# Patient Record
Sex: MALE | Race: WHITE | Employment: OTHER | URBAN - NONMETROPOLITAN AREA
[De-identification: names, ages, dates, MRNs, and addresses within clinical notes are randomized per-mention and may not be internally consistent; named-entity substitution may affect disease eponyms.]

---

## 2017-01-03 ENCOUNTER — OFFICE VISIT (OUTPATIENT)
Dept: FAMILY MEDICINE CLINIC | Age: 57
End: 2017-01-03

## 2017-01-03 ENCOUNTER — TELEPHONE (OUTPATIENT)
Dept: FAMILY MEDICINE CLINIC | Age: 57
End: 2017-01-03

## 2017-01-03 VITALS
OXYGEN SATURATION: 94 % | DIASTOLIC BLOOD PRESSURE: 86 MMHG | TEMPERATURE: 98.1 F | WEIGHT: 166 LBS | SYSTOLIC BLOOD PRESSURE: 118 MMHG | HEART RATE: 86 BPM | BODY MASS INDEX: 27.2 KG/M2

## 2017-01-03 DIAGNOSIS — R06.02 SHORTNESS OF BREATH: ICD-10-CM

## 2017-01-03 DIAGNOSIS — R05.9 COUGH: Primary | ICD-10-CM

## 2017-01-03 PROCEDURE — 99213 OFFICE O/P EST LOW 20 MIN: CPT | Performed by: FAMILY MEDICINE

## 2017-01-03 RX ORDER — DOXYCYCLINE HYCLATE 100 MG
100 TABLET ORAL 2 TIMES DAILY
Qty: 20 TABLET | Refills: 0 | Status: SHIPPED | OUTPATIENT
Start: 2017-01-03 | End: 2017-01-13

## 2017-01-03 RX ORDER — PROMETHAZINE HYDROCHLORIDE AND CODEINE PHOSPHATE 6.25; 1 MG/5ML; MG/5ML
SYRUP ORAL
Qty: 120 ML | Refills: 0 | Status: SHIPPED | OUTPATIENT
Start: 2017-01-03 | End: 2017-11-13

## 2017-01-03 ASSESSMENT — ENCOUNTER SYMPTOMS
SHORTNESS OF BREATH: 1
VOMITING: 0
SORE THROAT: 1
WHEEZING: 1
NAUSEA: 1
HEMOPTYSIS: 0
RHINORRHEA: 1
COUGH: 1

## 2017-07-03 RX ORDER — MELOXICAM 15 MG/1
TABLET ORAL
Qty: 90 TABLET | Refills: 1 | Status: SHIPPED | OUTPATIENT
Start: 2017-07-03 | End: 2018-03-18 | Stop reason: SDUPTHER

## 2017-11-13 ENCOUNTER — OFFICE VISIT (OUTPATIENT)
Dept: FAMILY MEDICINE CLINIC | Age: 57
End: 2017-11-13

## 2017-11-13 VITALS
HEIGHT: 67 IN | SYSTOLIC BLOOD PRESSURE: 148 MMHG | DIASTOLIC BLOOD PRESSURE: 100 MMHG | TEMPERATURE: 99.2 F | BODY MASS INDEX: 24.17 KG/M2 | HEART RATE: 104 BPM | OXYGEN SATURATION: 95 % | WEIGHT: 154 LBS

## 2017-11-13 DIAGNOSIS — R05.9 COUGH: ICD-10-CM

## 2017-11-13 DIAGNOSIS — J01.00 ACUTE MAXILLARY SINUSITIS, RECURRENCE NOT SPECIFIED: Primary | ICD-10-CM

## 2017-11-13 PROCEDURE — 99213 OFFICE O/P EST LOW 20 MIN: CPT | Performed by: FAMILY MEDICINE

## 2017-11-13 RX ORDER — PROMETHAZINE HYDROCHLORIDE AND CODEINE PHOSPHATE 6.25; 1 MG/5ML; MG/5ML
SYRUP ORAL
Qty: 120 ML | Refills: 0 | Status: SHIPPED | OUTPATIENT
Start: 2017-11-13 | End: 2018-10-02 | Stop reason: ALTCHOICE

## 2017-11-13 RX ORDER — DOXYCYCLINE HYCLATE 100 MG
100 TABLET ORAL 2 TIMES DAILY
Qty: 20 TABLET | Refills: 0 | Status: SHIPPED | OUTPATIENT
Start: 2017-11-13 | End: 2017-11-23

## 2017-11-13 ASSESSMENT — ENCOUNTER SYMPTOMS
SHORTNESS OF BREATH: 1
SORE THROAT: 1
SINUS PRESSURE: 1
COUGH: 1

## 2017-11-13 NOTE — LETTER
[address] [facility] [address]  Phone: [phone]  Fax: [phone]    Aminata Jacinto MD        November 13, 2017     Patient: Griselda Guy   YOB: 1960   Date of Visit: 11/13/2017       To Whom It May Concern: It is my medical opinion that Farhana Turner should remain out of work until 11/15/17. If you have any questions or concerns, please don't hesitate to call.     Sincerely,    Aminata Jacinto MD

## 2017-11-13 NOTE — PROGRESS NOTES
[address] [place] [address] Drive, [facility], [place]  Phone:  [phone]  Fax:  [phone]       Name: Ange Bingham  : 1960    Chief Complaint   Patient presents with    Chest Congestion     SOB, right ear pain, fevers, all started 2 weeks ago    Congestion    Headache    Head Congestion    Nasal Congestion       HPI:     Ange Bingham is a 59 y.o. male who presents today for evaluation of chest congestion, cough, SOB, frontal headache, and nasal congestion for the past 2 weeks. He continues to smoke but over the past 2 weeks has cut down to only 2 packs because of the increased coughing. Sinusitis   This is a new problem. Episode onset: 2 weeks ago. The problem has been gradually worsening since onset. The maximum temperature recorded prior to his arrival was 101 - 101.9 F. Associated symptoms include chills, congestion, coughing, ear pain, headaches, shortness of breath, sinus pressure and a sore throat. Past treatments include nothing. Current Outpatient Prescriptions:     doxycycline hyclate (VIBRA-TABS) 100 MG tablet, Take 1 tablet by mouth 2 times daily for 10 days, Disp: 20 tablet, Rfl: 0    promethazine-codeine (PHENERGAN WITH CODEINE) 6.25-10 MG/5ML syrup, 1-2 tsp Q 6 Hours PRN cough. , Disp: 120 mL, Rfl: 0    meloxicam (MOBIC) 15 MG tablet, take 1 tablet by mouth once daily, Disp: 90 tablet, Rfl: 1    ibuprofen (ADVIL;MOTRIN) 200 MG tablet, Take 200 mg by mouth every 6 hours as needed for Pain, Disp: , Rfl:     Allergies   Allergen Reactions    Sulfa Antibiotics Hives       Subjective:      Review of Systems   Constitutional: Positive for chills and fever. HENT: Positive for congestion, ear pain, sinus pressure and sore throat. Respiratory: Positive for cough and shortness of breath. Neurological: Positive for headaches.        Objective:     BP (!) 148/100 (Site: Left Arm, Position: Sitting, Cuff Size: Large Adult)   Pulse 104   Temp

## 2018-04-12 ENCOUNTER — HOSPITAL ENCOUNTER (EMERGENCY)
Age: 58
Discharge: HOME | End: 2018-04-12
Attending: FAMILY MEDICINE
Payer: COMMERCIAL

## 2018-04-12 ENCOUNTER — APPOINTMENT (OUTPATIENT)
Dept: GENERAL RADIOLOGY | Age: 58
End: 2018-04-12
Payer: COMMERCIAL

## 2018-04-12 VITALS
RESPIRATION RATE: 18 BRPM | WEIGHT: 154 LBS | HEART RATE: 74 BPM | OXYGEN SATURATION: 98 % | TEMPERATURE: 97.8 F | SYSTOLIC BLOOD PRESSURE: 150 MMHG | HEIGHT: 67 IN | BODY MASS INDEX: 24.17 KG/M2 | DIASTOLIC BLOOD PRESSURE: 98 MMHG

## 2018-04-12 DIAGNOSIS — I10 ESSENTIAL HYPERTENSION: Primary | ICD-10-CM

## 2018-04-12 LAB
ALBUMIN SERPL-MCNC: 4.3 G/DL (ref 3.5–5.1)
ALP BLD-CCNC: 66 U/L (ref 38–126)
ALT SERPL-CCNC: 27 U/L (ref 11–66)
ANION GAP SERPL CALCULATED.3IONS-SCNC: 13 MEQ/L (ref 8–16)
ANISOCYTOSIS: ABNORMAL
APTT: 32.1 SECONDS (ref 22–38)
AST SERPL-CCNC: 20 U/L (ref 5–40)
BASOPHILS # BLD: 0.7 %
BASOPHILS ABSOLUTE: 0 THOU/MM3 (ref 0–0.1)
BILIRUB SERPL-MCNC: 0.2 MG/DL (ref 0.3–1.2)
BILIRUBIN DIRECT: < 0.2 MG/DL (ref 0–0.3)
BUN BLDV-MCNC: 24 MG/DL (ref 7–22)
CALCIUM SERPL-MCNC: 9.2 MG/DL (ref 8.5–10.5)
CHLORIDE BLD-SCNC: 102 MEQ/L (ref 98–111)
CO2: 26 MEQ/L (ref 23–33)
CREAT SERPL-MCNC: 0.8 MG/DL (ref 0.4–1.2)
EKG ATRIAL RATE: 64 BPM
EKG P AXIS: 29 DEGREES
EKG P-R INTERVAL: 196 MS
EKG Q-T INTERVAL: 394 MS
EKG QRS DURATION: 102 MS
EKG QTC CALCULATION (BAZETT): 406 MS
EKG R AXIS: 64 DEGREES
EKG T AXIS: 44 DEGREES
EKG VENTRICULAR RATE: 64 BPM
EOSINOPHIL # BLD: 1.9 %
EOSINOPHILS ABSOLUTE: 0.1 THOU/MM3 (ref 0–0.4)
GFR SERPL CREATININE-BSD FRML MDRD: > 90 ML/MIN/1.73M2
GLUCOSE BLD-MCNC: 100 MG/DL (ref 70–108)
HCT VFR BLD CALC: 46.2 % (ref 42–52)
HEMOGLOBIN: 15.5 GM/DL (ref 14–18)
INR BLD: 1.01 (ref 0.85–1.13)
LIPASE: 34 U/L (ref 5.6–51.3)
LYMPHOCYTES # BLD: 23.9 %
LYMPHOCYTES ABSOLUTE: 1.4 THOU/MM3 (ref 1–4.8)
MCH RBC QN AUTO: 29 PG (ref 27–31)
MCHC RBC AUTO-ENTMCNC: 33.6 GM/DL (ref 33–37)
MCV RBC AUTO: 86.4 FL (ref 80–94)
MONOCYTES # BLD: 9 %
MONOCYTES ABSOLUTE: 0.5 THOU/MM3 (ref 0.4–1.3)
NUCLEATED RED BLOOD CELLS: 0 /100 WBC
OSMOLALITY CALCULATION: 285.4 MOSMOL/KG (ref 275–300)
PDW BLD-RTO: 14.9 % (ref 11.5–14.5)
PLATELET # BLD: 215 THOU/MM3 (ref 130–400)
PMV BLD AUTO: 8.6 FL (ref 7.4–10.4)
POTASSIUM SERPL-SCNC: 5 MEQ/L (ref 3.5–5.2)
RBC # BLD: 5.35 MILL/MM3 (ref 4.7–6.1)
SEG NEUTROPHILS: 64.5 %
SEGMENTED NEUTROPHILS ABSOLUTE COUNT: 3.7 THOU/MM3 (ref 1.8–7.7)
SODIUM BLD-SCNC: 141 MEQ/L (ref 135–145)
TOTAL PROTEIN: 7.9 G/DL (ref 6.1–8)
TROPONIN T: < 0.01 NG/ML
WBC # BLD: 5.8 THOU/MM3 (ref 4.8–10.8)

## 2018-04-12 PROCEDURE — 99285 EMERGENCY DEPT VISIT HI MDM: CPT

## 2018-04-12 PROCEDURE — 80053 COMPREHEN METABOLIC PANEL: CPT

## 2018-04-12 PROCEDURE — 82248 BILIRUBIN DIRECT: CPT

## 2018-04-12 PROCEDURE — 85025 COMPLETE CBC W/AUTO DIFF WBC: CPT

## 2018-04-12 PROCEDURE — 71045 X-RAY EXAM CHEST 1 VIEW: CPT

## 2018-04-12 PROCEDURE — 93005 ELECTROCARDIOGRAM TRACING: CPT | Performed by: FAMILY MEDICINE

## 2018-04-12 PROCEDURE — 85730 THROMBOPLASTIN TIME PARTIAL: CPT

## 2018-04-12 PROCEDURE — 84484 ASSAY OF TROPONIN QUANT: CPT

## 2018-04-12 PROCEDURE — 36415 COLL VENOUS BLD VENIPUNCTURE: CPT

## 2018-04-12 PROCEDURE — 83690 ASSAY OF LIPASE: CPT

## 2018-04-12 PROCEDURE — 85610 PROTHROMBIN TIME: CPT

## 2018-04-12 RX ORDER — LISINOPRIL AND HYDROCHLOROTHIAZIDE 12.5; 1 MG/1; MG/1
1 TABLET ORAL DAILY
Qty: 30 TABLET | Refills: 3 | Status: SHIPPED | OUTPATIENT
Start: 2018-04-12 | End: 2018-12-17 | Stop reason: SDUPTHER

## 2018-04-12 RX ORDER — LISINOPRIL 10 MG/1
10 TABLET ORAL ONCE
Status: DISCONTINUED | OUTPATIENT
Start: 2018-04-12 | End: 2018-04-12 | Stop reason: HOSPADM

## 2018-04-12 RX ORDER — HYDROCHLOROTHIAZIDE 25 MG/1
12.5 TABLET ORAL ONCE
Status: DISCONTINUED | OUTPATIENT
Start: 2018-04-12 | End: 2018-04-12 | Stop reason: HOSPADM

## 2018-04-12 ASSESSMENT — HEART SCORE: ECG: 0

## 2018-04-12 ASSESSMENT — ENCOUNTER SYMPTOMS
EYE DISCHARGE: 0
CHEST TIGHTNESS: 1
ABDOMINAL PAIN: 0

## 2018-07-03 RX ORDER — MELOXICAM 15 MG/1
TABLET ORAL
Qty: 90 TABLET | Refills: 0 | Status: SHIPPED | OUTPATIENT
Start: 2018-07-03 | End: 2018-10-02 | Stop reason: SDUPTHER

## 2018-10-02 ENCOUNTER — HOSPITAL ENCOUNTER (EMERGENCY)
Age: 58
Discharge: HOME | End: 2018-10-02
Payer: COMMERCIAL

## 2018-10-02 VITALS
DIASTOLIC BLOOD PRESSURE: 79 MMHG | HEIGHT: 65 IN | TEMPERATURE: 97.6 F | BODY MASS INDEX: 25.83 KG/M2 | HEART RATE: 66 BPM | OXYGEN SATURATION: 97 % | WEIGHT: 155 LBS | RESPIRATION RATE: 14 BRPM | SYSTOLIC BLOOD PRESSURE: 124 MMHG

## 2018-10-02 DIAGNOSIS — G56.03 BILATERAL CARPAL TUNNEL SYNDROME: Primary | ICD-10-CM

## 2018-10-02 PROCEDURE — 99213 OFFICE O/P EST LOW 20 MIN: CPT | Performed by: NURSE PRACTITIONER

## 2018-10-02 PROCEDURE — L3809 WHFO W/O JOINTS PRE OTS: HCPCS

## 2018-10-02 PROCEDURE — 99212 OFFICE O/P EST SF 10 MIN: CPT

## 2018-10-02 RX ORDER — PREDNISONE 10 MG/1
TABLET ORAL
Qty: 21 TABLET | Refills: 0 | Status: SHIPPED | OUTPATIENT
Start: 2018-10-02 | End: 2019-01-04 | Stop reason: ALTCHOICE

## 2018-10-02 ASSESSMENT — PAIN DESCRIPTION - DESCRIPTORS: DESCRIPTORS: SHARP

## 2018-10-02 ASSESSMENT — ENCOUNTER SYMPTOMS
ABDOMINAL PAIN: 0
BACK PAIN: 0
SHORTNESS OF BREATH: 0
RESPIRATORY NEGATIVE: 1

## 2018-10-02 ASSESSMENT — PAIN DESCRIPTION - PAIN TYPE: TYPE: ACUTE PAIN

## 2018-10-02 ASSESSMENT — PAIN DESCRIPTION - ORIENTATION: ORIENTATION: RIGHT;LEFT

## 2018-10-02 ASSESSMENT — PAIN SCALES - GENERAL: PAINLEVEL_OUTOF10: 8

## 2018-10-02 ASSESSMENT — PAIN DESCRIPTION - LOCATION: LOCATION: WRIST

## 2018-10-02 NOTE — ED PROVIDER NOTES
[address]  Urgent Care Encounter       CHIEF COMPLAINT       Chief Complaint   Patient presents with    Wrist Pain     bilateral wrist pain worsened on sunday, however it has been painful x 1 year. Nurses Notes reviewed and I agree except as noted in the HPI. HISTORY OF PRESENT ILLNESS   Shelley Zavala is a 59 y.o. male who presents To the urgent care center complaining of bilateral wrist pain. The patient states he has had pain over the past year however the pain is progressively gotten worse since Sunday. he didn't has been wearing splints to both wrist intermittently during that timeframe. The patient does work as a  and apparently moving heavy pieces of metal repetitively. The patient states that this morning he had pain to the wrist and had a hard time making a fist.  Complained of pain more to the left hand than the right. he is left-hand dominant. The patient stated that he has been taking ibuprofen for the pain. He also complained of tingling to the fingers. The patient also complained of pain radiating up the left forearm. The history is provided by the patient. No  was used. Wrist Pain   This is a chronic problem. The current episode started more than 1 week ago (one year ago). The problem occurs constantly. The problem has been gradually worsening. Pertinent negatives include no chest pain, no abdominal pain, no headaches and no shortness of breath. The symptoms are aggravated by bending. Nothing relieves the symptoms. He has tried a cold compress and rest for the symptoms. The treatment provided no relief. REVIEW OF SYSTEMS     Review of Systems   Constitutional: Positive for activity change. Negative for appetite change and fever. Respiratory: Negative. Negative for shortness of breath. Cardiovascular: Negative for chest pain. Gastrointestinal: Negative for abdominal pain. Musculoskeletal: Positive for arthralgias. Negative for back pain, joint swelling, neck pain and neck stiffness. Skin: Negative. Neurological: Positive for weakness and numbness. Negative for tremors and headaches. Bilateral wrist       PAST MEDICAL HISTORY         Diagnosis Date    Chronic back pain     Essential hypertension 4/1/2016    GERD (gastroesophageal reflux disease)     Osteoarthritis     Stomach ulcer        SURGICAL HISTORY     Patient  has a past surgical history that includes Rotator cuff repair (2005 x2); hernia repair; hernia repair (1/3/12); Vasectomy (2005); and Tooth Extraction (1997). CURRENT MEDICATIONS       Discharge Medication List as of 10/2/2018 10:30 AM      CONTINUE these medications which have NOT CHANGED    Details   Potassium 75 MG TABS Take by mouthHistorical Med      meloxicam (MOBIC) 15 MG tablet take 1 tablet by mouth once daily, Disp-90 tablet, R-0Normal      lisinopril-hydrochlorothiazide (PRINZIDE;ZESTORETIC) 10-12.5 MG per tablet Take 1 tablet by mouth daily, Disp-30 tablet, R-3Print      ibuprofen (ADVIL;MOTRIN) 200 MG tablet Take 600 mg by mouth every 6 hours as needed for Pain Historical Med             ALLERGIES     Patient is is allergic to sulfa antibiotics. Patients   Immunization History   Administered Date(s) Administered    Influenza Virus Vaccine 03/15/2014    Pneumococcal Polysaccharide ([id number]) 03/15/2014       FAMILY HISTORY     Patient's family history includes Arthritis in his brother, father, and mother; Cancer in his brother and father; Diabetes in his father; Heart Disease in his brother, father, mother, and sister; High Blood Pressure in his brother, father, mother, and sister; Learning Disabilities in his brother and sister. SOCIAL HISTORY     Patient  reports that he has been smoking Cigarettes. He has a 40.00 pack-year smoking history. He has never used smokeless tobacco. He reports that he does not drink alcohol or use drugs.     PHYSICAL EXAM     ED TRIAGE

## 2018-10-03 RX ORDER — MELOXICAM 15 MG/1
TABLET ORAL
Qty: 90 TABLET | Refills: 0 | Status: SHIPPED | OUTPATIENT
Start: 2018-10-03 | End: 2019-01-04 | Stop reason: ALTCHOICE

## 2018-11-15 ENCOUNTER — PROCEDURE VISIT (OUTPATIENT)
Dept: NEUROLOGY | Age: 58
End: 2018-11-15
Payer: COMMERCIAL

## 2018-11-15 DIAGNOSIS — R20.0 BILATERAL HAND NUMBNESS: ICD-10-CM

## 2018-11-15 DIAGNOSIS — G56.03 BILATERAL CARPAL TUNNEL SYNDROME: ICD-10-CM

## 2018-11-15 DIAGNOSIS — M54.12 CERVICAL RADICULOPATHY: Primary | ICD-10-CM

## 2018-11-15 PROCEDURE — 95911 NRV CNDJ TEST 9-10 STUDIES: CPT | Performed by: PSYCHIATRY & NEUROLOGY

## 2018-11-15 PROCEDURE — 95886 MUSC TEST DONE W/N TEST COMP: CPT | Performed by: PSYCHIATRY & NEUROLOGY

## 2018-11-29 ENCOUNTER — HOSPITAL ENCOUNTER (OUTPATIENT)
Age: 58
Discharge: HOME | End: 2018-11-29
Payer: COMMERCIAL

## 2018-11-29 ENCOUNTER — HOSPITAL ENCOUNTER (OUTPATIENT)
Dept: GENERAL RADIOLOGY | Age: 58
Discharge: HOME | End: 2018-11-29
Payer: COMMERCIAL

## 2018-11-29 DIAGNOSIS — Z01.811 PRE-OP CHEST EXAM: ICD-10-CM

## 2018-11-29 LAB
ANION GAP SERPL CALCULATED.3IONS-SCNC: 13 MEQ/L (ref 8–16)
BASOPHILS # BLD: 0.6 %
BASOPHILS ABSOLUTE: 0 THOU/MM3 (ref 0–0.1)
BUN BLDV-MCNC: 25 MG/DL (ref 7–22)
CALCIUM SERPL-MCNC: 9.6 MG/DL (ref 8.5–10.5)
CHLORIDE BLD-SCNC: 103 MEQ/L (ref 98–111)
CO2: 25 MEQ/L (ref 23–33)
CREAT SERPL-MCNC: 1.1 MG/DL (ref 0.4–1.2)
EKG ATRIAL RATE: 72 BPM
EKG P AXIS: 47 DEGREES
EKG P-R INTERVAL: 202 MS
EKG Q-T INTERVAL: 396 MS
EKG QRS DURATION: 102 MS
EKG QTC CALCULATION (BAZETT): 433 MS
EKG R AXIS: 57 DEGREES
EKG T AXIS: 47 DEGREES
EKG VENTRICULAR RATE: 72 BPM
EOSINOPHIL # BLD: 4 %
EOSINOPHILS ABSOLUTE: 0.3 THOU/MM3 (ref 0–0.4)
ERYTHROCYTE [DISTWIDTH] IN BLOOD BY AUTOMATED COUNT: 14.2 % (ref 11.5–14.5)
ERYTHROCYTE [DISTWIDTH] IN BLOOD BY AUTOMATED COUNT: 46 FL (ref 35–45)
GFR SERPL CREATININE-BSD FRML MDRD: 68 ML/MIN/1.73M2
GLUCOSE BLD-MCNC: 89 MG/DL (ref 70–108)
HCT VFR BLD CALC: 41.9 % (ref 42–52)
HEMOGLOBIN: 14 GM/DL (ref 14–18)
IMMATURE GRANS (ABS): 0.02 THOU/MM3 (ref 0–0.07)
IMMATURE GRANULOCYTES: 0.3 %
LYMPHOCYTES # BLD: 37.7 %
LYMPHOCYTES ABSOLUTE: 2.5 THOU/MM3 (ref 1–4.8)
MCH RBC QN AUTO: 29.4 PG (ref 26–33)
MCHC RBC AUTO-ENTMCNC: 33.4 GM/DL (ref 32.2–35.5)
MCV RBC AUTO: 88 FL (ref 80–94)
MONOCYTES # BLD: 8.7 %
MONOCYTES ABSOLUTE: 0.6 THOU/MM3 (ref 0.4–1.3)
MRSA SCREEN RT-PCR: NEGATIVE
NUCLEATED RED BLOOD CELLS: 0 /100 WBC
PLATELET # BLD: 289 THOU/MM3 (ref 130–400)
PMV BLD AUTO: 10.4 FL (ref 9.4–12.4)
POTASSIUM SERPL-SCNC: 4 MEQ/L (ref 3.5–5.2)
RBC # BLD: 4.76 MILL/MM3 (ref 4.7–6.1)
SEG NEUTROPHILS: 48.7 %
SEGMENTED NEUTROPHILS ABSOLUTE COUNT: 3.3 THOU/MM3 (ref 1.8–7.7)
SODIUM BLD-SCNC: 141 MEQ/L (ref 135–145)
WBC # BLD: 6.7 THOU/MM3 (ref 4.8–10.8)

## 2018-11-29 PROCEDURE — 80048 BASIC METABOLIC PNL TOTAL CA: CPT

## 2018-11-29 PROCEDURE — 87641 MR-STAPH DNA AMP PROBE: CPT

## 2018-11-29 PROCEDURE — 71046 X-RAY EXAM CHEST 2 VIEWS: CPT

## 2018-11-29 PROCEDURE — 36415 COLL VENOUS BLD VENIPUNCTURE: CPT

## 2018-11-29 PROCEDURE — 99211 OFF/OP EST MAY X REQ PHY/QHP: CPT

## 2018-11-29 PROCEDURE — 85025 COMPLETE CBC W/AUTO DIFF WBC: CPT

## 2018-11-29 PROCEDURE — 93005 ELECTROCARDIOGRAM TRACING: CPT | Performed by: ORTHOPAEDIC SURGERY

## 2018-11-30 PROCEDURE — 93010 ELECTROCARDIOGRAM REPORT: CPT | Performed by: INTERNAL MEDICINE

## 2018-12-17 RX ORDER — LISINOPRIL AND HYDROCHLOROTHIAZIDE 12.5; 1 MG/1; MG/1
1 TABLET ORAL DAILY
Qty: 30 TABLET | Refills: 0 | Status: SHIPPED | OUTPATIENT
Start: 2018-12-17 | End: 2019-01-04 | Stop reason: DRUGHIGH

## 2018-12-17 NOTE — TELEPHONE ENCOUNTER
Linnea Issa called requesting a refill on the following medications:  Requested Prescriptions     Pending Prescriptions Disp Refills    lisinopril-hydrochlorothiazide (PRINZIDE;ZESTORETIC) 10-12.5 MG per tablet 30 tablet 0     Sig: Take 1 tablet by mouth daily       Date of last visit: Visit date not found  Date of next visit (if applicable):1/4/2019  Pharmacy Name: [address]    Patient overdue for check up. Patient states he has been out of medication x 5 days. Unable to come in soon as patient is going out of town for a week, leaving today. 30 day supply only.       Thanks,  Calvin Encarnacion, MONSERRAT ([facility])

## 2019-01-02 ENCOUNTER — HOSPITAL ENCOUNTER (EMERGENCY)
Age: 59
Discharge: HOME | End: 2019-01-02
Payer: COMMERCIAL

## 2019-01-02 VITALS
TEMPERATURE: 97.5 F | OXYGEN SATURATION: 97 % | DIASTOLIC BLOOD PRESSURE: 85 MMHG | HEART RATE: 88 BPM | RESPIRATION RATE: 14 BRPM | SYSTOLIC BLOOD PRESSURE: 136 MMHG

## 2019-01-02 DIAGNOSIS — G89.18 PAIN FOLLOWING SURGERY OR PROCEDURE: Primary | ICD-10-CM

## 2019-01-02 PROCEDURE — 2709999900 HC NON-CHARGEABLE SUPPLY

## 2019-01-02 PROCEDURE — 99213 OFFICE O/P EST LOW 20 MIN: CPT | Performed by: NURSE PRACTITIONER

## 2019-01-02 PROCEDURE — 99212 OFFICE O/P EST SF 10 MIN: CPT

## 2019-01-02 RX ORDER — OXYCODONE HYDROCHLORIDE AND ACETAMINOPHEN 5; 325 MG/1; MG/1
1 TABLET ORAL EVERY 4 HOURS PRN
COMMUNITY
End: 2019-01-04

## 2019-01-02 ASSESSMENT — PAIN DESCRIPTION - PAIN TYPE: TYPE: ACUTE PAIN;SURGICAL PAIN

## 2019-01-02 ASSESSMENT — PAIN DESCRIPTION - DESCRIPTORS: DESCRIPTORS: ACHING

## 2019-01-02 ASSESSMENT — PAIN DESCRIPTION - LOCATION: LOCATION: WRIST;ARM;ELBOW

## 2019-01-02 ASSESSMENT — PAIN DESCRIPTION - ORIENTATION: ORIENTATION: LEFT;LOWER

## 2019-01-02 ASSESSMENT — ENCOUNTER SYMPTOMS
VOMITING: 0
NAUSEA: 0

## 2019-01-02 ASSESSMENT — PAIN SCALES - GENERAL: PAINLEVEL_OUTOF10: 2

## 2019-01-04 ENCOUNTER — OFFICE VISIT (OUTPATIENT)
Dept: FAMILY MEDICINE CLINIC | Age: 59
End: 2019-01-04
Payer: COMMERCIAL

## 2019-01-04 VITALS
BODY MASS INDEX: 24.91 KG/M2 | HEIGHT: 66 IN | WEIGHT: 155 LBS | SYSTOLIC BLOOD PRESSURE: 136 MMHG | HEART RATE: 76 BPM | DIASTOLIC BLOOD PRESSURE: 78 MMHG

## 2019-01-04 DIAGNOSIS — I10 ESSENTIAL HYPERTENSION: Primary | Chronic | ICD-10-CM

## 2019-01-04 DIAGNOSIS — Z12.11 SPECIAL SCREENING FOR MALIGNANT NEOPLASMS, COLON: ICD-10-CM

## 2019-01-04 PROCEDURE — 99213 OFFICE O/P EST LOW 20 MIN: CPT | Performed by: FAMILY MEDICINE

## 2019-01-04 RX ORDER — LISINOPRIL 5 MG/1
5 TABLET ORAL DAILY
Qty: 90 TABLET | Refills: 1 | Status: SHIPPED | OUTPATIENT
Start: 2019-01-04 | End: 2019-07-17 | Stop reason: SDUPTHER

## 2019-01-04 ASSESSMENT — PATIENT HEALTH QUESTIONNAIRE - PHQ9
SUM OF ALL RESPONSES TO PHQ QUESTIONS 1-9: 0
2. FEELING DOWN, DEPRESSED OR HOPELESS: 0
SUM OF ALL RESPONSES TO PHQ9 QUESTIONS 1 & 2: 0
SUM OF ALL RESPONSES TO PHQ QUESTIONS 1-9: 0
1. LITTLE INTEREST OR PLEASURE IN DOING THINGS: 0

## 2019-01-05 ASSESSMENT — ENCOUNTER SYMPTOMS
ORTHOPNEA: 0
RESPIRATORY NEGATIVE: 1
GASTROINTESTINAL NEGATIVE: 1

## 2019-01-15 RX ORDER — MELOXICAM 15 MG/1
TABLET ORAL
Qty: 90 TABLET | Refills: 0 | Status: SHIPPED | OUTPATIENT
Start: 2019-01-15 | End: 2019-05-03 | Stop reason: SDUPTHER

## 2019-04-24 ENCOUNTER — HOSPITAL ENCOUNTER (OUTPATIENT)
Dept: OCCUPATIONAL THERAPY | Age: 59
Setting detail: THERAPIES SERIES
Discharge: HOME | End: 2019-04-24
Payer: COMMERCIAL

## 2019-04-24 PROCEDURE — 97110 THERAPEUTIC EXERCISES: CPT

## 2019-04-24 PROCEDURE — 97165 OT EVAL LOW COMPLEX 30 MIN: CPT

## 2019-04-24 ASSESSMENT — PAIN SCALES - GENERAL: PAINLEVEL_OUTOF10: 3

## 2019-04-24 ASSESSMENT — PAIN DESCRIPTION - LOCATION: LOCATION: WRIST

## 2019-04-24 ASSESSMENT — PAIN DESCRIPTION - ORIENTATION: ORIENTATION: LEFT

## 2019-04-24 NOTE — FLOWSHEET NOTE
** PLEASE SIGN, DATE AND TIME CERTIFICATION BELOW AND RETURN TO [facility] OUTPATIENT REHABILITATION (FAX #: [phone]). ATTEST/CO-SIGN IF ACCESSING VIA [facility]. THANK YOU.**    I certify that I have examined the patient below and determined that Physical Medicine and Rehabilitation service is necessary and that I approve the established plan of care for up to 90 days or as specifically noted. Attestation, signature or co-signature of physician indicates approval of certification requirements.    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 Patient had surgery on 18 at [facility]. patient had hemiarthroplasty left distal ulna, proximal row capectomy, left wrist, decompression ulnar nerve at left elbow with anterior inra-muscular transposition, and left CTR. Patient had PT for left wrist at another facility earlier in the year. Returned to physician last week and was told that he needed to have OT not PT. Patient then presented to this clinic for OT evaluation. Patient states that in his previous therapy sessions they were working on \"flexibility and a little bit on strength and . \" States that the physician was not pleased with the amount of motion that he had in his left wrist. Reviewed allergies and medications - correct in EMR except patient no longer taking nexium and takes percoset occasionally. Comorbidities include HTN that could influence rehab process. Colette Montana  has a past medical history of Chronic back pain, Essential hypertension (4/1/2016), GERD (gastroesophageal reflux disease), Osteoarthritis, and Stomach ulcer. Colette Montana  has a past surgical history that includes Rotator cuff repair (2005 x2); hernia repair; hernia repair (1/3/12); Vasectomy (2005); Tooth Extraction (1997); Colonoscopy (2019); and EGD (2019). See Medical History Questionnaire for information related to allergies and medications. General:  OT Visit Information  Onset Date: 04/24/19  OT Insurance Information: Nghia Gamarcus - no massage  Total # of Visits Approved: 25  Total # of Visits to Date: 1  Certification Period Expiration Date: 06/05/19  Progress Note Counter: 1/10 for PN  Comments: returns to physician on 5/28  Chart Reviewed: Yes    Restrictions/Precautions:       Position Activity Restriction  Other position/activity restrictions: HTN, no lifting more than 10#; left wrist surgery on 12/27/18         Subjective:  Subjective: Patient relates that he thinks that he should have more movement in his left wrist than he currently has.            Pain:  Pain Assessment  Patient Currently in Pain: Yes  Pain Assessment: 0-10  Pain Level: 3(3-4/10 at time for evaluation; goes up to 7-8/10 at its highest)  Pain Location: Wrist  Pain Orientation: Left    Social/Functional History:    Lives With: Spouse(has custody of 5 grandchildren)             ADL Assistance: Independent  Homemaking Assistance: Independent  Homemaking Responsibilities: Yes  Ambulation Assistance: Independent  Transfer Assistance: Independent    Active : Yes  Occupation: Full time employment  Type of occupation:  at [employer] - currently on short term disability (6 month deadline will be the end of June)  Leisure & Hobbies: riding motorcycle, fishing , camping    Objective  Vision: Within Functional Limits  Hearing: Patient meets criteria for low complexity evaluation based on documented evaluation findings. Patient demonstrates signficant tightness in left wrist, forearm, and hand. Intrinsic tightness is present in left hand with probable decreased tendon gliding/excursion in left wrist. Possible splinting needs are present to increase wrist/finger ROM - will continue to assess. Prognosis: Good  Clinical Decision Making: Clinical Decision making was of Low Complexity as the result of analysis of data from a problem focused assessment, a consideration of a limited number of treatment options, no significant comorbidities affecting the plan of care and no modification or assistance required to complete the evaluation. Patient Education:  Patient Education: OT POC, use of fluidotherapy/paraffin; HEP - prayer stretch for wrist extension, passive left wrist flexion/extension, hook fisting, straight fisting, complete fisting, passive left supination  Barriers to Learning: none          Treatment Initiated : Completed 15 reps of following exercises: prayer stretch for wrist extension, passive left wrist flexion/extension, hook fisting, straight fisting, complete fisting, and passive left supination. PROM completed for left wrist flexion/extension and digit flexion. Plan:  Times per week: 2 x week  Plan weeks: 6 weeks  Current Treatment Recommendations: Strengthening, ROM, Self-Care / ADL, Patient/Caregiver Education & Training, Pain Management, Modalities (comment), Other (comment)(fluidotherapy, paraffin, splinting)  Plan Comment: POC established and discussed with patient  Specific instructions for Next Treatment: paraffin with fingers cobaned into flexion, PROM left wrist, forearm, and hand    Goals:  Patient goals : To be able to ride my motorcycle. To be able to get back to work.     Short term goals  Time Frame for Short term goals: 4 weeks  Short term goal 1: Be independent with HEP as instructed to increase his ability to use his left hand for writing and eating. Short term goal 2: Increase active left supination to 65, wrist flexion to 55, and wrist extension to 50 to increase his ability to move clutch on motorcycle. Short term goal 3: Increase LALA of L1 to 80, L2 to 230, L3 to 245, L4 to 240, and L5 to 240 to increase his ability to hold onto eating utensils. Short term goal 4: Report pain going no higher than 3/10 in left wrist/hand at any time to increase his ability to perform household tasks. Long term goals  Time Frame for Long term goals : 6 weeks  Long term goal 1: Increase left  strength to at least 30# to increase his ability to perform work tasks. Long term goal 2: Be able to button pants and peform toileting tasks with left hand without difficulty. Long term goal 3: Be able to feed self using left hand without difficulty. Long term goal 4: Be able to open food and drink containers using both hands without difficulty. UEFS Score: 45  UEFS Disability Index: 40-59%  UEFS CMS Modifier: CK%    Evaluation Complexity: Based on the findings of patient history, examination, clinical presentation, and decision making during this evaluation, this patient is of low complexity.     Awilda Bernard

## 2019-04-26 ENCOUNTER — HOSPITAL ENCOUNTER (OUTPATIENT)
Dept: OCCUPATIONAL THERAPY | Age: 59
Setting detail: THERAPIES SERIES
Discharge: HOME | End: 2019-04-26
Payer: COMMERCIAL

## 2019-04-26 PROCEDURE — 97110 THERAPEUTIC EXERCISES: CPT

## 2019-04-26 PROCEDURE — 97018 PARAFFIN BATH THERAPY: CPT

## 2019-04-26 ASSESSMENT — PAIN SCALES - GENERAL: PAINLEVEL_OUTOF10: 3

## 2019-04-26 ASSESSMENT — PAIN DESCRIPTION - ORIENTATION: ORIENTATION: LEFT

## 2019-04-26 ASSESSMENT — PAIN DESCRIPTION - LOCATION: LOCATION: WRIST

## 2019-04-26 NOTE — PROGRESS NOTES
[facility]  OUTPATIENT OCCUPATIONAL THERAPY  Daily Note  [address] [facility].    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          General:  OT Visit Information  Onset Date: 19  OT Insurance Information: Mika Munguia - no massage  Total # of Visits Approved: 25  Total # of Visits to Date: 2  Certification Period Expiration Date: 19  Progress Note Counter: 2/10 for PN  Comments: returns to physician on        Restrictions/Precautions:       Position Activity Restriction  Other position/activity restrictions: HTN, no lifting more than 10#; left wrist surgery on 18         Subjective:  Subjective: Patient reports he did have some soreness following evaluation, home exercises are going well with no questions regarding technique. Pain:  Patient Currently in Pain: Yes  Pain Assessment: 0-10  Pain Level: 3  Pain Location: Wrist  Pain Orientation: Left       Objective:     Upper Extremity Function  UE AROM: Active motions L wrist over towel roll flexion, extension, UD/RD x10 reps each, composite fist, hook fist, tendon glides x10 reps each. UE PROM: PROM to L digits individual and composite joints to tolerance, left wrist all motions with digits straight and flexed to tolerance. IASTM to L dorsal wrist and up to elbow to decrease tightness and edema. UE Stretching: Prayer stretch 10 second hold x5 reps. Paraffin  Number Minutes Paraffin: x15 minutes to L hand and wrist with digits taped into felxion with coban, MHP over. Paraffin Location: Left, Hand          Activity Tolerance: Additional Comments: Patient tolerated treatment well.  Did report some increased pain following session. Assessment:  Assessment: Patient is progressing towards goals. Patient Education:  Patient Education: Reviewed next scheduled appointment and encouraged continued HEP. Plan:  Plan Comment: Continue per established POC.                     CORONA Briceño CHARLES/L #[id number]

## 2019-05-01 ENCOUNTER — HOSPITAL ENCOUNTER (OUTPATIENT)
Dept: OCCUPATIONAL THERAPY | Age: 59
Setting detail: THERAPIES SERIES
Discharge: HOME | End: 2019-05-01
Payer: COMMERCIAL

## 2019-05-01 PROCEDURE — 97018 PARAFFIN BATH THERAPY: CPT

## 2019-05-01 PROCEDURE — 97110 THERAPEUTIC EXERCISES: CPT

## 2019-05-01 ASSESSMENT — PAIN SCALES - GENERAL: PAINLEVEL_OUTOF10: 3

## 2019-05-01 ASSESSMENT — PAIN DESCRIPTION - ORIENTATION: ORIENTATION: LEFT

## 2019-05-01 ASSESSMENT — PAIN DESCRIPTION - LOCATION: LOCATION: WRIST

## 2019-05-01 NOTE — PROGRESS NOTES
[facility]  OUTPATIENT OCCUPATIONAL THERAPY  Daily Note  [address] [facility].    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          General:  OT Visit Information  Onset Date: 19  OT Insurance Information: Essie Gelineau - no massage  Total # of Visits Approved: 25  Total # of Visits to Date: 3  Certification Period Expiration Date: 19  Progress Note Counter: 3/10 for PN  Comments: returns to physician on        Restrictions/Precautions:       Position Activity Restriction  Other position/activity restrictions: HTN, no lifting more than 10#; left wrist surgery on 18         Subjective:  Subjective: Patient reports he was sore the remainder of the evening following last treatment session, pain decreased the next morning. Relates he is completing HEP 3-4x a day. Pain:  Patient Currently in Pain: Yes  Pain Assessment: 0-10  Pain Level: 3  Pain Location: Wrist  Pain Orientation: Left       Objective:     Upper Extremity Function  UE AROM: Wrist PRE's over towel roll no weight x10 reps each, tendon glides x10 reps. UE PROM: PROM to L digits individual and composite joints with composite fisting to tolerance, L wrist all motions with digits straight and flexed, forearm pronation/supination to tolerance. IASTM and manual massage to L dorsal wrist and up forearm to decrease tightness - crepitus noted with wrist radial deviation. UE Stretching: Prayer stretch 10 second hold x5 reps, self supination stretch with elbow at side x10.                               Additional Activities Comment  Additional Activities: Size C tubigrip over left wrist and up forearm for compression to decrease swelling over dorsal wrist.        Paraffin  Number Minutes Paraffin: x15 minutes to L hand and wrist with digits taped into felxion with coban, MHP over. Paraffin Location: Left, Hand          Activity Tolerance: Additional Comments: Patient tolerated treatment well. Assessment:  Assessment: Patient is progressing towards goals. Patient Education:  Patient Education: Reviewed next scheduled appointment, purpose of compression sleeve. Plan:  Plan Comment: Continue per established POC.                     CORONA Briceño CHARLES/L #[id number]

## 2019-05-03 ENCOUNTER — HOSPITAL ENCOUNTER (OUTPATIENT)
Dept: OCCUPATIONAL THERAPY | Age: 59
Setting detail: THERAPIES SERIES
End: 2019-05-03
Payer: COMMERCIAL

## 2019-05-06 ENCOUNTER — HOSPITAL ENCOUNTER (OUTPATIENT)
Dept: OCCUPATIONAL THERAPY | Age: 59
Setting detail: THERAPIES SERIES
Discharge: HOME | End: 2019-05-06
Payer: COMMERCIAL

## 2019-05-06 PROCEDURE — 97110 THERAPEUTIC EXERCISES: CPT

## 2019-05-06 PROCEDURE — 97018 PARAFFIN BATH THERAPY: CPT

## 2019-05-06 RX ORDER — MELOXICAM 15 MG/1
TABLET ORAL
Qty: 90 TABLET | Refills: 0 | Status: SHIPPED | OUTPATIENT
Start: 2019-05-06 | End: 2019-08-22 | Stop reason: SDUPTHER

## 2019-05-06 ASSESSMENT — PAIN DESCRIPTION - LOCATION: LOCATION: HAND;WRIST

## 2019-05-06 ASSESSMENT — PAIN SCALES - GENERAL: PAINLEVEL_OUTOF10: 3

## 2019-05-06 ASSESSMENT — PAIN DESCRIPTION - ORIENTATION: ORIENTATION: LEFT

## 2019-05-06 NOTE — PROGRESS NOTES
[address] [address]  OUTPATIENT OCCUPATIONAL THERAPY  Daily Note  [address] [facility].    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           Pain:  Patient Currently in Pain: Yes  Pain Assessment: 0-10  Pain Level: 3  Pain Location: Hand, Wrist  Pain Orientation: Left       Objective:     Upper Extremity Function  UE AROM: Wrist PRE's over towel roll no weight x10 reps each, tendon glides x10 reps, thumb opposition digits 2-5 x5 reps, wrist maze x5 reps. UE PROM: PROM to L digits individual and composite joints with composite fisting to tolerance, L wrist all motions with digits straight and flexed, forearm pronation/supination to tolerance. IASTM and manual massage to L dorsal wrist and up forearm to decrease tightness. Paraffin  Number Minutes Paraffin: x15 minutes to L hand and wrist with digits taped into felxion with cobabbey MHP over. Paraffin Location: Left, Hand          Activity Tolerance: Additional Comments: Patient tolerated treatment well. Assessment:  Assessment: Patient is progressing towards goals. Patient Education:  Patient Education: Reviewed next scheduled appointment. Plan:  Plan Comment: Continue per established POC.                     CORONA Briceño CHARLES/L #[id number]

## 2019-05-09 ENCOUNTER — HOSPITAL ENCOUNTER (OUTPATIENT)
Dept: OCCUPATIONAL THERAPY | Age: 59
Setting detail: THERAPIES SERIES
Discharge: HOME | End: 2019-05-09
Payer: COMMERCIAL

## 2019-05-09 PROCEDURE — 97018 PARAFFIN BATH THERAPY: CPT

## 2019-05-09 PROCEDURE — 97110 THERAPEUTIC EXERCISES: CPT

## 2019-05-09 ASSESSMENT — PAIN DESCRIPTION - LOCATION: LOCATION: WRIST;HAND

## 2019-05-09 ASSESSMENT — PAIN DESCRIPTION - ORIENTATION: ORIENTATION: LEFT

## 2019-05-09 ASSESSMENT — PAIN SCALES - GENERAL: PAINLEVEL_OUTOF10: 3

## 2019-05-14 ENCOUNTER — HOSPITAL ENCOUNTER (OUTPATIENT)
Dept: OCCUPATIONAL THERAPY | Age: 59
Setting detail: THERAPIES SERIES
Discharge: HOME | End: 2019-05-14
Payer: COMMERCIAL

## 2019-05-14 PROCEDURE — 97018 PARAFFIN BATH THERAPY: CPT

## 2019-05-14 PROCEDURE — 97110 THERAPEUTIC EXERCISES: CPT

## 2019-05-14 ASSESSMENT — PAIN DESCRIPTION - LOCATION: LOCATION: WRIST;HAND

## 2019-05-14 ASSESSMENT — PAIN DESCRIPTION - ORIENTATION: ORIENTATION: LEFT

## 2019-05-14 ASSESSMENT — PAIN SCALES - GENERAL: PAINLEVEL_OUTOF10: 2

## 2019-05-14 NOTE — PROGRESS NOTES
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 States that he is still not able to lift \" a lot\" with his left hand (such as swinging a hammer, moving things in garage, putting tools away). Pain:  Patient Currently in Pain: Yes  Pain Assessment: 0-10  Pain Level: 2  Pain Location: Wrist, Hand  Pain Orientation: Left       Objective:     Upper Extremity Function  UE AROM: active left wrist flexion/extension; active left fisting; left wrist circumduction x 15 reps in each direction; active left hand hook fisting x 15 rep; demonstrated improved MP flexion/PIP and DIP extension with much less difficulty  UE PROM: PROM to left wrist and forearm to patient tolerance; PROM to left digits with popping occuring in MP and PIP joints                                     Paraffin  Number Minutes Paraffin: x15 minutes to L hand and wrist with digits taped into flexion with coban and MHP placed over hand during modality          Activity Tolerance: Additional Comments: Patient tolerated treatment well. Assessment:  Assessment: Patient is progressing towards goals. Patient needs to move his left wrist in various directions/movement patterns in order to complete work tasks. Patient Education:  Patient Education: Patient asked about whether or not he would achieve more radial deviation of left wrist - explained to patient that due to the carpal row being removed that it would be difficult to achieve more RD and that there is a hard end feel wtih passive RD. Plan:  Plan Comment: Continue per established POC.    Specific instructions for Next Treatment: paraffin with fingers cobaned into flexion, PROM left wrist, forearm, and hand                   Conner Jones OTR/L #[id number]

## 2019-05-16 ENCOUNTER — HOSPITAL ENCOUNTER (OUTPATIENT)
Dept: OCCUPATIONAL THERAPY | Age: 59
Setting detail: THERAPIES SERIES
Discharge: HOME | End: 2019-05-16
Payer: COMMERCIAL

## 2019-05-16 PROCEDURE — 97018 PARAFFIN BATH THERAPY: CPT

## 2019-05-16 PROCEDURE — 97110 THERAPEUTIC EXERCISES: CPT

## 2019-05-16 ASSESSMENT — PAIN DESCRIPTION - ORIENTATION: ORIENTATION: LEFT

## 2019-05-16 ASSESSMENT — PAIN DESCRIPTION - LOCATION: LOCATION: SHOULDER

## 2019-05-16 ASSESSMENT — PAIN SCALES - GENERAL: PAINLEVEL_OUTOF10: 3

## 2019-05-16 NOTE — PROGRESS NOTES
[address]  OUTPATIENT OCCUPATIONAL THERAPY  Daily Note  [address] [facility]    Time In:   Time Out: [facility]  Minutes: 40  Timed Code Treatment Minutes: 25 Minutes                Date: 2019  Patient Name: Portia Azar        CSN: [id number]   : 1960  (62 y.o.)  Gender: male   Referring Practitioner: Jesusita Gray PA-C  Diagnosis: primary osteoarthritis of left wrist M19.032          General:  OT Visit Information  Onset Date: 19  OT Insurance Information: Damion Harvey - no massage  Total # of Visits Approved: 25  Total # of Visits to Date: 7  Certification Period Expiration Date: 19  Progress Note Counter: 7/10 for PN  Comments: returns to physician on        Restrictions/Precautions:       Position Activity Restriction  Other position/activity restrictions: HTN, no lifting more than 10#; left wrist surgery on 18         Subjective:  Subjective: States that the hand is a little sore today. States that he did a little bit of painting with his left hand last night and today. States that he did have pain when he was doing the exercises. Pain:  Patient Currently in Pain: Yes  Pain Assessment: 0-10  Pain Level: 3  Pain Location: Shoulder  Pain Orientation: Left       Objective:     Upper Extremity Function  UE AROM: active left wrist flexion/extension x 15 reps; active left wrist circumduction x 15 reps in each direction; active left fisting x 15 reps; active hook fisting x 15 reps with left hand; patient does demonstrate slightly decreased ability to complete MP extension of L2-5  UE PROM: PROM to left wrist and forearm to patient tolerance; PROM to left digits with popping occuring in MP and PIP joints                                     Paraffin  Number Minutes Paraffin: x15 minutes to L hand and wrist with digits taped into flexion with coban and MHP placed over hand during modality          Activity Tolerance:   Additional Comments: Patient tolerated treatment well. Assessment:  Assessment: Progressing toward goals    Patient Education:     No new patient education completed this date          Plan:  Plan Comment: Continue per established POC.    Specific instructions for Next Treatment: paraffin with fingers cobaned into flexion, PROM left wrist, forearm, and hand                   Nida Scott, OTR/L #[id number]

## 2019-05-21 ENCOUNTER — HOSPITAL ENCOUNTER (OUTPATIENT)
Dept: OCCUPATIONAL THERAPY | Age: 59
Setting detail: THERAPIES SERIES
Discharge: HOME | End: 2019-05-21
Payer: COMMERCIAL

## 2019-05-21 PROCEDURE — 97018 PARAFFIN BATH THERAPY: CPT

## 2019-05-21 PROCEDURE — 97110 THERAPEUTIC EXERCISES: CPT

## 2019-05-21 ASSESSMENT — PAIN DESCRIPTION - ORIENTATION: ORIENTATION: LEFT

## 2019-05-21 ASSESSMENT — PAIN SCALES - GENERAL: PAINLEVEL_OUTOF10: 5

## 2019-05-21 ASSESSMENT — PAIN DESCRIPTION - LOCATION: LOCATION: HAND

## 2019-05-21 NOTE — PROGRESS NOTES
[facility]  OUTPATIENT OCCUPATIONAL THERAPY  Daily Note  [address] [facility]    Time In: 09  Time Out: [address]  Minutes: 40  Timed Code Treatment Minutes: 25 Minutes                Date: 2019  Patient Name: Komal Price        CSN: [id number]   : 1960  (62 y.o.)  Gender: male   Referring Practitioner: Francheska Rinaldi PA-C  Diagnosis: primary osteoarthritis of left wrist M19.032          General:  OT Visit Information  Onset Date: 19  OT Insurance Information: Enedina Arshad - no massage  Total # of Visits Approved: 25  Total # of Visits to Date: 8  Certification Period Expiration Date: 19  Progress Note Counter: 8/10 for PN  Comments: returns to physician on        Restrictions/Precautions:       Position Activity Restriction  Other position/activity restrictions: HTN, no lifting more than 10#; left wrist surgery on 18         Subjective:  Subjective: States that his left hand is stiff and sore today. States that he did take a motorcycle ride, but he doesn't think that caused the pain/stiffness. Pain:  Patient Currently in Pain: Yes  Pain Assessment: 0-10  Pain Level: 5  Pain Location: Hand  Pain Orientation: Left       Objective:     Upper Extremity Function  UE AROM: active left wrist flexion/extension x 15 reps; active left wrist circumduction x 15 reps in each direction; coordination maze with left UE x 10 reps  UE PROM: PROM to left wrist, forearm, and digits to patient tolerance  UE Stretching: massage to left forearm with tightness present on anterior forearm  UE Strengthing: medium theraputty - taffy pulls with both hands x 20 reps                                     Paraffin  Number Minutes Paraffin: x15 minutes to L hand and wrist with digits taped into flexion with coban and MHP placed over hand during modality          Activity Tolerance: Additional Comments: Patient tolerated treatment well.      Assessment:  Assessment: Progressing toward goals    Patient Education:  Patient Education: to try heat at home for pain management            Plan:  Plan Comment: Continue per established POC.    Specific instructions for Next Treatment: paraffin with fingers cobaned into flexion, PROM left wrist, forearm, and hand                   Yaniv Warner, OTR/L #[id number]

## 2019-05-23 ENCOUNTER — APPOINTMENT (OUTPATIENT)
Dept: OCCUPATIONAL THERAPY | Age: 59
End: 2019-05-23
Payer: COMMERCIAL

## 2019-05-28 ENCOUNTER — HOSPITAL ENCOUNTER (OUTPATIENT)
Dept: OCCUPATIONAL THERAPY | Age: 59
Setting detail: THERAPIES SERIES
Discharge: HOME | End: 2019-05-28
Payer: COMMERCIAL

## 2019-05-28 PROCEDURE — 97110 THERAPEUTIC EXERCISES: CPT

## 2019-05-28 PROCEDURE — 97018 PARAFFIN BATH THERAPY: CPT

## 2019-05-28 ASSESSMENT — PAIN DESCRIPTION - LOCATION: LOCATION: HAND

## 2019-05-28 ASSESSMENT — PAIN SCALES - GENERAL: PAINLEVEL_OUTOF10: 2

## 2019-05-28 ASSESSMENT — PAIN DESCRIPTION - ORIENTATION: ORIENTATION: LEFT

## 2019-05-30 ENCOUNTER — HOSPITAL ENCOUNTER (OUTPATIENT)
Dept: OCCUPATIONAL THERAPY | Age: 59
Setting detail: THERAPIES SERIES
Discharge: HOME | End: 2019-05-30
Payer: COMMERCIAL

## 2019-05-30 PROCEDURE — 97110 THERAPEUTIC EXERCISES: CPT

## 2019-05-30 PROCEDURE — 97018 PARAFFIN BATH THERAPY: CPT

## 2019-05-30 ASSESSMENT — PAIN DESCRIPTION - ORIENTATION: ORIENTATION: LEFT

## 2019-05-30 ASSESSMENT — PAIN DESCRIPTION - LOCATION: LOCATION: WRIST

## 2019-05-30 ASSESSMENT — PAIN SCALES - GENERAL: PAINLEVEL_OUTOF10: 2

## 2019-05-30 NOTE — FLOWSHEET NOTE
** PLEASE SIGN, DATE AND TIME CERTIFICATION BELOW AND RETURN TO [facility] OUTPATIENT REHABILITATION (FAX #: [phone]). ATTEST/CO-SIGN IF ACCESSING VIA [employer]. THANK YOU.**    I certify that I have examined the patient below and determined that Physical Medicine and Rehabilitation service is necessary and that I approve the established plan of care for up to 90 days or as specifically noted. Attestation, signature or co-signature of physician indicates approval of certification requirements.    ________________________ ____________ __________  Physician Signature   Date   Time      [facility]  OUTPATIENT OCCUPATIONAL THERAPY  Progress Note  600 [facility]    Time In: 9712  Time Out: 1130  Minutes: 55  Timed Code Treatment Minutes: 40 Minutes                Date: 2019  Patient Name: Lito Rice        CSN: [id number]   : 1960  (62 y.o.)  Gender: male   Referring Practitioner: Diane Knapp PA-C  Diagnosis: primary osteoarthritis of left wrist M19.032          General:  OT Visit Information  Onset Date: 19  OT Insurance Information: Phil Félix - no massage  Total # of Visits Approved: 25  Total # of Visits to Date: 10  Certification Period Expiration Date: 19  Progress Note Counter: PN completed on   Comments: returns to physician on        Restrictions/Precautions:       Position Activity Restriction  Other position/activity restrictions: HTN, no lifting more than 10#; left wrist surgery on 18         Subjective:  Subjective: States that since he started OT he feels as though he has more motion in his left hand. States that he feels as though he doesn't have enough strength in his left hand. States that he is still not able to use his left hand to perform toileting after BM, not able to open items, using his left hand to wash his back. States that he is not able to flip his left hand over to do some tasks.  States that he is concerned about left wrist and forearm along with strength of left UE. Patient Education:  Patient Education: goal status; under-the-table supination stretch            Plan:  Times per week: 2 x week  Plan weeks: 6 weeks  Plan Comment: POC updated and discussed with patient  Specific instructions for Next Treatment: paraffin with fingers cobaned into flexion, PROM left wrist, forearm, and hand; AROM left forearm/wrist;  strengthening    Patient goals : To be able to ride my motorcycle. To be able to get back to work. Short term goals  Time Frame for Short term goals: 4 weeks  Short term goal 1: Be independent with HEP as instructed to increase his ability to use his left hand for writing and eating. GOAL MET - CONTINUE GOAL WITH HEP UPGRADES  Short term goal 2: Increase active left supination to 65, wrist flexion to 55, and wrist extension to 50 to increase his ability to move clutch on motorcycle. GOAL NOT MET - SEE OBJECTIVE SECTION OF NOTE FOR DETAILS - REVISED GOAL: Increase active left supination to 65, wrist flexion to 55, and wrist extension to 55 to increase his ability to perform toileting tasks. Short term goal 3: Increase LALA of L1 to 80, L2 to 230, L3 to 245, L4 to 240, and L5 to 240 to increase his ability to hold onto eating utensils. GOAL MET FOR L1, GOAL NOT MET FOR L2-5; SEE OBJECTIVE SECTION OF NOTE FOR DETAILS ; PATIENT HAS SIGNFICANT L3-5 MP EXTENSION LAG - REVISED GOAL: Increase LALA of L2 to 240, L3 to 220, L4 to 235, and L5 to 235 to increase his ability to hold onto work tools. Short term goal 4: Report pain going no higher than 3/10 in left wrist/hand at any time to increase his ability to perform household tasks. GOAL NOT MET - RELATES THAT PAIN WENT TO 6/10 IN THE PAST WEEK. STATES THAT HE WOKE UP WITH THIS LEVEL OF PAIN THAT DAY AND IS NOT SURE OF WHY THE PAIN WAS HIGH - CONTINUE GOAL  Long term goals  Time Frame for Long term goals : 6 weeks  Long term goal 1:  Increase left  strength to at least 30# to increase his ability to perform work tasks. GOAL NOT MET -SEE OBJECTIVE SECTION OF NOTE FOR DETAILS - REVISED GOAL: Increase left  strength to at least 40# to increase ability to hold onto work tools. Long term goal 2: Be able to button pants and peform toileting tasks with left hand without difficulty. GOAL NOT MET -RELATES THAT HE IS ABLE TO BUTTON LOOSE PANTS, BUT NOT BLUEJEANS. NOT ABLE TO USE LEFT HAND TO PERFORM TOILETING TASKS - CONTINUE GOAL  Long term goal 3: Be able to feed self using left hand without difficulty. GOAL MET, BUT RELATES THAT HE HAS TO USE DIFFERENT TECHNIQUE - REVISED GOAL: Be able to cut meat without difficulty. Long term goal 4: Be able to open food and drink containers using both hands without difficulty.  GOAL NOT MET - RELATES THAT HE CONTINUES TO HAVE DIFFICULTY WITH THIS TASK - CONTINUE GOAL         Brenda Pang, OTR/L #[id number]

## 2019-06-03 ENCOUNTER — HOSPITAL ENCOUNTER (OUTPATIENT)
Dept: OCCUPATIONAL THERAPY | Age: 59
Setting detail: THERAPIES SERIES
Discharge: HOME | End: 2019-06-03
Payer: COMMERCIAL

## 2019-06-03 PROCEDURE — 97110 THERAPEUTIC EXERCISES: CPT

## 2019-06-03 ASSESSMENT — PAIN DESCRIPTION - LOCATION: LOCATION: WRIST

## 2019-06-03 ASSESSMENT — PAIN SCALES - GENERAL: PAINLEVEL_OUTOF10: 2

## 2019-06-03 ASSESSMENT — PAIN DESCRIPTION - ORIENTATION: ORIENTATION: LEFT

## 2019-06-03 NOTE — PROGRESS NOTES
[address] [address]  OUTPATIENT OCCUPATIONAL THERAPY  Daily Note  600 [facility].    Time In: 1030  Time Out: 1110  Minutes: 40  Timed Code Treatment Minutes: 40 Minutes                Date: 6/3/2019  Patient Name: Radha Blanco        CSN: [id number]   : 1960  (62 y.o.)  Gender: male   Referring Practitioner: Anny Odell PA-C  Diagnosis: primary osteoarthritis of left wrist M19.032          General:  OT Visit Information  Onset Date: 19  OT Insurance Information: Vallorie Balloon - no massage  Total # of Visits Approved: 25  Total # of Visits to Date: 6  Certification Period Expiration Date: 19  Progress Note Counter: PN completed on ; 1/10 for PN  Comments: returns to physician on        Restrictions/Precautions:       Position Activity Restriction  Other position/activity restrictions: HTN, no lifting more than 10#; left wrist surgery on 18         Subjective:  Subjective: Patient reports he went on a motorcycle ride to and from [facility] over the weekend, this did create some increased soreness in his left wrist. States \"after awhile I had to adjust how I was holding it. \"           Pain:  Patient Currently in Pain: Yes  Pain Assessment: 0-10  Pain Level: 2  Pain Location: Wrist  Pain Orientation: Left       Objective:     Upper Extremity Function  UE AROM: Active L wrist extension over towel roll x10, circumduction x10 each direction, flexion x10, elbow at side forearm pronation/supination with assist from RUE for full supination x10 reps, tendon glides x10.   UE PROM: Seated PROM to L wrist with digits straight and flexed all motions to tolerance with tightness noted at end ranges, PROM L digits individual and composite joints place and holds to tolerance, PROM L forearm with elbow at side - tightness noted in supination.    UE Strengthing: Soft yellow putty taffy pulls x2 minutes, squeezing/manipulating x2 minutes with \"discomfort\" in the wrist, rolling and pinching x2 minutes. Paraffin  Number Minutes Paraffin: x15 minutes to L hand and wrist with digits taped into flexion with coban. Paraffin Location: Left, Hand          Activity Tolerance: Additional Comments: Patient tolerated treatment well. Does have slight pain increased at end of session. Assessment:  Assessment: Patient is progressing towards goals. Patient Education:  Patient Education: Reviewed next scheduled appointment. Plan:  Plan Comment: Continue per established POC.                     CORONA Briceño CHARLES/L #[id number]

## 2019-06-06 ENCOUNTER — HOSPITAL ENCOUNTER (OUTPATIENT)
Dept: OCCUPATIONAL THERAPY | Age: 59
Setting detail: THERAPIES SERIES
End: 2019-06-06
Payer: COMMERCIAL

## 2019-06-07 ENCOUNTER — HOSPITAL ENCOUNTER (OUTPATIENT)
Dept: OCCUPATIONAL THERAPY | Age: 59
Setting detail: THERAPIES SERIES
Discharge: HOME | End: 2019-06-07
Payer: COMMERCIAL

## 2019-06-07 PROCEDURE — 97110 THERAPEUTIC EXERCISES: CPT

## 2019-06-07 PROCEDURE — 97022 WHIRLPOOL THERAPY: CPT

## 2019-06-07 ASSESSMENT — PAIN SCALES - GENERAL: PAINLEVEL_OUTOF10: 2

## 2019-06-07 ASSESSMENT — PAIN DESCRIPTION - LOCATION: LOCATION: WRIST

## 2019-06-07 ASSESSMENT — PAIN DESCRIPTION - ORIENTATION: ORIENTATION: LEFT

## 2019-06-07 NOTE — PROGRESS NOTES
[facility]  OUTPATIENT OCCUPATIONAL THERAPY  Daily Note  [address] [facility]    Time In: 915  Time Out: 1000  Minutes: 45  Timed Code Treatment Minutes: 30 Minutes                Date: 2019  Patient Name: Merlene Harry        CSN: [id number]   : 1960  (62 y.o.)  Gender: male   Referring Practitioner: Adelaida Day PA-C  Diagnosis: primary osteoarthritis of left wrist M19.032          General:  OT Visit Information  Onset Date: 19  OT Insurance Information: Keiko Loan - no massage  Total # of Visits Approved: 25  Total # of Visits to Date: 12  Certification Period Expiration Date: 19  Progress Note Counter: PN completed on ; 2/10 for PN  Comments: returns to physician on        Restrictions/Precautions:       Position Activity Restriction  Other position/activity restrictions: HTN, no lifting more than 10#; left wrist surgery on 18         Subjective:  Subjective: States that 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 Was told that he could come back for 4-6 hour days with a physician note. States that he would like to be able to return to work on .           Pain:  Patient Currently in Pain: Yes  Pain Assessment: 0-10  Pain Level: 2  Pain Location: Wrist  Pain Orientation: Left       Objective:     Upper Extremity Function  UE AROM: active left wrist extension/flexion x 10 reps; active left wrist radial/ulnar deviation x 10 reps; coordination maze with left UE x 10 reps to address AROM of left wrist/forearm  UE PROM: PROM to left wrist and forearm to patient tolerance  UE Strengthing: green therabar - 20 reps bending bar with forearms pronated and 20 reps bending bar with forearms supinated; ergogripper with 3 blue and 2 green bands x 20 reps with left UE -did have slight difficulty completing this, but was able to do it with increased time; medium theraputty - knob turn putty tool x 10 reps with left UE                                     Fluidotherapy  Number Minutes Fluidotherapy: 15 minutes to left wrist and hand prior to treatment          Activity Tolerance: Additional Comments: Tolerated session well    Assessment:  Assessment: Patient is progressing towards goals. Patient Education:  Patient Education: to try heat after doing exercises            Plan:  Plan Comment: Continue per established POC.    Specific instructions for Next Treatment: fluido, PROM left wrist, forearm, and hand; AROM left forearm/wrist;  strengthening                   Cyndra Aase, OTR/L #[id number]

## 2019-06-10 ENCOUNTER — HOSPITAL ENCOUNTER (OUTPATIENT)
Dept: OCCUPATIONAL THERAPY | Age: 59
Setting detail: THERAPIES SERIES
Discharge: HOME | End: 2019-06-10
Payer: COMMERCIAL

## 2019-06-10 PROCEDURE — 97022 WHIRLPOOL THERAPY: CPT

## 2019-06-10 PROCEDURE — 97110 THERAPEUTIC EXERCISES: CPT

## 2019-06-10 ASSESSMENT — PAIN DESCRIPTION - LOCATION: LOCATION: WRIST

## 2019-06-10 ASSESSMENT — PAIN DESCRIPTION - ORIENTATION: ORIENTATION: LEFT

## 2019-06-10 ASSESSMENT — PAIN SCALES - GENERAL: PAINLEVEL_OUTOF10: 1

## 2019-06-10 NOTE — PROGRESS NOTES
[address] [address]  OUTPATIENT OCCUPATIONAL THERAPY  Daily Note  600 [facility].    Time In: 8660  Time Out: [address]  Minutes: 45  Timed Code Treatment Minutes: 30 Minutes                Date: 6/10/2019  Patient Name: Danita Gallego        CSN: [id number]   : 1960  (62 y.o.)  Gender: male   Referring Practitioner: Ana Andres PA-C  Diagnosis: primary osteoarthritis of left wrist M19.032          General:  OT Visit Information  Onset Date: 19  OT Insurance Information: Deena Lopesaustin - no massage  Total # of Visits Approved: 25  Total # of Visits to Date: 15  Certification Period Expiration Date: 19  Progress Note Counter: PN completed on ; 3/10 for PN  Comments: returns to physician on        Restrictions/Precautions:       Position Activity Restriction  Other position/activity restrictions: HTN, no lifting more than 10#; left wrist surgery on 18         Subjective:  Subjective: States that his wrist is \"pretty good. \" States that he doesn't have much pain.            Pain:  Patient Currently in Pain: Yes  Pain Assessment: 0-10  Pain Level: 1  Pain Location: Wrist  Pain Orientation: Left       Objective:     Upper Extremity Function  UE AROM: coordination maze x 5 reps with left UE; active left wrist flexion/extension x15 reps, left circumduction x 15 reps  UE Strengthing: green therabar - 15 reps bending bar with forearms pronated and 15 reps bending bar with forearms supinated; ergogripper wtih 4 blue and 2 green bands x 20 reps with left UE; resistive clothespins with left hand - able to complete all resistances and related that it wasn't as bad as the last time he completed this task; wrist exerciser with 1# - completed 5 reps using left hand on bar; medium theraputty - digging beads out of putty and patient reported having fatigue and pain in left hand/wrist                                     Fluidotherapy  Number Minutes Fluidotherapy: 15 minutes to left wrist and hand prior to treatment          Activity Tolerance: Additional Comments: Tolerated session well    Assessment:  Assessment: Patient is progressing towards goals. Patient is experiencing pain in left wrist with resistive activities. Patient Education:  Patient Education: to speak with physician about wearing splint for work tasks            Plan:  Plan Comment: Continue per established POC.    Specific instructions for Next Treatment: fluido, PROM left wrist, forearm, and hand; AROM left forearm/wrist;  strengthening                   Lois Siemens, OTR/L #[id number]

## 2019-06-13 ENCOUNTER — HOSPITAL ENCOUNTER (OUTPATIENT)
Dept: OCCUPATIONAL THERAPY | Age: 59
Setting detail: THERAPIES SERIES
Discharge: HOME | End: 2019-06-13
Payer: COMMERCIAL

## 2019-06-13 PROCEDURE — 97110 THERAPEUTIC EXERCISES: CPT

## 2019-06-13 PROCEDURE — 97022 WHIRLPOOL THERAPY: CPT

## 2019-06-13 NOTE — PROGRESS NOTES
Minutes Fluidotherapy: 15 minutes to left wrist and hand prior to treatment          Activity Tolerance: Additional Comments: Tolerated session well    Assessment:  Assessment: Progressing toward goals. Patient does continue to report pain in left wrist (especially on radial side of wrist)    Patient Education:  Patient Education: new schedule; to inform therapy staff what is most difficult for him to complete at work            Plan:  Plan Comment: Continue per established POC.    Specific instructions for Next Treatment: fluido, PROM left wrist, forearm, and hand; AROM left forearm/wrist;  strengthening                   Parvez Mcneil OTR/L #[id number]

## 2019-06-18 ENCOUNTER — HOSPITAL ENCOUNTER (OUTPATIENT)
Dept: OCCUPATIONAL THERAPY | Age: 59
Setting detail: THERAPIES SERIES
Discharge: HOME | End: 2019-06-18
Payer: COMMERCIAL

## 2019-06-18 PROCEDURE — 97110 THERAPEUTIC EXERCISES: CPT

## 2019-06-18 PROCEDURE — 97022 WHIRLPOOL THERAPY: CPT

## 2019-06-18 ASSESSMENT — PAIN SCALES - GENERAL: PAINLEVEL_OUTOF10: 5

## 2019-06-18 ASSESSMENT — PAIN DESCRIPTION - ORIENTATION: ORIENTATION: LEFT

## 2019-06-18 ASSESSMENT — PAIN DESCRIPTION - LOCATION: LOCATION: WRIST

## 2019-06-18 NOTE — PROGRESS NOTES
[facility]  OUTPATIENT OCCUPATIONAL THERAPY  Daily Note  [address]    Time In: 1330  Time Out: 9157  Minutes: 42  Timed Code Treatment Minutes: 27 Minutes                Date: 2019  Patient Name: Danita Gallego        CSN: [id number]   : 1960  (62 y.o.)  Gender: male   Referring Practitioner: Ana Andres PA-C  Diagnosis: primary osteoarthritis of left wrist M19.032          General:  OT Visit Information  Onset Date: 19  OT Insurance Information: Deena Call - no massage  Total # of Visits Approved: 25  Total # of Visits to Date: 13  Certification Period Expiration Date: 19  Progress Note Counter: PN completed on ; 5/10 for PN  Comments: does not return to physician unless needed       Restrictions/Precautions:       Position Activity Restriction  Other position/activity restrictions: HTN, no lifting more than 10#; left wrist surgery on 18         Subjective:  Subjective: Reports increased soreness as a result of returning to work this week for 4 hour days. Remains on the 4 hour days for 2 weeks is needed. He is glad that he is only returning to 4 hours now, he is sore.             Pain:  Patient Currently in Pain: Yes  Pain Assessment: 0-10  Pain Level: 5  Pain Location: Wrist  Pain Orientation: Left       Objective:     Upper Extremity Function  UE AROM: coordination maze x 5 reps with left UE; wrist circumduction, flexion and extension to cool down 10 reps each  UE PROM: PROM to left wrist and forearm to patient tolerance  UE Stretching: under-the-table supination stretch with left UE x 5 reps with 10 second hold  UE Strengthin# wrist PRE's 10 reps; green therabar up and down 5 reps and twist 10 reps         Additional Activities Comment  Additional Activities: biofreeze at the end of the session for symptom management       Fluidotherapy  Number Minutes Fluidotherapy: 15 minutes to left wrist and hand prior to treatment Activity Tolerance: Additional Comments: Tolerated session well    Assessment:  Assessment: Progressing toward goals. Decreased activity this date due to return to work activity. Patient Education:  Patient Education: decrease activity for symptom management            Plan:  Current Treatment Recommendations: Strengthening, ROM, Self-Care / ADL, Patient/Caregiver Education & Training, Pain Management, Modalities (comment), Other (comment)  Plan Comment: Continue per established POC.    Specific instructions for Next Treatment: fluido, PROM left wrist, forearm, and hand; AROM left forearm/wrist;  strengthening         Estrella Osullivan, SHEY, OTR/L [id number]

## 2019-06-21 ENCOUNTER — HOSPITAL ENCOUNTER (OUTPATIENT)
Dept: OCCUPATIONAL THERAPY | Age: 59
Setting detail: THERAPIES SERIES
End: 2019-06-21
Payer: COMMERCIAL

## 2019-06-24 ENCOUNTER — HOSPITAL ENCOUNTER (OUTPATIENT)
Dept: OCCUPATIONAL THERAPY | Age: 59
Setting detail: THERAPIES SERIES
Discharge: HOME | End: 2019-06-24
Payer: COMMERCIAL

## 2019-06-24 PROCEDURE — 97110 THERAPEUTIC EXERCISES: CPT

## 2019-06-24 PROCEDURE — 97022 WHIRLPOOL THERAPY: CPT

## 2019-06-24 ASSESSMENT — PAIN SCALES - GENERAL: PAINLEVEL_OUTOF10: 2

## 2019-06-24 ASSESSMENT — PAIN DESCRIPTION - ORIENTATION: ORIENTATION: LEFT

## 2019-06-24 ASSESSMENT — PAIN DESCRIPTION - LOCATION: LOCATION: WRIST

## 2019-06-28 ENCOUNTER — HOSPITAL ENCOUNTER (OUTPATIENT)
Dept: OCCUPATIONAL THERAPY | Age: 59
Setting detail: THERAPIES SERIES
Discharge: HOME | End: 2019-06-28
Payer: COMMERCIAL

## 2019-06-28 PROCEDURE — 97110 THERAPEUTIC EXERCISES: CPT

## 2019-06-28 PROCEDURE — 97022 WHIRLPOOL THERAPY: CPT

## 2019-06-28 ASSESSMENT — PAIN SCALES - GENERAL: PAINLEVEL_OUTOF10: 2

## 2019-06-28 ASSESSMENT — PAIN DESCRIPTION - LOCATION: LOCATION: WRIST

## 2019-06-28 ASSESSMENT — PAIN DESCRIPTION - ORIENTATION: ORIENTATION: UPPER

## 2019-06-28 NOTE — PROGRESS NOTES
manipulation . Ergo 4 blue bands for improved hand strength 2 sets of 10 gripping                                       Paraffin  Number Minutes Paraffin: 15 minutes to L hand and wrist with digits taped into flexion with coban. Fluidotherapy  Number Minutes Fluidotherapy: 12 minutes to left wrist and hand prior to treatment          Activity Tolerance: Additional Comments: Tolerated session well. Patient reports he tolerated session well. Assessment:  Assessment: Patient is progressing towards goals. Patient Education:  Patient Education: No changes to HEP. Plan:  Times per week: 2 x week  Plan weeks: 6 weeks  Plan Comment: Continue per established POC.                     PKenzie CHARLES/BRAULIO 111

## 2019-07-02 ENCOUNTER — HOSPITAL ENCOUNTER (OUTPATIENT)
Dept: OCCUPATIONAL THERAPY | Age: 59
Setting detail: THERAPIES SERIES
Discharge: HOME | End: 2019-07-02
Payer: COMMERCIAL

## 2019-07-02 PROCEDURE — 97022 WHIRLPOOL THERAPY: CPT

## 2019-07-02 PROCEDURE — 97110 THERAPEUTIC EXERCISES: CPT

## 2019-07-02 ASSESSMENT — PAIN DESCRIPTION - LOCATION: LOCATION: WRIST

## 2019-07-02 ASSESSMENT — PAIN SCALES - GENERAL: PAINLEVEL_OUTOF10: 3

## 2019-07-02 ASSESSMENT — PAIN DESCRIPTION - ORIENTATION: ORIENTATION: LEFT

## 2019-07-09 ENCOUNTER — HOSPITAL ENCOUNTER (OUTPATIENT)
Dept: OCCUPATIONAL THERAPY | Age: 59
Setting detail: THERAPIES SERIES
Discharge: HOME | End: 2019-07-09
Payer: COMMERCIAL

## 2019-07-09 PROCEDURE — 97110 THERAPEUTIC EXERCISES: CPT

## 2019-07-09 PROCEDURE — 97022 WHIRLPOOL THERAPY: CPT

## 2019-07-09 ASSESSMENT — PAIN DESCRIPTION - LOCATION: LOCATION: WRIST

## 2019-07-09 ASSESSMENT — PAIN SCALES - GENERAL: PAINLEVEL_OUTOF10: 2

## 2019-07-09 ASSESSMENT — PAIN DESCRIPTION - ORIENTATION: ORIENTATION: LEFT

## 2019-07-09 NOTE — PROGRESS NOTES
Education:  Patient Education: Planned discharge at next session            Plan:  Plan Comment: Continue per established POC; planned discharge from therapy at next session  Specific instructions for Next Treatment: fluido, PROM left wrist, forearm, and hand; AROM left forearm/wrist;  strengthening                   Carolyn Olmedo OTR/L #[id number]

## 2019-07-11 ENCOUNTER — HOSPITAL ENCOUNTER (OUTPATIENT)
Dept: OCCUPATIONAL THERAPY | Age: 59
Setting detail: THERAPIES SERIES
Discharge: HOME | End: 2019-07-11
Payer: COMMERCIAL

## 2019-07-11 PROCEDURE — 97110 THERAPEUTIC EXERCISES: CPT

## 2019-07-11 PROCEDURE — 97022 WHIRLPOOL THERAPY: CPT

## 2019-07-11 ASSESSMENT — PAIN DESCRIPTION - LOCATION: LOCATION: WRIST

## 2019-07-11 ASSESSMENT — PAIN DESCRIPTION - ORIENTATION: ORIENTATION: LEFT

## 2019-07-11 ASSESSMENT — PAIN SCALES - GENERAL: PAINLEVEL_OUTOF10: 2

## 2019-07-11 NOTE — DISCHARGE SUMMARY
well    Assessment:  Assessment: Patient has made nice progress since initiating therapy, but patient does continue to have pain in left wrist. Patient does also continue to have some difficulty with left forearm supination which interferes with toileting tasks. Patient has been instructed to continue with HEP after discharge from therapy. Patient Education:  Patient Education: goal status; to continue with HEP after discharge from therapy            Plan:  Plan Comment: Discharge from therapy as patient is able to complete HEP without assistance. Patient goals : To be able to ride my motorcycle. To be able to get back to work. Short term goals  Time Frame for Short term goals: 4 weeks  Short term goal 1: Be independent with HEP as instructed to increase his ability to use his left hand for writing and eating. GOAL MET   Short term goal 2: Increase active left supination to 65, wrist flexion to 55, and wrist extension to 55 to increase his ability to perform toileting tasks. GOAL MET FOR SUPINATION AND WRIST FLEXION; GOAL NOT MET FOR WRIST EXTENSION - SEE OBJECTIVE SECTION OF NOTE FOR DETAILS  Short term goal 3: Increase LALA of L2 to 240, L3 to 220, L4 to 235, and L5 to 235 to increase his ability to hold onto work tools. GOAL MET FOR L3 AND L5, GOAL NOT MET FOR L2 AND L4 - SEE OBJECTIVE SECTION OF NOTE FOR DETAILS  Short term goal 4: Report pain going no higher than 3/10 in left wrist/hand at any time to increase his ability to perform household tasks. GOAL NOT MET - RELATES THAT PAIN IS AT 4/10 AT END OF WORK DAY. RELATES THAT PAIN DECREASES QUICKLY AFTER WORK. Long term goals  Time Frame for Long term goals : 6 weeks  Long term goal 1: Increase left  strength to at least 40# to increase ability to hold onto work tools. GOAL NOT MET - SEE OBJECTIVE SECTION OF NOTE FOR DETAILS  Long term goal 2: Be able to button pants and peform toileting tasks with left hand without difficulty.  GOAL NOT MET -

## 2019-07-16 ENCOUNTER — APPOINTMENT (OUTPATIENT)
Dept: OCCUPATIONAL THERAPY | Age: 59
End: 2019-07-16
Payer: COMMERCIAL

## 2019-07-17 DIAGNOSIS — I10 ESSENTIAL HYPERTENSION: Chronic | ICD-10-CM

## 2019-07-17 RX ORDER — LISINOPRIL 5 MG/1
5 TABLET ORAL DAILY
Qty: 30 TABLET | Refills: 0 | Status: SHIPPED | OUTPATIENT
Start: 2019-07-17 | End: 2019-08-22 | Stop reason: SDUPTHER

## 2019-07-18 ENCOUNTER — APPOINTMENT (OUTPATIENT)
Dept: OCCUPATIONAL THERAPY | Age: 59
End: 2019-07-18
Payer: COMMERCIAL

## 2019-08-22 ENCOUNTER — OFFICE VISIT (OUTPATIENT)
Dept: FAMILY MEDICINE CLINIC | Age: 59
End: 2019-08-22
Payer: COMMERCIAL

## 2019-08-22 VITALS
WEIGHT: 157.2 LBS | DIASTOLIC BLOOD PRESSURE: 76 MMHG | SYSTOLIC BLOOD PRESSURE: 132 MMHG | BODY MASS INDEX: 26.84 KG/M2 | HEART RATE: 80 BPM | HEIGHT: 64 IN

## 2019-08-22 DIAGNOSIS — J01.90 ACUTE SINUSITIS, RECURRENCE NOT SPECIFIED, UNSPECIFIED LOCATION: Primary | ICD-10-CM

## 2019-08-22 DIAGNOSIS — I10 ESSENTIAL HYPERTENSION: Chronic | ICD-10-CM

## 2019-08-22 PROCEDURE — 99213 OFFICE O/P EST LOW 20 MIN: CPT | Performed by: FAMILY MEDICINE

## 2019-08-22 RX ORDER — LISINOPRIL 5 MG/1
5 TABLET ORAL DAILY
Qty: 30 TABLET | Refills: 0 | Status: SHIPPED | OUTPATIENT
Start: 2019-08-22 | End: 2019-09-23 | Stop reason: SDUPTHER

## 2019-08-22 RX ORDER — AMOXICILLIN AND CLAVULANATE POTASSIUM 500; 125 MG/1; MG/1
1 TABLET, FILM COATED ORAL 2 TIMES DAILY
Qty: 20 TABLET | Refills: 0 | Status: SHIPPED | OUTPATIENT
Start: 2019-08-22 | End: 2019-09-01

## 2019-08-22 RX ORDER — MELOXICAM 15 MG/1
15 TABLET ORAL DAILY
Qty: 90 TABLET | Refills: 1 | Status: SHIPPED | OUTPATIENT
Start: 2019-08-22 | End: 2020-02-25 | Stop reason: SDUPTHER

## 2019-08-22 ASSESSMENT — ENCOUNTER SYMPTOMS
SINUS PAIN: 1
SHORTNESS OF BREATH: 0
SINUS PRESSURE: 1
RESPIRATORY NEGATIVE: 1
ORTHOPNEA: 0
GASTROINTESTINAL NEGATIVE: 1

## 2019-08-22 NOTE — PROGRESS NOTES
[facility] PROFESSIONAL SERVS  [facility]  [address] [facility]  Dept: [phone]  Dept Fax: [phone]: [phone]    Visit Date: 8/22/2019    Arminda Arriaga is a 62 y.o.male who presents today for:   Chief Complaint   Patient presents with    Check-Up    Hyperlipidemia    Fall     Saturday- slipped on tile and lost consciousness. Having episodes of amnesia, tunneled vision, more migraines         HPI:      Vague history. Various complaints. He tends to drink. Fell 5 days ago and may have hit his head, but I am not clear as to events. He did not go and get checked. Complains of head congestion and headache. Dizziness for a week or more. I think he has been working. He also wants his check up and refills today. I am not sure compliance. Hypertension   This is a chronic problem. The current episode started more than 1 year ago. The problem has been resolved since onset. The problem is controlled. Pertinent negatives include no chest pain, orthopnea, palpitations or shortness of breath. Agents associated with hypertension include NSAIDs. Risk factors for coronary artery disease include male gender, smoking/tobacco exposure and family history. Past treatments include ACE inhibitors. The current treatment provides significant improvement. Compliance problems include psychosocial issues. There is no history of kidney disease, CAD/MI or CVA. There is no history of chronic renal disease or a thyroid problem.        Current Outpatient Medications   Medication Sig Dispense Refill    lisinopril (PRINIVIL;ZESTRIL) 5 MG tablet Take 1 tablet by mouth daily 30 tablet 0    meloxicam (MOBIC) 15 MG tablet Take 1 tablet by mouth daily 90 tablet 1    amoxicillin-clavulanate (AUGMENTIN) 500-125 MG per tablet Take 1 tablet by mouth 2 times daily for 10 days 20 tablet 0    esomeprazole (NEXIUM) 40 MG delayed release capsule Take 40 mg by

## 2019-09-23 DIAGNOSIS — I10 ESSENTIAL HYPERTENSION: Chronic | ICD-10-CM

## 2019-09-23 RX ORDER — LISINOPRIL 5 MG/1
5 TABLET ORAL DAILY
Qty: 90 TABLET | Refills: 1 | Status: SHIPPED | OUTPATIENT
Start: 2019-09-23 | End: 2020-02-25 | Stop reason: SDUPTHER

## 2020-01-25 ENCOUNTER — HOSPITAL ENCOUNTER (EMERGENCY)
Age: 60
Discharge: HOME | End: 2020-01-25
Payer: COMMERCIAL

## 2020-01-25 VITALS
TEMPERATURE: 97.7 F | HEART RATE: 67 BPM | RESPIRATION RATE: 18 BRPM | OXYGEN SATURATION: 96 % | DIASTOLIC BLOOD PRESSURE: 90 MMHG | SYSTOLIC BLOOD PRESSURE: 156 MMHG

## 2020-01-25 PROCEDURE — 99213 OFFICE O/P EST LOW 20 MIN: CPT | Performed by: NURSE PRACTITIONER

## 2020-01-25 PROCEDURE — 99212 OFFICE O/P EST SF 10 MIN: CPT

## 2020-01-25 RX ORDER — PREDNISONE 10 MG/1
TABLET ORAL
Qty: 14 TABLET | Refills: 0 | Status: SHIPPED | OUTPATIENT
Start: 2020-01-25 | End: 2020-02-25

## 2020-01-25 RX ORDER — AMOXICILLIN AND CLAVULANATE POTASSIUM 875; 125 MG/1; MG/1
1 TABLET, FILM COATED ORAL 2 TIMES DAILY
Qty: 20 TABLET | Refills: 0 | Status: SHIPPED | OUTPATIENT
Start: 2020-01-25 | End: 2020-02-04

## 2020-01-25 ASSESSMENT — ENCOUNTER SYMPTOMS
NAUSEA: 0
SINUS PAIN: 1
COUGH: 0
WHEEZING: 0
SHORTNESS OF BREATH: 0
CHEST TIGHTNESS: 0
STRIDOR: 0
SINUS PRESSURE: 1
VOMITING: 0
SORE THROAT: 0
DIARRHEA: 0

## 2020-01-25 ASSESSMENT — PAIN DESCRIPTION - DESCRIPTORS: DESCRIPTORS: SORE;TIGHTNESS

## 2020-01-25 ASSESSMENT — PAIN DESCRIPTION - LOCATION: LOCATION: CHEST

## 2020-01-25 ASSESSMENT — PAIN DESCRIPTION - PAIN TYPE: TYPE: ACUTE PAIN

## 2020-01-25 ASSESSMENT — PAIN DESCRIPTION - FREQUENCY: FREQUENCY: CONTINUOUS

## 2020-01-25 ASSESSMENT — PAIN SCALES - GENERAL: PAINLEVEL_OUTOF10: 3

## 2020-01-25 NOTE — ED NOTES
Patient discharge instructions given to pt and pt verbalized understanding, 2 px given, no other needs at this time, and pt left in stable condition.      Juan Brandt RN  01/25/20 2092

## 2020-01-25 NOTE — ED PROVIDER NOTES
[address]  Urgent Care Encounter       CHIEF COMPLAINT       Chief Complaint   Patient presents with    Cough     productive yellow phlegm    Nasal Congestion     onset a month ago, bright yellow drainage        Nurses Notes reviewed and I agree except as noted in the HPI. HISTORY OF PRESENT ILLNESS   Moe Nielson is a 62 y.o. male who presents     Patient states that for approximately 1 month he has had ongoing productive cough, as well as nasal drainage. He states that he was unable to be seen by his primary care provider this week. Denies any recent fevers or body aches. Patient does have a history of smoking. He has been trying over-the-counter decongestants this does not seem to be helpful. Sinusitis   Pain details:     Location:  Frontal    Quality:  Aching    Severity:  Mild    Duration:  1 month  Duration:  1 month  Chronicity:  New  Context: not allergies, not chemical odor, not deviated nasal septum, not recent URI and not smoke inhalation    Relieved by:  None tried  Worsened by:  Nothing  Ineffective treatments:  None tried  Associated symptoms: congestion and headaches    Associated symptoms: no chest pain, no chills, no cough, no ear pain, no fatigue, no fever, no nausea, no shortness of breath, no sore throat, no vomiting and no wheezing    Congestion:     Location:  Nasal    Interferes with sleep: no      Interferes with eating/drinking: no    Risk factors: no allergic reaction, no asthma, no BiPAP, no COPD, no CPAP use, no cystic fibrosis, no diabetes, no immune deficiency, no nasal cannula, no nasal polyps and no smoke exposure        REVIEW OF SYSTEMS     Review of Systems   Constitutional: Negative for chills, fatigue and fever. HENT: Positive for congestion, postnasal drip, sinus pressure and sinus pain. Negative for ear pain and sore throat. Respiratory: Negative for cough, chest tightness, shortness of breath, wheezing and stridor. a 40.00 pack-year smoking history. He has never used smokeless tobacco. He reports that he does not drink alcohol or use drugs. PHYSICAL EXAM     ED TRIAGE VITALS  BP: (!) 156/90, Temp: 97.7 °F (36.5 °C), Pulse: 67, Resp: 18, SpO2: 96 %,Estimated body mass index is 26.98 kg/m² as calculated from the following:    Height as of 8/22/19: 5' 4\" (1.626 m). Weight as of 8/22/19: 157 lb 3.2 oz (71.3 kg). ,No LMP for male patient. Physical Exam    DIAGNOSTIC RESULTS     Labs:No results found for this visit on 01/25/20. IMAGING:    No orders to display     URGENT CARE COURSE:     Vitals:    01/25/20 1150   BP: (!) 156/90   Pulse: 67   Resp: 18   Temp: 97.7 °F (36.5 °C)   TempSrc: Temporal   SpO2: 96%       Medications - No data to display         PROCEDURES:  None    FINAL IMPRESSION      1. Acute sinusitis, recurrence not specified, unspecified location    2. Acute upper respiratory infection          DISPOSITION/ PLAN   Patient is discharged home with prescription for Augmentin as well as prednisone tapering dose. Discussed with patient that most upper respiratory functions are viral in nature and can take up to 1 week to resolve, if symptoms do not seem to be improving in that time, there would be increasing suspicion for bacterial involvement. Patient is also informed that smoking does damage the airways, therefore causing difficulty in overcoming any respiratory infection. Recommend follow-up with primary care provider within the next 4 days if symptoms do not seem to be improving.         PATIENT REFERRED TO:  Mynor David MD  [place] [address] / [facility] [id number]      DISCHARGE MEDICATIONS:  Discharge Medication List as of 1/25/2020 12:24 PM      START taking these medications    Details   amoxicillin-clavulanate (AUGMENTIN) 875-125 MG per tablet Take 1 tablet by mouth 2 times daily for 10 days, Disp-20 tablet, R-0Print      predniSONE (DELTASONE) 10 MG tablet 40 mg for days 1 & 2, 20 mg for

## 2020-02-25 ENCOUNTER — OFFICE VISIT (OUTPATIENT)
Dept: FAMILY MEDICINE CLINIC | Age: 60
End: 2020-02-25
Payer: COMMERCIAL

## 2020-02-25 ENCOUNTER — HOSPITAL ENCOUNTER (OUTPATIENT)
Age: 60
Discharge: HOME | End: 2020-02-25
Payer: COMMERCIAL

## 2020-02-25 VITALS
HEIGHT: 64 IN | SYSTOLIC BLOOD PRESSURE: 138 MMHG | WEIGHT: 152 LBS | HEART RATE: 80 BPM | DIASTOLIC BLOOD PRESSURE: 88 MMHG | BODY MASS INDEX: 25.95 KG/M2

## 2020-02-25 PROCEDURE — 87086 URINE CULTURE/COLONY COUNT: CPT

## 2020-02-25 PROCEDURE — 99213 OFFICE O/P EST LOW 20 MIN: CPT | Performed by: FAMILY MEDICINE

## 2020-02-25 RX ORDER — LISINOPRIL 5 MG/1
5 TABLET ORAL DAILY
Qty: 90 TABLET | Refills: 3 | Status: SHIPPED | OUTPATIENT
Start: 2020-02-25 | End: 2021-03-02 | Stop reason: SDUPTHER

## 2020-02-25 RX ORDER — CIPROFLOXACIN 500 MG/1
500 TABLET, FILM COATED ORAL 2 TIMES DAILY
Qty: 20 TABLET | Refills: 0 | Status: SHIPPED | OUTPATIENT
Start: 2020-02-25 | End: 2020-03-06

## 2020-02-25 RX ORDER — MELOXICAM 15 MG/1
15 TABLET ORAL DAILY
Qty: 90 TABLET | Refills: 3 | Status: SHIPPED | OUTPATIENT
Start: 2020-02-25 | End: 2021-03-05 | Stop reason: SDUPTHER

## 2020-02-25 ASSESSMENT — PATIENT HEALTH QUESTIONNAIRE - PHQ9
SUM OF ALL RESPONSES TO PHQ QUESTIONS 1-9: 0
SUM OF ALL RESPONSES TO PHQ QUESTIONS 1-9: 0
1. LITTLE INTEREST OR PLEASURE IN DOING THINGS: 0
2. FEELING DOWN, DEPRESSED OR HOPELESS: 0
SUM OF ALL RESPONSES TO PHQ9 QUESTIONS 1 & 2: 0

## 2020-02-26 ASSESSMENT — ENCOUNTER SYMPTOMS
RESPIRATORY NEGATIVE: 1
ORTHOPNEA: 0
GASTROINTESTINAL NEGATIVE: 1

## 2020-02-26 NOTE — PROGRESS NOTES
[facility]  [address] [facility]  Dept: [phone]  Dept Fax: [phone]  Loc: [phone]    Visit Date: 2/26/2020    Kaylin Potter is a 62 y.o.male who presents today for:   Chief Complaint   Patient presents with    6 Month Follow-Up    Hypertension    Gastroesophageal Reflux         HPI:      Having issues with left wrist and needs more surgery. Recently (2 weeks) having dysuria and frequency, but no nocturia. No fever or gross hematuria. Pain with bladder contraction. FHx positive for urologic cancers. Hypertension   This is a chronic problem. The current episode started more than 1 year ago. The problem has been resolved since onset. The problem is controlled. Pertinent negatives include no chest pain, orthopnea or palpitations. Agents associated with hypertension include NSAIDs. Risk factors for coronary artery disease include male gender and family history. Past treatments include ACE inhibitors. The current treatment provides significant improvement. There are no compliance problems. Gastroesophageal Reflux   He reports no chest pain. Current Outpatient Medications   Medication Sig Dispense Refill    lisinopril (PRINIVIL;ZESTRIL) 5 MG tablet Take 1 tablet by mouth daily 90 tablet 3    meloxicam (MOBIC) 15 MG tablet Take 1 tablet by mouth daily 90 tablet 3    ciprofloxacin (CIPRO) 500 MG tablet Take 1 tablet by mouth 2 times daily for 10 days 20 tablet 0    esomeprazole (NEXIUM) 40 MG delayed release capsule Take 40 mg by mouth every morning (before breakfast)      ibuprofen (ADVIL;MOTRIN) 200 MG tablet Take 600 mg by mouth every 6 hours as needed for Pain        No current facility-administered medications for this visit. The patient is allergic to sulfa antibiotics. Subjective:      Review of Systems   Constitutional: Negative.   Negative for activity change and appetite

## 2020-02-27 ENCOUNTER — TELEPHONE (OUTPATIENT)
Dept: FAMILY MEDICINE CLINIC | Age: 60
End: 2020-02-27

## 2020-02-27 LAB — URINE CULTURE, ROUTINE: NORMAL

## 2020-02-27 NOTE — TELEPHONE ENCOUNTER
Spoke with [facility]- result given. Would like to proceed with Pelvic US. [facility]

## 2020-02-27 NOTE — TELEPHONE ENCOUNTER
----- Message from Valerie Dorantes MD sent at 2/27/2020 10:36 AM EST -----  Culture is negative. We should get pelvic ultrasound.

## 2020-03-02 ENCOUNTER — HOSPITAL ENCOUNTER (OUTPATIENT)
Dept: ULTRASOUND IMAGING | Age: 60
Discharge: HOME | End: 2020-03-02
Payer: COMMERCIAL

## 2020-03-02 PROCEDURE — 76856 US EXAM PELVIC COMPLETE: CPT

## 2020-03-03 ENCOUNTER — TELEPHONE (OUTPATIENT)
Dept: FAMILY MEDICINE CLINIC | Age: 60
End: 2020-03-03

## 2020-03-03 ENCOUNTER — HOSPITAL ENCOUNTER (OUTPATIENT)
Age: 60
Discharge: HOME | End: 2020-03-03
Payer: COMMERCIAL

## 2020-03-03 LAB
ANION GAP SERPL CALCULATED.3IONS-SCNC: 15 MEQ/L (ref 8–16)
BASOPHILS # BLD: 0.6 %
BASOPHILS ABSOLUTE: 0 THOU/MM3 (ref 0–0.1)
BUN BLDV-MCNC: 25 MG/DL (ref 7–22)
CALCIUM SERPL-MCNC: 9.2 MG/DL (ref 8.5–10.5)
CHLORIDE BLD-SCNC: 106 MEQ/L (ref 98–111)
CO2: 21 MEQ/L (ref 23–33)
CREAT SERPL-MCNC: 1 MG/DL (ref 0.4–1.2)
EKG ATRIAL RATE: 84 BPM
EKG P AXIS: 38 DEGREES
EKG P-R INTERVAL: 174 MS
EKG Q-T INTERVAL: 370 MS
EKG QRS DURATION: 94 MS
EKG QTC CALCULATION (BAZETT): 437 MS
EKG R AXIS: 48 DEGREES
EKG T AXIS: 40 DEGREES
EKG VENTRICULAR RATE: 84 BPM
EOSINOPHIL # BLD: 2.9 %
EOSINOPHILS ABSOLUTE: 0.2 THOU/MM3 (ref 0–0.4)
ERYTHROCYTE [DISTWIDTH] IN BLOOD BY AUTOMATED COUNT: 14.4 % (ref 11.5–14.5)
ERYTHROCYTE [DISTWIDTH] IN BLOOD BY AUTOMATED COUNT: 46.5 FL (ref 35–45)
GFR SERPL CREATININE-BSD FRML MDRD: 76 ML/MIN/1.73M2
GLUCOSE BLD-MCNC: 103 MG/DL (ref 70–108)
HCT VFR BLD CALC: 43.2 % (ref 42–52)
HEMOGLOBIN: 13.8 GM/DL (ref 14–18)
IMMATURE GRANS (ABS): 0.03 THOU/MM3 (ref 0–0.07)
IMMATURE GRANULOCYTES: 0.5 %
LYMPHOCYTES # BLD: 35 %
LYMPHOCYTES ABSOLUTE: 2.3 THOU/MM3 (ref 1–4.8)
MCH RBC QN AUTO: 28.5 PG (ref 26–33)
MCHC RBC AUTO-ENTMCNC: 31.9 GM/DL (ref 32.2–35.5)
MCV RBC AUTO: 89.1 FL (ref 80–94)
MONOCYTES # BLD: 9.5 %
MONOCYTES ABSOLUTE: 0.6 THOU/MM3 (ref 0.4–1.3)
NUCLEATED RED BLOOD CELLS: 0 /100 WBC
PLATELET # BLD: 258 THOU/MM3 (ref 130–400)
PMV BLD AUTO: 10.7 FL (ref 9.4–12.4)
POTASSIUM SERPL-SCNC: 3.9 MEQ/L (ref 3.5–5.2)
RBC # BLD: 4.85 MILL/MM3 (ref 4.7–6.1)
SEG NEUTROPHILS: 51.5 %
SEGMENTED NEUTROPHILS ABSOLUTE COUNT: 3.5 THOU/MM3 (ref 1.8–7.7)
SODIUM BLD-SCNC: 142 MEQ/L (ref 135–145)
WBC # BLD: 6.7 THOU/MM3 (ref 4.8–10.8)

## 2020-03-03 PROCEDURE — 93010 ELECTROCARDIOGRAM REPORT: CPT | Performed by: NUCLEAR MEDICINE

## 2020-03-03 PROCEDURE — 93005 ELECTROCARDIOGRAM TRACING: CPT | Performed by: PHYSICIAN ASSISTANT

## 2020-03-03 PROCEDURE — 85025 COMPLETE CBC W/AUTO DIFF WBC: CPT

## 2020-03-03 PROCEDURE — 80048 BASIC METABOLIC PNL TOTAL CA: CPT

## 2020-03-03 PROCEDURE — 36415 COLL VENOUS BLD VENIPUNCTURE: CPT

## 2020-03-03 NOTE — TELEPHONE ENCOUNTER
Spoke with patient- agreeable for Urology referral. Is going to check to see who is in network and call us back with who he wishes to schedule with.

## 2020-03-04 ENCOUNTER — TELEPHONE (OUTPATIENT)
Dept: FAMILY MEDICINE CLINIC | Age: 60
End: 2020-03-04

## 2020-03-09 ENCOUNTER — OFFICE VISIT (OUTPATIENT)
Dept: FAMILY MEDICINE CLINIC | Age: 60
End: 2020-03-09
Payer: COMMERCIAL

## 2020-03-09 VITALS
SYSTOLIC BLOOD PRESSURE: 132 MMHG | DIASTOLIC BLOOD PRESSURE: 80 MMHG | HEIGHT: 64 IN | BODY MASS INDEX: 26.12 KG/M2 | HEART RATE: 84 BPM | WEIGHT: 153 LBS

## 2020-03-09 PROBLEM — R33.9 URINARY RETENTION: Chronic | Status: ACTIVE | Noted: 2020-03-09

## 2020-03-09 PROCEDURE — 99243 OFF/OP CNSLTJ NEW/EST LOW 30: CPT | Performed by: FAMILY MEDICINE

## 2020-03-09 SDOH — ECONOMIC STABILITY: FOOD INSECURITY: WITHIN THE PAST 12 MONTHS, THE FOOD YOU BOUGHT JUST DIDN'T LAST AND YOU DIDN'T HAVE MONEY TO GET MORE.: NEVER TRUE

## 2020-03-09 SDOH — ECONOMIC STABILITY: TRANSPORTATION INSECURITY
IN THE PAST 12 MONTHS, HAS THE LACK OF TRANSPORTATION KEPT YOU FROM MEDICAL APPOINTMENTS OR FROM GETTING MEDICATIONS?: NO

## 2020-03-09 SDOH — ECONOMIC STABILITY: TRANSPORTATION INSECURITY
IN THE PAST 12 MONTHS, HAS LACK OF TRANSPORTATION KEPT YOU FROM MEETINGS, WORK, OR FROM GETTING THINGS NEEDED FOR DAILY LIVING?: NO

## 2020-03-09 SDOH — ECONOMIC STABILITY: INCOME INSECURITY: HOW HARD IS IT FOR YOU TO PAY FOR THE VERY BASICS LIKE FOOD, HOUSING, MEDICAL CARE, AND HEATING?: SOMEWHAT HARD

## 2020-03-09 SDOH — ECONOMIC STABILITY: FOOD INSECURITY: WITHIN THE PAST 12 MONTHS, YOU WORRIED THAT YOUR FOOD WOULD RUN OUT BEFORE YOU GOT MONEY TO BUY MORE.: NEVER TRUE

## 2020-03-09 ASSESSMENT — ENCOUNTER SYMPTOMS
SHORTNESS OF BREATH: 0
RESPIRATORY NEGATIVE: 1
ORTHOPNEA: 0
GASTROINTESTINAL NEGATIVE: 1
ABDOMINAL PAIN: 0

## 2020-03-09 NOTE — PROGRESS NOTES
Wt 153 lb (69.4 kg)   BMI 26.26 kg/m²     EKG and labs OK. Assessment:          Diagnosis Orders   1. Pre-operative clearance     2. Urinary retention         Plan:    Cleared for surgery. No follow-ups on file. Orders Placed:  No orders of the defined types were placed in this encounter. Medications Prescribed:  No orders of the defined types were placed in this encounter.       Electronically signed by Damon Brenner MD on 3/9/2020 at 2:15 PM

## 2020-03-23 ENCOUNTER — TELEPHONE (OUTPATIENT)
Dept: FAMILY MEDICINE CLINIC | Age: 60
End: 2020-03-23

## 2020-03-23 RX ORDER — CEFADROXIL 500 MG/1
500 CAPSULE ORAL 2 TIMES DAILY
Qty: 20 CAPSULE | Refills: 0 | Status: SHIPPED | OUTPATIENT
Start: 2020-03-23 | End: 2020-04-02

## 2020-03-23 NOTE — TELEPHONE ENCOUNTER
Spoke with patient he has been using Abreva and he said it looks like it is spreading.  He uses [facility]

## 2020-04-15 ENCOUNTER — HOSPITAL ENCOUNTER (EMERGENCY)
Age: 60
Discharge: HOME | End: 2020-04-15
Payer: COMMERCIAL

## 2020-04-15 VITALS
HEART RATE: 75 BPM | DIASTOLIC BLOOD PRESSURE: 82 MMHG | SYSTOLIC BLOOD PRESSURE: 136 MMHG | HEIGHT: 64 IN | RESPIRATION RATE: 18 BRPM | WEIGHT: 155 LBS | BODY MASS INDEX: 26.46 KG/M2 | TEMPERATURE: 97.5 F | OXYGEN SATURATION: 96 %

## 2020-04-15 PROCEDURE — 99213 OFFICE O/P EST LOW 20 MIN: CPT

## 2020-04-15 PROCEDURE — 96372 THER/PROPH/DIAG INJ SC/IM: CPT

## 2020-04-15 PROCEDURE — 6360000002 HC RX W HCPCS: Performed by: NURSE PRACTITIONER

## 2020-04-15 PROCEDURE — 99213 OFFICE O/P EST LOW 20 MIN: CPT | Performed by: NURSE PRACTITIONER

## 2020-04-15 RX ORDER — KETOROLAC TROMETHAMINE 30 MG/ML
30 INJECTION, SOLUTION INTRAMUSCULAR; INTRAVENOUS ONCE
Status: COMPLETED | OUTPATIENT
Start: 2020-04-15 | End: 2020-04-15

## 2020-04-15 RX ADMIN — KETOROLAC TROMETHAMINE 30 MG: 30 INJECTION, SOLUTION INTRAMUSCULAR at 17:15

## 2020-04-15 ASSESSMENT — PAIN DESCRIPTION - FREQUENCY: FREQUENCY: CONTINUOUS

## 2020-04-15 ASSESSMENT — ENCOUNTER SYMPTOMS
DIARRHEA: 0
NAUSEA: 0
SHORTNESS OF BREATH: 0
VOMITING: 0
COUGH: 0
SINUS PRESSURE: 0
CONSTIPATION: 0
ABDOMINAL PAIN: 0

## 2020-04-15 ASSESSMENT — PAIN DESCRIPTION - PROGRESSION: CLINICAL_PROGRESSION: GRADUALLY WORSENING

## 2020-04-15 ASSESSMENT — PAIN DESCRIPTION - ONSET: ONSET: GRADUAL

## 2020-04-15 ASSESSMENT — PAIN SCALES - GENERAL
PAINLEVEL_OUTOF10: 7
PAINLEVEL_OUTOF10: 7
PAINLEVEL_OUTOF10: 6

## 2020-04-15 ASSESSMENT — PAIN DESCRIPTION - DESCRIPTORS: DESCRIPTORS: SHARP

## 2020-04-15 ASSESSMENT — PAIN DESCRIPTION - LOCATION: LOCATION: GROIN;HIP

## 2020-04-15 ASSESSMENT — PAIN DESCRIPTION - PAIN TYPE: TYPE: ACUTE PAIN

## 2020-04-15 ASSESSMENT — PAIN DESCRIPTION - ORIENTATION: ORIENTATION: LEFT;ANTERIOR

## 2020-04-15 ASSESSMENT — PAIN - FUNCTIONAL ASSESSMENT: PAIN_FUNCTIONAL_ASSESSMENT: PREVENTS OR INTERFERES SOME ACTIVE ACTIVITIES AND ADLS

## 2020-04-15 NOTE — ED PROVIDER NOTES
[address]  Urgent Care Encounter       CHIEF COMPLAINT       Chief Complaint   Patient presents with    Hip Pain     left anterior hip and groin pain radiating down left leg    Groin Pain       Nurses Notes reviewed and I agree except as noted in the HPI. HISTORY OF PRESENT ILLNESS   Ludivina Lopes is a 62 y.o. male who presents with complaints of left groin pain, onset 1 week ago. Patient states he had a very mild pain in the right groin area 1 week ago but over the last 2 days the pain has become significantly worse. He states he has pain that shoots down his anterior thigh to the level of the knee as well as some posterior hip pain. Patient's states he took an oxycodone pill last night due to pain rated a 7 out of 10. Oxycodone he was left over from recent arm surgery. Pain is currently 5 out of 10. No reports of nausea, vomiting, diarrhea, constipation or abdominal pain. Patient also reports difficulty walking due to the pain. He does report a history of bilateral inguinal hernias for years ago in which of the right side was repaired. The left side was small and not repaired. The history is provided by the patient. REVIEW OF SYSTEMS     Review of Systems   Constitutional: Negative for chills and fever. HENT: Negative for congestion and sinus pressure. Respiratory: Negative for cough and shortness of breath. Cardiovascular: Negative for chest pain. Gastrointestinal: Negative for abdominal pain, constipation, diarrhea, nausea and vomiting. Left anterior groin pain   Genitourinary: Negative for difficulty urinating and testicular pain. Skin: Negative for rash. Neurological: Negative for headaches.        PAST MEDICAL HISTORY         Diagnosis Date    Chronic back pain     Essential hypertension 4/1/2016    GERD (gastroesophageal reflux disease)     Osteoarthritis     Stomach ulcer        SURGICALHISTORY     Patient  has a past surgical history ill-appearing. Eyes:      General: Lids are normal. No scleral icterus. Conjunctiva/sclera: Conjunctivae normal.      Pupils: Pupils are equal.   Cardiovascular:      Rate and Rhythm: Normal rate and regular rhythm. Heart sounds: Normal heart sounds, S1 normal and S2 normal.   Pulmonary:      Effort: Pulmonary effort is normal. No respiratory distress. Breath sounds: Normal breath sounds. Abdominal:      General: Abdomen is flat. Bowel sounds are normal. There is no distension. Palpations: Abdomen is soft. Hernia: No hernia is present. There is no hernia in the left inguinal area (significant tenderness. no pulsitile mass). Musculoskeletal:      Comments: Normal active ROM x 4 extremities  Gait steady   Lymphadenopathy:      Lower Body: No left inguinal adenopathy. Skin:     General: Skin is warm and dry. Findings: No rash (to exposed skin). Nails: There is no clubbing. Neurological:      General: No focal deficit present. Mental Status: He is alert and oriented to person, place, and time. Sensory: No sensory deficit. Comments: Answers questions readily and appropriately   Psychiatric:         Mood and Affect: Mood normal.         Speech: Speech normal.         Behavior: Behavior normal. Behavior is cooperative. DIAGNOSTIC RESULTS     Labs:No results found for this visit on 04/15/20. IMAGING:    No orders to display         EKG:      URGENT CARE COURSE:     Vitals:    04/15/20 1642 04/15/20 1737   BP: (!) 147/97 136/82   Pulse: 84 75   Resp: 18 18   Temp: 97.5 °F (36.4 °C)    TempSrc: Temporal    SpO2: 96%    Weight: 155 lb (70.3 kg)    Height: 5' 4\" (1.626 m)        Medications   ketorolac (TORADOL) injection 30 mg (30 mg Intramuscular Given 4/15/20 7408)            PROCEDURES:  None    FINAL IMPRESSION      1.  Left groin pain          DISPOSITION/ PLAN     Patient presents with left anterior groin pain patient does report history of a small

## 2020-04-16 ENCOUNTER — TELEPHONE (OUTPATIENT)
Dept: FAMILY MEDICINE CLINIC | Age: 60
End: 2020-04-16

## 2020-04-22 ENCOUNTER — OFFICE VISIT (OUTPATIENT)
Dept: SURGERY | Age: 60
End: 2020-04-22
Payer: COMMERCIAL

## 2020-04-22 ENCOUNTER — PREP FOR PROCEDURE (OUTPATIENT)
Dept: SURGERY | Age: 60
End: 2020-04-22

## 2020-04-22 VITALS
WEIGHT: 156 LBS | DIASTOLIC BLOOD PRESSURE: 92 MMHG | RESPIRATION RATE: 20 BRPM | TEMPERATURE: 97.5 F | BODY MASS INDEX: 26.63 KG/M2 | HEIGHT: 64 IN | HEART RATE: 86 BPM | OXYGEN SATURATION: 99 % | SYSTOLIC BLOOD PRESSURE: 144 MMHG

## 2020-04-22 PROCEDURE — 99243 OFF/OP CNSLTJ NEW/EST LOW 30: CPT | Performed by: SURGERY

## 2020-04-22 RX ORDER — OXYCODONE HYDROCHLORIDE AND ACETAMINOPHEN 5; 325 MG/1; MG/1
1 TABLET ORAL EVERY 6 HOURS PRN
COMMUNITY
Start: 2020-03-12 | End: 2020-05-11 | Stop reason: ALTCHOICE

## 2020-04-22 RX ORDER — SODIUM CHLORIDE 9 MG/ML
INJECTION, SOLUTION INTRAVENOUS CONTINUOUS
Status: CANCELLED | OUTPATIENT
Start: 2020-04-22

## 2020-04-23 ASSESSMENT — ENCOUNTER SYMPTOMS
VOICE CHANGE: 0
EYE REDNESS: 0
COLOR CHANGE: 0
CONSTIPATION: 0
EYE DISCHARGE: 0
SORE THROAT: 0
STRIDOR: 0
RHINORRHEA: 0
CHEST TIGHTNESS: 0
COUGH: 0
DIARRHEA: 0
BACK PAIN: 1
ALLERGIC/IMMUNOLOGIC NEGATIVE: 1
APNEA: 0
ABDOMINAL PAIN: 0
EYE ITCHING: 0
SINUS PRESSURE: 0
ANAL BLEEDING: 0
RECTAL PAIN: 0
PHOTOPHOBIA: 0
WHEEZING: 0
BLOOD IN STOOL: 0
EYE PAIN: 0
TROUBLE SWALLOWING: 0
VOMITING: 0
FACIAL SWELLING: 0
CHOKING: 0
NAUSEA: 0
ABDOMINAL DISTENTION: 0
SHORTNESS OF BREATH: 0

## 2020-04-23 NOTE — PROGRESS NOTES
Subjective:      Patient ID: Chacho Yo is a 56 y.o. male. Chief Complaint   Patient presents with    Surgical Consult     Est patient-s/p repair of left indirect reducible hernia with mesh-1/3/2012-went to [facility] 4/15/20 for left groin pain radiates down leg-no imaging done     HPI  Reggie Tyler is a 57-year-old male who presents secondary to left groin bulge with discomfort. He underwent a left inguinal hernia repair back in January 2012. He denies having any issues in his left groin region until about a month ago. He denies lifting anything heavy but admits to recently doing a lot of coughing. Has noticed left groin discomfort. Radiates down the medial aspect of his leg to his knee. Worse with certain positions and movement. Sometimes worse with walking. Occasionally has to use a cane due to the discomfort. Discomfort is sharp. Noticed a small slight protrusion when he went to [facility]. [place] to have a recurrent left inguinal hernia. Denies any issues in the right groin. No generalized abdominal pain. Denies any significant previous abdominal surgeries. No new urinary complaints. Normal bowel function. Tolerating diet. No hematochezia or melena. Was felt to have a known small right inguinal hernia several years ago but denies it ever bothering him much. Review of Systems   Constitutional: Negative for activity change, appetite change, chills, diaphoresis, fatigue, fever and unexpected weight change. HENT: Positive for hearing loss. Negative for congestion, dental problem, drooling, ear discharge, ear pain, facial swelling, mouth sores, nosebleeds, postnasal drip, rhinorrhea, sinus pressure, sneezing, sore throat, tinnitus, trouble swallowing and voice change. Eyes: Negative for photophobia, pain, discharge, redness, itching and visual disturbance. Respiratory: Negative for apnea, cough, choking, chest tightness, shortness of breath, wheezing and stridor.     Cardiovascular: Negative for needed.  lisinopril (PRINIVIL;ZESTRIL) 5 MG tablet Take 1 tablet by mouth daily 90 tablet 3    meloxicam (MOBIC) 15 MG tablet Take 1 tablet by mouth daily 90 tablet 3    ibuprofen (ADVIL;MOTRIN) 200 MG tablet Take 600 mg by mouth every 6 hours as needed for Pain        No current facility-administered medications for this visit.         Allergies   Allergen Reactions    Sulfa Antibiotics Hives       Family History   Problem Relation Age of Onset    Arthritis Mother     Heart Disease Mother     High Blood Pressure Mother     Arthritis Father     Cancer Father         prostate    Diabetes Father     Heart Disease Father     High Blood Pressure Father     Colon Cancer Father 80    Heart Disease Sister     High Blood Pressure Sister     Learning Disabilities Sister     Learning Disabilities Brother     High Blood Pressure Brother     Heart Disease Brother     Arthritis Brother     Cancer Brother         prostate       Social History     Socioeconomic History    Marital status:      Spouse name: Jolly    Number of children: 9    Years of education: 5    Highest education level: GED or equivalent   Occupational History    Occupation: [employer]     Employer: [employer]   Social Needs    Financial resource strain: Somewhat hard    Food insecurity     Worry: Never true     Inability: Never true    Transportation needs     Medical: No     Non-medical: No   Tobacco Use    Smoking status: Current Every Day Smoker     Packs/day: 0.50     Years: 40.00     Pack years: 20.00     Types: Cigarettes    Smokeless tobacco: Never Used    Tobacco comment: trying to cut down   Substance and Sexual Activity    Alcohol use: No     Alcohol/week: 0.0 standard drinks     Comment: occasionally    Drug use: No    Sexual activity: Yes   Lifestyle    Physical activity     Days per week: Not on file     Minutes per session: Not on file    Stress: Not on file   Relationships    Social respiratory distress. Breath sounds: Normal breath sounds. No stridor. No decreased breath sounds, wheezing or rales. Chest:      Chest wall: No deformity or tenderness. Abdominal:      General: Bowel sounds are normal. There is no distension or abdominal bruit. Palpations: Abdomen is soft. Abdomen is not rigid. There is no fluid wave, mass or pulsatile mass. Tenderness: There is no abdominal tenderness. There is no guarding or rebound. Hernia: A hernia is present. Hernia is present in the right inguinal area and left inguinal area. There is no hernia in the ventral area. Musculoskeletal: Normal range of motion. General: No tenderness. Lymphadenopathy:      Cervical: No cervical adenopathy. Upper Body:      Right upper body: No supraclavicular adenopathy. Left upper body: No supraclavicular adenopathy. Skin:     General: Skin is warm and dry. Coloration: Skin is not pale. Findings: No abrasion, bruising, burn, erythema, laceration or rash. Nails: There is no clubbing. Neurological:      Mental Status: He is alert and oriented to person, place, and time. Cranial Nerves: No cranial nerve deficit. Sensory: No sensory deficit. Gait: Gait normal.   Psychiatric:         Speech: Speech normal.         Behavior: Behavior normal. Behavior is cooperative. Thought Content:  Thought content normal.         Judgment: Judgment normal.       Lab Results   Component Value Date     03/03/2020    K 3.9 03/03/2020     03/03/2020    CO2 21 (L) 03/03/2020     Lab Results   Component Value Date    CREATININE 1.0 03/03/2020     Lab Results   Component Value Date    WBC 6.7 03/03/2020    HGB 13.8 (L) 03/03/2020    HCT 43.2 03/03/2020    MCV 89.1 03/03/2020     03/03/2020     Lab Results   Component Value Date    ALT 27 04/12/2018    AST 20 04/12/2018    ALKPHOS 66 04/12/2018    BILITOT 0.2 (L) 04/12/2018     Imaging - none    Patient Active Problem List   Diagnosis    Bilateral inguinal hernia    Tobacco use disorder    Essential hypertension---hypertensive stress test.    Urinary retention     Assessment:      1. Symptomatic recurrent left inguinal hernia  2. Right inguinal hernia      Plan:      1. Schedule [facility] for robotic repair recurrent left inguinal hernia with mesh; robotic repair right inguinal hernia with mesh. 2. He will undergo pre-operative clearance per anesthesia guidelines with risk factors listed under the past medical history diagnosis & problem list.  3. The risks, benefits and alternatives were discussed with [facility] Board including non-operative management. The pros and cons of robotic, laparoscopic and open techniques were discussed. The pros and cons of mesh insertion were discussed. All questions answered. He understands and wishes to proceed with surgical intervention. 4. Restrictions discussed with [facility] Board and he expresses understanding. 5. He is advised to call back directly if there are further questions/concerns, or if his symptoms worsen prior to surgery.         Lionel Mccrary MD

## 2020-04-27 NOTE — PROGRESS NOTES
Patient contacted regarding COVID-19 screen. Following questions asked: In the last month, have you been in contact with someone who was confirmed or suspected to have Coronavirus/COVID-19:  Patient stated NO    Do you or family members have any of the following symptoms:  Cough-no   Muscle pain-no   Shortness of breath-no   Fever-no   Weakness-no  Severe headache-no   Sore throat-no   Respiratory symptoms-no    Have you traveled internationally in the last month?  No     Have you been to the emergency room recently-no

## 2020-04-28 ENCOUNTER — ANESTHESIA (OUTPATIENT)
Dept: OPERATING ROOM | Age: 60
End: 2020-04-28
Payer: COMMERCIAL

## 2020-04-28 ENCOUNTER — HOSPITAL ENCOUNTER (OUTPATIENT)
Age: 60
Setting detail: OUTPATIENT SURGERY
Discharge: HOME | End: 2020-04-28
Attending: SURGERY | Admitting: SURGERY
Payer: COMMERCIAL

## 2020-04-28 ENCOUNTER — ANESTHESIA EVENT (OUTPATIENT)
Dept: OPERATING ROOM | Age: 60
End: 2020-04-28
Payer: COMMERCIAL

## 2020-04-28 VITALS — OXYGEN SATURATION: 96 % | SYSTOLIC BLOOD PRESSURE: 116 MMHG | TEMPERATURE: 97 F | DIASTOLIC BLOOD PRESSURE: 71 MMHG

## 2020-04-28 VITALS
OXYGEN SATURATION: 95 % | DIASTOLIC BLOOD PRESSURE: 67 MMHG | TEMPERATURE: 97.2 F | SYSTOLIC BLOOD PRESSURE: 111 MMHG | HEART RATE: 77 BPM | RESPIRATION RATE: 18 BRPM

## 2020-04-28 LAB — POTASSIUM SERPL-SCNC: 4.6 MEQ/L (ref 3.5–5.2)

## 2020-04-28 PROCEDURE — 2709999900 HC NON-CHARGEABLE SUPPLY: Performed by: SURGERY

## 2020-04-28 PROCEDURE — 6370000000 HC RX 637 (ALT 250 FOR IP): Performed by: SURGERY

## 2020-04-28 PROCEDURE — 3700000000 HC ANESTHESIA ATTENDED CARE: Performed by: SURGERY

## 2020-04-28 PROCEDURE — 7100000010 HC PHASE II RECOVERY - FIRST 15 MIN: Performed by: SURGERY

## 2020-04-28 PROCEDURE — 2500000003 HC RX 250 WO HCPCS: Performed by: SURGERY

## 2020-04-28 PROCEDURE — 36415 COLL VENOUS BLD VENIPUNCTURE: CPT

## 2020-04-28 PROCEDURE — 6360000002 HC RX W HCPCS

## 2020-04-28 PROCEDURE — 2500000003 HC RX 250 WO HCPCS: Performed by: REGISTERED NURSE

## 2020-04-28 PROCEDURE — 49651 LAP ING HERNIA REPAIR RECUR: CPT | Performed by: SURGERY

## 2020-04-28 PROCEDURE — 3600000009 HC SURGERY ROBOT BASE: Performed by: SURGERY

## 2020-04-28 PROCEDURE — C1781 MESH (IMPLANTABLE): HCPCS | Performed by: SURGERY

## 2020-04-28 PROCEDURE — 7100000000 HC PACU RECOVERY - FIRST 15 MIN: Performed by: SURGERY

## 2020-04-28 PROCEDURE — 7100000001 HC PACU RECOVERY - ADDTL 15 MIN: Performed by: SURGERY

## 2020-04-28 PROCEDURE — 6360000002 HC RX W HCPCS: Performed by: REGISTERED NURSE

## 2020-04-28 PROCEDURE — S2900 ROBOTIC SURGICAL SYSTEM: HCPCS | Performed by: SURGERY

## 2020-04-28 PROCEDURE — 84132 ASSAY OF SERUM POTASSIUM: CPT

## 2020-04-28 PROCEDURE — 7100000011 HC PHASE II RECOVERY - ADDTL 15 MIN: Performed by: SURGERY

## 2020-04-28 PROCEDURE — 2580000003 HC RX 258

## 2020-04-28 PROCEDURE — 3700000001 HC ADD 15 MINUTES (ANESTHESIA): Performed by: SURGERY

## 2020-04-28 PROCEDURE — 3600000019 HC SURGERY ROBOT ADDTL 15MIN: Performed by: SURGERY

## 2020-04-28 DEVICE — MESH HERN L W10.8XL16CM L INGUINAL WHT POLYPR MFIL: Type: IMPLANTABLE DEVICE | Site: GROIN | Status: FUNCTIONAL

## 2020-04-28 DEVICE — MESH HERN L W10.8XL16CM R INGUINAL WHT POLYPR MFIL: Type: IMPLANTABLE DEVICE | Site: GROIN | Status: FUNCTIONAL

## 2020-04-28 RX ORDER — SUCCINYLCHOLINE/SOD CL,ISO/PF 200MG/10ML
SYRINGE (ML) INTRAVENOUS PRN
Status: DISCONTINUED | OUTPATIENT
Start: 2020-04-28 | End: 2020-04-28 | Stop reason: SDUPTHER

## 2020-04-28 RX ORDER — KETOROLAC TROMETHAMINE 10 MG/1
10 TABLET, FILM COATED ORAL EVERY 8 HOURS PRN
Qty: 15 TABLET | Refills: 0 | Status: SHIPPED | OUTPATIENT
Start: 2020-04-28 | End: 2020-05-11 | Stop reason: ALTCHOICE

## 2020-04-28 RX ORDER — SODIUM CHLORIDE 9 MG/ML
INJECTION, SOLUTION INTRAVENOUS CONTINUOUS
Status: DISCONTINUED | OUTPATIENT
Start: 2020-04-28 | End: 2020-04-28 | Stop reason: HOSPADM

## 2020-04-28 RX ORDER — SODIUM CHLORIDE 0.9 % (FLUSH) 0.9 %
10 SYRINGE (ML) INJECTION EVERY 12 HOURS SCHEDULED
Status: DISCONTINUED | OUTPATIENT
Start: 2020-04-28 | End: 2020-04-28 | Stop reason: HOSPADM

## 2020-04-28 RX ORDER — GLYCOPYRROLATE 1 MG/5 ML
SYRINGE (ML) INTRAVENOUS PRN
Status: DISCONTINUED | OUTPATIENT
Start: 2020-04-28 | End: 2020-04-28 | Stop reason: SDUPTHER

## 2020-04-28 RX ORDER — OXYCODONE HYDROCHLORIDE 5 MG/1
5 TABLET ORAL EVERY 4 HOURS PRN
Status: DISCONTINUED | OUTPATIENT
Start: 2020-04-28 | End: 2020-04-28 | Stop reason: HOSPADM

## 2020-04-28 RX ORDER — HYDROCODONE BITARTRATE AND ACETAMINOPHEN 5; 325 MG/1; MG/1
1-2 TABLET ORAL EVERY 6 HOURS PRN
Qty: 20 TABLET | Refills: 0 | Status: SHIPPED | OUTPATIENT
Start: 2020-04-28 | End: 2020-05-01

## 2020-04-28 RX ORDER — KETAMINE HCL IN NACL, ISO-OSM 100MG/10ML
SYRINGE (ML) INJECTION PRN
Status: DISCONTINUED | OUTPATIENT
Start: 2020-04-28 | End: 2020-04-28 | Stop reason: SDUPTHER

## 2020-04-28 RX ORDER — METOCLOPRAMIDE HYDROCHLORIDE 5 MG/ML
INJECTION INTRAMUSCULAR; INTRAVENOUS PRN
Status: DISCONTINUED | OUTPATIENT
Start: 2020-04-28 | End: 2020-04-28 | Stop reason: SDUPTHER

## 2020-04-28 RX ORDER — ESMOLOL HYDROCHLORIDE 10 MG/ML
INJECTION INTRAVENOUS PRN
Status: DISCONTINUED | OUTPATIENT
Start: 2020-04-28 | End: 2020-04-28 | Stop reason: SDUPTHER

## 2020-04-28 RX ORDER — FENTANYL CITRATE 50 UG/ML
INJECTION, SOLUTION INTRAMUSCULAR; INTRAVENOUS PRN
Status: DISCONTINUED | OUTPATIENT
Start: 2020-04-28 | End: 2020-04-28 | Stop reason: SDUPTHER

## 2020-04-28 RX ORDER — HYDROMORPHONE HCL 110MG/55ML
PATIENT CONTROLLED ANALGESIA SYRINGE INTRAVENOUS PRN
Status: DISCONTINUED | OUTPATIENT
Start: 2020-04-28 | End: 2020-04-28 | Stop reason: SDUPTHER

## 2020-04-28 RX ORDER — ROCURONIUM BROMIDE 10 MG/ML
INJECTION, SOLUTION INTRAVENOUS PRN
Status: DISCONTINUED | OUTPATIENT
Start: 2020-04-28 | End: 2020-04-28 | Stop reason: SDUPTHER

## 2020-04-28 RX ORDER — SODIUM CHLORIDE 0.9 % (FLUSH) 0.9 %
10 SYRINGE (ML) INJECTION PRN
Status: DISCONTINUED | OUTPATIENT
Start: 2020-04-28 | End: 2020-04-28 | Stop reason: HOSPADM

## 2020-04-28 RX ORDER — MORPHINE SULFATE 2 MG/ML
2 INJECTION, SOLUTION INTRAMUSCULAR; INTRAVENOUS
Status: DISCONTINUED | OUTPATIENT
Start: 2020-04-28 | End: 2020-04-28 | Stop reason: HOSPADM

## 2020-04-28 RX ORDER — MORPHINE SULFATE 2 MG/ML
4 INJECTION, SOLUTION INTRAMUSCULAR; INTRAVENOUS
Status: DISCONTINUED | OUTPATIENT
Start: 2020-04-28 | End: 2020-04-28 | Stop reason: HOSPADM

## 2020-04-28 RX ORDER — ONDANSETRON 2 MG/ML
4 INJECTION INTRAMUSCULAR; INTRAVENOUS EVERY 6 HOURS PRN
Status: DISCONTINUED | OUTPATIENT
Start: 2020-04-28 | End: 2020-04-28 | Stop reason: HOSPADM

## 2020-04-28 RX ORDER — EPHEDRINE SULFATE 50 MG/ML
INJECTION, SOLUTION INTRAVENOUS PRN
Status: DISCONTINUED | OUTPATIENT
Start: 2020-04-28 | End: 2020-04-28 | Stop reason: SDUPTHER

## 2020-04-28 RX ORDER — EPHEDRINE SULFATE/0.9% NACL/PF 50 MG/5 ML
SYRINGE (ML) INTRAVENOUS PRN
Status: DISCONTINUED | OUTPATIENT
Start: 2020-04-28 | End: 2020-04-28 | Stop reason: SDUPTHER

## 2020-04-28 RX ORDER — BUPIVACAINE HYDROCHLORIDE 5 MG/ML
INJECTION, SOLUTION PERINEURAL PRN
Status: DISCONTINUED | OUTPATIENT
Start: 2020-04-28 | End: 2020-04-28 | Stop reason: ALTCHOICE

## 2020-04-28 RX ORDER — KETOROLAC TROMETHAMINE 30 MG/ML
INJECTION, SOLUTION INTRAMUSCULAR; INTRAVENOUS PRN
Status: DISCONTINUED | OUTPATIENT
Start: 2020-04-28 | End: 2020-04-28 | Stop reason: SDUPTHER

## 2020-04-28 RX ORDER — HYDRALAZINE HYDROCHLORIDE 20 MG/ML
INJECTION INTRAMUSCULAR; INTRAVENOUS PRN
Status: DISCONTINUED | OUTPATIENT
Start: 2020-04-28 | End: 2020-04-28 | Stop reason: SDUPTHER

## 2020-04-28 RX ORDER — CEFAZOLIN SODIUM 1 G/50ML
1 INJECTION, SOLUTION INTRAVENOUS
Status: COMPLETED | OUTPATIENT
Start: 2020-04-28 | End: 2020-04-28

## 2020-04-28 RX ORDER — OXYCODONE HYDROCHLORIDE 5 MG/1
10 TABLET ORAL EVERY 4 HOURS PRN
Status: DISCONTINUED | OUTPATIENT
Start: 2020-04-28 | End: 2020-04-28 | Stop reason: HOSPADM

## 2020-04-28 RX ORDER — FENTANYL CITRATE 50 UG/ML
INJECTION, SOLUTION INTRAMUSCULAR; INTRAVENOUS
Status: COMPLETED
Start: 2020-04-28 | End: 2020-04-28

## 2020-04-28 RX ORDER — PROMETHAZINE HYDROCHLORIDE 25 MG/1
12.5 TABLET ORAL EVERY 6 HOURS PRN
Status: DISCONTINUED | OUTPATIENT
Start: 2020-04-28 | End: 2020-04-28 | Stop reason: HOSPADM

## 2020-04-28 RX ORDER — PROPOFOL 10 MG/ML
INJECTION, EMULSION INTRAVENOUS PRN
Status: DISCONTINUED | OUTPATIENT
Start: 2020-04-28 | End: 2020-04-28 | Stop reason: SDUPTHER

## 2020-04-28 RX ORDER — LABETALOL 20 MG/4 ML (5 MG/ML) INTRAVENOUS SYRINGE
PRN
Status: DISCONTINUED | OUTPATIENT
Start: 2020-04-28 | End: 2020-04-28 | Stop reason: SDUPTHER

## 2020-04-28 RX ORDER — LIDOCAINE HYDROCHLORIDE 20 MG/ML
INJECTION, SOLUTION INTRAVENOUS PRN
Status: DISCONTINUED | OUTPATIENT
Start: 2020-04-28 | End: 2020-04-28 | Stop reason: SDUPTHER

## 2020-04-28 RX ORDER — NEOSTIGMINE METHYLSULFATE 5 MG/5 ML
SYRINGE (ML) INTRAVENOUS PRN
Status: DISCONTINUED | OUTPATIENT
Start: 2020-04-28 | End: 2020-04-28 | Stop reason: SDUPTHER

## 2020-04-28 RX ORDER — ONDANSETRON 2 MG/ML
INJECTION INTRAMUSCULAR; INTRAVENOUS PRN
Status: DISCONTINUED | OUTPATIENT
Start: 2020-04-28 | End: 2020-04-28 | Stop reason: SDUPTHER

## 2020-04-28 RX ORDER — DEXAMETHASONE SODIUM PHOSPHATE 4 MG/ML
INJECTION, SOLUTION INTRA-ARTICULAR; INTRALESIONAL; INTRAMUSCULAR; INTRAVENOUS; SOFT TISSUE PRN
Status: DISCONTINUED | OUTPATIENT
Start: 2020-04-28 | End: 2020-04-28 | Stop reason: SDUPTHER

## 2020-04-28 RX ADMIN — OXYCODONE 5 MG: 5 TABLET ORAL at 10:21

## 2020-04-28 RX ADMIN — LABETALOL 20 MG/4 ML (5 MG/ML) INTRAVENOUS SYRINGE 10 MG: at 08:15

## 2020-04-28 RX ADMIN — HYDRALAZINE HYDROCHLORIDE 10 MG: 20 INJECTION INTRAMUSCULAR; INTRAVENOUS at 08:24

## 2020-04-28 RX ADMIN — SODIUM CHLORIDE: 9 INJECTION, SOLUTION INTRAVENOUS at 08:26

## 2020-04-28 RX ADMIN — Medication 50 MG: at 08:09

## 2020-04-28 RX ADMIN — LIDOCAINE HYDROCHLORIDE 100 MG: 20 INJECTION, SOLUTION INTRAVENOUS at 07:57

## 2020-04-28 RX ADMIN — EPHEDRINE SULFATE 30 MG: 50 INJECTION INTRAMUSCULAR; INTRAVENOUS; SUBCUTANEOUS at 08:02

## 2020-04-28 RX ADMIN — Medication 4 MG: at 09:04

## 2020-04-28 RX ADMIN — Medication 100 MG: at 07:57

## 2020-04-28 RX ADMIN — FENTANYL CITRATE 100 MCG: 50 INJECTION, SOLUTION INTRAMUSCULAR; INTRAVENOUS at 08:09

## 2020-04-28 RX ADMIN — PROPOFOL 50 MG: 10 INJECTION, EMULSION INTRAVENOUS at 08:43

## 2020-04-28 RX ADMIN — FENTANYL CITRATE 100 MCG: 50 INJECTION, SOLUTION INTRAMUSCULAR; INTRAVENOUS at 09:20

## 2020-04-28 RX ADMIN — LABETALOL 20 MG/4 ML (5 MG/ML) INTRAVENOUS SYRINGE 10 MG: at 08:21

## 2020-04-28 RX ADMIN — PROPOFOL 150 MG: 10 INJECTION, EMULSION INTRAVENOUS at 07:57

## 2020-04-28 RX ADMIN — ONDANSETRON HYDROCHLORIDE 4 MG: 4 INJECTION, SOLUTION INTRAMUSCULAR; INTRAVENOUS at 09:04

## 2020-04-28 RX ADMIN — CEFAZOLIN SODIUM 2 G: 1 INJECTION, SOLUTION INTRAVENOUS at 08:06

## 2020-04-28 RX ADMIN — HYDROMORPHONE HYDROCHLORIDE 1 MG: 2 INJECTION INTRAMUSCULAR; INTRAVENOUS; SUBCUTANEOUS at 09:21

## 2020-04-28 RX ADMIN — ROCURONIUM BROMIDE 5 MG: 10 INJECTION INTRAVENOUS at 08:43

## 2020-04-28 RX ADMIN — SODIUM CHLORIDE: 9 INJECTION, SOLUTION INTRAVENOUS at 07:07

## 2020-04-28 RX ADMIN — ESMOLOL HYDROCHLORIDE 50 MG: 10 INJECTION, SOLUTION INTRAVENOUS at 08:09

## 2020-04-28 RX ADMIN — Medication 0.2 MG: at 07:57

## 2020-04-28 RX ADMIN — FENTANYL CITRATE 100 MCG: 50 INJECTION, SOLUTION INTRAMUSCULAR; INTRAVENOUS at 07:57

## 2020-04-28 RX ADMIN — DEXAMETHASONE SODIUM PHOSPHATE 8 MG: 4 INJECTION, SOLUTION INTRAMUSCULAR; INTRAVENOUS at 08:13

## 2020-04-28 RX ADMIN — Medication 0.6 MG: at 09:04

## 2020-04-28 RX ADMIN — Medication 20 MG: at 08:02

## 2020-04-28 RX ADMIN — METOCLOPRAMIDE 10 MG: 5 INJECTION, SOLUTION INTRAMUSCULAR; INTRAVENOUS at 08:13

## 2020-04-28 RX ADMIN — ROCURONIUM BROMIDE 20 MG: 10 INJECTION INTRAVENOUS at 07:57

## 2020-04-28 RX ADMIN — KETOROLAC TROMETHAMINE 30 MG: 30 INJECTION, SOLUTION INTRAMUSCULAR at 09:04

## 2020-04-28 ASSESSMENT — PAIN SCALES - GENERAL
PAINLEVEL_OUTOF10: 4
PAINLEVEL_OUTOF10: 10
PAINLEVEL_OUTOF10: 10
PAINLEVEL_OUTOF10: 1
PAINLEVEL_OUTOF10: 3
PAINLEVEL_OUTOF10: 4
PAINLEVEL_OUTOF10: 5

## 2020-04-28 ASSESSMENT — PULMONARY FUNCTION TESTS
PIF_VALUE: 35
PIF_VALUE: 35
PIF_VALUE: 34
PIF_VALUE: 4
PIF_VALUE: 33
PIF_VALUE: 0
PIF_VALUE: 35
PIF_VALUE: 29
PIF_VALUE: 2
PIF_VALUE: 34
PIF_VALUE: 33
PIF_VALUE: 1
PIF_VALUE: 35
PIF_VALUE: 5
PIF_VALUE: 3
PIF_VALUE: 19
PIF_VALUE: 34
PIF_VALUE: 28
PIF_VALUE: 16
PIF_VALUE: 27
PIF_VALUE: 32
PIF_VALUE: 32
PIF_VALUE: 18
PIF_VALUE: 35
PIF_VALUE: 34
PIF_VALUE: 34
PIF_VALUE: 35
PIF_VALUE: 34
PIF_VALUE: 35
PIF_VALUE: 32
PIF_VALUE: 1
PIF_VALUE: 35
PIF_VALUE: 31
PIF_VALUE: 35
PIF_VALUE: 33
PIF_VALUE: 5
PIF_VALUE: 35
PIF_VALUE: 14
PIF_VALUE: 34
PIF_VALUE: 35
PIF_VALUE: 17
PIF_VALUE: 18
PIF_VALUE: 4
PIF_VALUE: 35
PIF_VALUE: 18
PIF_VALUE: 18
PIF_VALUE: 17
PIF_VALUE: 34
PIF_VALUE: 16
PIF_VALUE: 34
PIF_VALUE: 6
PIF_VALUE: 35
PIF_VALUE: 16
PIF_VALUE: 5
PIF_VALUE: 17
PIF_VALUE: 34
PIF_VALUE: 33
PIF_VALUE: 35
PIF_VALUE: 35
PIF_VALUE: 31
PIF_VALUE: 34
PIF_VALUE: 1
PIF_VALUE: 35
PIF_VALUE: 34
PIF_VALUE: 35
PIF_VALUE: 35
PIF_VALUE: 3
PIF_VALUE: 2
PIF_VALUE: 35
PIF_VALUE: 3
PIF_VALUE: 31
PIF_VALUE: 4
PIF_VALUE: 34
PIF_VALUE: 25
PIF_VALUE: 35
PIF_VALUE: 34
PIF_VALUE: 1
PIF_VALUE: 34
PIF_VALUE: 35
PIF_VALUE: 17
PIF_VALUE: 35
PIF_VALUE: 1
PIF_VALUE: 32
PIF_VALUE: 31

## 2020-04-28 ASSESSMENT — PAIN DESCRIPTION - LOCATION: LOCATION: ABDOMEN

## 2020-04-28 ASSESSMENT — LIFESTYLE VARIABLES: SMOKING_STATUS: 1

## 2020-04-28 ASSESSMENT — PAIN DESCRIPTION - PAIN TYPE: TYPE: SURGICAL PAIN

## 2020-04-28 NOTE — PROGRESS NOTES
DISCHARGE INSTRUCTIONS REVIEWED WITH PATIENT AND FAMILY. ABDOMINAL SITED REMAIN CLEAN DRY AND INTACT. DISCHARGED BY WHEELCHAIR TO CAR.

## 2020-04-28 NOTE — H&P
oxyCODONE-acetaminophen (PERCOCET) 5-325 MG per tablet Take 1 tablet by mouth every 6 hours as needed.  lisinopril (PRINIVIL;ZESTRIL) 5 MG tablet Take 1 tablet by mouth daily 90 tablet 3    meloxicam (MOBIC) 15 MG tablet Take 1 tablet by mouth daily 90 tablet 3    ibuprofen (ADVIL;MOTRIN) 200 MG tablet Take 600 mg by mouth every 6 hours as needed for Pain      No current facility-administered medications for this visit.             Allergies   Allergen Reactions    Sulfa Antibiotics Hives     Family History         Family History   Problem Relation Age of Onset    Arthritis Mother     Heart Disease Mother     High Blood Pressure Mother     Arthritis Father     Cancer Father     prostate    Diabetes Father     Heart Disease Father     High Blood Pressure Father     Colon Cancer Father 80    Heart Disease Sister     High Blood Pressure Sister     Learning Disabilities Sister     Learning Disabilities Brother     High Blood Pressure Brother     Heart Disease Brother     Arthritis Brother     Cancer Brother     prostate     Social History         Socioeconomic History    Marital status:      Spouse name: Jolly    Number of children: 9    Years of education: 5    Highest education level: GED or equivalent   Occupational History    Occupation: [employer]     Employer: [employer]   Social Needs    Financial resource strain: Somewhat hard    Food insecurity     Worry: Never true     Inability: Never true    Transportation needs     Medical: No     Non-medical: No   Tobacco Use    Smoking status: Current Every Day Smoker     Packs/day: 0.50     Years: 40.00     Pack years: 20.00     Types: Cigarettes    Smokeless tobacco: Never Used    Tobacco comment: trying to cut down   Substance and Sexual Activity    Alcohol use: No     Alcohol/week: 0.0 standard drinks     Comment: occasionally    Drug use: No    Sexual activity: Yes   Lifestyle    Physical activity     Days per Effort: Pulmonary effort is normal. No respiratory distress. Breath sounds: Normal breath sounds. No stridor. No decreased breath sounds, wheezing or rales. Chest:   Chest wall: No deformity or tenderness. Abdominal:   General: Bowel sounds are normal. There is no distension or abdominal bruit. Palpations: Abdomen is soft. Abdomen is not rigid. There is no fluid wave, mass or pulsatile mass. Tenderness: There is no abdominal tenderness. There is no guarding or rebound. Hernia: A hernia is present. Hernia is present in the right inguinal area and left inguinal area. There is no hernia in the ventral area. Musculoskeletal: Normal range of motion. General: No tenderness. Lymphadenopathy:   Cervical: No cervical adenopathy. Upper Body:   Right upper body: No supraclavicular adenopathy. Left upper body: No supraclavicular adenopathy. Skin:   General: Skin is warm and dry. Coloration: Skin is not pale. Findings: No abrasion, bruising, burn, erythema, laceration or rash. Nails: There is no clubbing. Neurological:   Mental Status: He is alert and oriented to person, place, and time. Cranial Nerves: No cranial nerve deficit. Sensory: No sensory deficit. Gait: Gait normal.   Psychiatric:   Speech: Speech normal.   Behavior: Behavior normal. Behavior is cooperative. Thought Content:  Thought content normal.   Judgment: Judgment normal.           Lab Results   Component Value Date     03/03/2020    K 3.9 03/03/2020     03/03/2020    CO2 21 (L) 03/03/2020           Lab Results   Component Value Date    CREATININE 1.0 03/03/2020           Lab Results   Component Value Date    WBC 6.7 03/03/2020    HGB 13.8 (L) 03/03/2020    HCT 43.2 03/03/2020    MCV 89.1 03/03/2020     03/03/2020           Lab Results   Component Value Date    ALT 27 04/12/2018    AST 20 04/12/2018    ALKPHOS 66 04/12/2018    BILITOT 0.2 (L) 04/12/2018     Imaging - none       Patient Active

## 2020-04-28 NOTE — ANESTHESIA POSTPROCEDURE EVALUATION
Department of Anesthesiology  Postprocedure Note    Patient: Zach Charles  MRN: [id number]  YOB: 1960  Date of evaluation: 4/28/2020  Time:  11:38 AM     Procedure Summary     Date:  04/28/20 Room / Location:  [address] [place] [address] GIUSEPPE Joshua    Anesthesia Start:  9512 Anesthesia Stop:  0926    Procedure:  ROBOTIC LEFT and Right INGUINAL REPAIR WITH MESH (Bilateral Abdomen) Diagnosis:  (RECURRENT LEFT INGUINAL HERNIA)    Surgeon:  Myra Reyes MD Responsible Provider:  Nemesio White DO    Anesthesia Type:  general ASA Status:  2          Anesthesia Type: general    Anna Phase I: Anna Score: 10    Anna Phase II: Anna Score: 10    Last vitals: Reviewed and per EMR flowsheets.        Anesthesia Post Evaluation    Patient location during evaluation: PACU  Patient participation: complete - patient participated  Level of consciousness: awake  Airway patency: patent  Nausea & Vomiting: no nausea and no vomiting  Complications: no  Cardiovascular status: hemodynamically stable  Respiratory status: acceptable  Hydration status: stable

## 2020-04-28 NOTE — PROGRESS NOTES
ADMITTED TO [facility] AND ORIENTED TO UNIT. SCDS ON. FALL AND ALLERGY BANDS ON. PT VERBALIZED APPROVAL FOR FIRST NAME, LAST INITIAL AND PHYSICIAN NAME ON UNIT WHITEBOARD.

## 2020-04-28 NOTE — ANESTHESIA PRE PROCEDURE
Department of Anesthesiology  Preprocedure Note       Name:  Ayaan Mccrary   Age:  56 y.o.  :  1960                                          MRN:  [id number]         Date:  2020      Surgeon: Bekah Tenorio):  Rosalind Duggan MD    Procedure: ROBOTIC LEFT INGUINAL REPAIR WITH MESH, POSS RIGHT, POSS OPEN (Left Abdomen)    Medications prior to admission:   Prior to Admission medications    Medication Sig Start Date End Date Taking? Authorizing Provider   ibuprofen (ADVIL;MOTRIN) 200 MG tablet Take 600 mg by mouth every 6 hours as needed for Pain    Yes Historical Provider, MD   oxyCODONE-acetaminophen (PERCOCET) 5-325 MG per tablet Take 1 tablet by mouth every 6 hours as needed. 3/12/20   Historical Provider, MD   lisinopril (PRINIVIL;ZESTRIL) 5 MG tablet Take 1 tablet by mouth daily 20   Johnna Garcia MD   meloxicam PASQUALE MARSH [facility]) 15 MG tablet Take 1 tablet by mouth daily 20   Johnna Garcia MD       Current medications:    Current Facility-Administered Medications   Medication Dose Route Frequency Provider Last Rate Last Dose    0.9 % sodium chloride infusion   Intravenous Continuous Harriett Davies  mL/hr at 37/37/30 0707      ceFAZolin (ANCEF) 1 g in dextrose 5 % 50 mL IVPB (premix)  1 g Intravenous 30 Min Pre-Op Harriett Davies LPN           Allergies:     Allergies   Allergen Reactions    Sulfa Antibiotics Hives       Problem List:    Patient Active Problem List   Diagnosis Code    Bilateral inguinal hernia K40.20    Tobacco use disorder F17.200    Essential hypertension---hypertensive stress test. I10    Urinary retention R33.9       Past Medical History:        Diagnosis Date    Chronic back pain     Essential hypertension 2016    GERD (gastroesophageal reflux disease)     Osteoarthritis     Stomach ulcer        Past Surgical History:        Procedure Laterality Date    ARM SURGERY Left 2020    [facility]-Dr. Us Coad    COLONOSCOPY      [facility]    EGD      [facility]    HERNIA 04/12/2018    ALKPHOS 66 04/12/2018    AST 20 04/12/2018    ALT 27 04/12/2018       POC Tests: No results for input(s): POCGLU, POCNA, POCK, POCCL, POCBUN, POCHEMO, POCHCT in the last 72 hours. Coags:   Lab Results   Component Value Date    INR 1.01 04/12/2018    APTT 32.1 04/12/2018       HCG (If Applicable): No results found for: PREGTESTUR, PREGSERUM, HCG, HCGQUANT     ABGs: No results found for: PHART, PO2ART, RXT8LMG, HYT7OUB, BEART, V5RFIELP     Type & Screen (If Applicable):  No results found for: LABABO, [address]    Anesthesia Evaluation  Patient summary reviewed and Nursing notes reviewed no history of anesthetic complications:   Airway: Mallampati: II        Dental:          Pulmonary: breath sounds clear to auscultation  (+) current smoker          Patient smoked on day of surgery. Cardiovascular:    (+) hypertension:,       ECG reviewed  Rhythm: regular  Rate: normal                    Neuro/Psych:               GI/Hepatic/Renal:   (+) GERD:, PUD,           Endo/Other:                     Abdominal:       Abdomen: soft. Vascular:                                        Anesthesia Plan      general     ASA 2       Induction: intravenous. MIPS: Postoperative opioids intended and Prophylactic antiemetics administered. Anesthetic plan and risks discussed with patient. Plan discussed with CRNA.                   67 Jenny Longoria DO   4/28/2020

## 2020-04-29 ENCOUNTER — TELEPHONE (OUTPATIENT)
Dept: SURGERY | Age: 60
End: 2020-04-29

## 2020-05-07 ENCOUNTER — HOSPITAL ENCOUNTER (OUTPATIENT)
Dept: OCCUPATIONAL THERAPY | Age: 60
Setting detail: THERAPIES SERIES
Discharge: HOME | End: 2020-05-07
Payer: COMMERCIAL

## 2020-05-07 PROCEDURE — 97165 OT EVAL LOW COMPLEX 30 MIN: CPT

## 2020-05-07 PROCEDURE — 97110 THERAPEUTIC EXERCISES: CPT

## 2020-05-07 NOTE — FLOWSHEET NOTE
** PLEASE SIGN, DATE AND TIME CERTIFICATION BELOW AND RETURN TO [facility] OUTPATIENT REHABILITATION (FAX #: [phone]). ATTEST/CO-SIGN IF ACCESSING VIA [employer]. THANK YOU.**    I certify that I have examined the patient below and determined that Physical Medicine and Rehabilitation service is necessary and that I approve the established plan of care for up to 90 days or as specifically noted. Attestation, signature or co-signature of physician indicates approval of certification requirements.    ________________________ ____________ __________  Physician Signature   Date   Time    [address]    Time In: 8360  Time Out: 0998  Minutes: 45  Timed Code Treatment Minutes: 23 Minutes        UEFS Score: 28.75  UEFS =     Date: 2020  Patient Name: Caitie Ortega        CSN: [id number]     : 1960  (56 y.o.)  Gender: male   Referring Practitioner: Dr. Mendoza  Diagnosis: Z29.637 Primary osteoarthritis of left wrist  Treatment Diagnosis: decreased left hand motion  Additional Pertinent Hx: Patient had initiatl left wrist surgery last spring and had therapy until 2019. Patient states that his pain didn't get relieved with surgery so it was decided to remove the hardware and fuse the left wrist with new hardware on 3/12. Patient did develop a hernia and had double hernia surgery on 20. Reviewed allergies and medications - correct in EMR, but states that he is not taking any pain medication at current time. Comorbidities include HTN and arthritis that may influence rehab process. Caitie Ortega  has a past medical history of Chronic back pain, Essential hypertension (2016), GERD (gastroesophageal reflux disease), Osteoarthritis, and Stomach ulcer. Caitie Ortega  has a past surgical history that includes Rotator cuff repair (2005 x2); hernia repair; hernia repair (2012);  Vasectomy Functional Limits  Hearing: Within functional limits                    Sensation  Overall Sensation Status: Impaired  Additional Comments: Reports diminished sensation on ulnar side of L5, lack of sensation in radial side of L5 and ulnar side of L4. lack of sensation on ulnar side of left palm. Relates tingling in L3, L4,and L5. Hand Dominance: Left            LUE AROM (degrees)  LUE AROM : Exceptions  L Forearm Supination  0-90: 30  Left Hand PROM (degrees)  Left Hand PROM: Exceptions  L Thumb MCP 0-50: 10-50  L Thumb IP 0-80: 60, YDC=372  L Index  MCP 0-90: 90  L Index PIP 0-100: 95  L Index DIP 0-70: 60, FRB=116  L Long  MCP 0-90: 90  L Long PIP 0-100: 95  L Long DIP 0-70: 70, PTE=617  L Ring  MCP 0-90: 95  L Ring PIP 0-100: 100  L Ring DIP 0-70: 65, ZFO=011  L Little  MCP 0-90: 95  L Little PIP 0-100: 90  L Little DIP 0-70: 65, VBD=317  Left Hand AROM (degrees)  Left Hand AROM: Exceptions  Left Hand General AROM: Patient demonstrates very poor L5 and L4 abduction/adduction. Adduction is much more limited than abduction  L Thumb MCP 0-50: 10-45  L Thumb IP 0-80: 45 LALA=80  L Index  MCP 0-90: 10-75  L Index PIP 0-100: 85  L Index DIP 0-70: 55, LQT=227  L Long  MCP 0-90: 15-85  L Long PIP 0-100:   L Long DIP 0-70: 15-55, RWY=236  L Ring  MCP 0-90: 80  L Ring PIP 0-100: 30-90  L Ring DIP 0-70: 15-55, ULG=673  L Little  MCP 0-90: 90  L Little PIP 0-100: 35-80  L Little DIP 0-70: 15-55,, XZM=646                                                                                                              ADL  Additional Comments: Patient relates extreme difficulty with opening jars, using tools, doing laundry. Relates he is not able to tie shoes or do buttons. Has difficulty with writing. Concerned about his future ability to do work tasks. Activity Tolerance: Additional Comments:  Tolerated evaluation and treatment well     Assessment:  Performance

## 2020-05-11 ENCOUNTER — OFFICE VISIT (OUTPATIENT)
Dept: SURGERY | Age: 60
End: 2020-05-11

## 2020-05-11 VITALS
TEMPERATURE: 97.2 F | OXYGEN SATURATION: 98 % | BODY MASS INDEX: 25.95 KG/M2 | RESPIRATION RATE: 20 BRPM | DIASTOLIC BLOOD PRESSURE: 82 MMHG | WEIGHT: 152 LBS | HEIGHT: 64 IN | HEART RATE: 88 BPM | SYSTOLIC BLOOD PRESSURE: 126 MMHG

## 2020-05-11 PROCEDURE — 99024 POSTOP FOLLOW-UP VISIT: CPT | Performed by: NURSE PRACTITIONER

## 2020-05-11 ASSESSMENT — ENCOUNTER SYMPTOMS
NAUSEA: 0
DIARRHEA: 0
CHOKING: 0
BLOOD IN STOOL: 0
EYE DISCHARGE: 0
VOICE CHANGE: 0
SHORTNESS OF BREATH: 0
RECTAL PAIN: 0
COLOR CHANGE: 0
ANAL BLEEDING: 0
ABDOMINAL PAIN: 1
FACIAL SWELLING: 0
SORE THROAT: 0
COUGH: 0
EYE PAIN: 0
VOMITING: 0
ABDOMINAL DISTENTION: 0
PHOTOPHOBIA: 0
CONSTIPATION: 0
RHINORRHEA: 0
EYE ITCHING: 0
SINUS PRESSURE: 0
WHEEZING: 0
BACK PAIN: 0
TROUBLE SWALLOWING: 0
STRIDOR: 0
CHEST TIGHTNESS: 0
EYE REDNESS: 0
APNEA: 0

## 2020-05-11 NOTE — PROGRESS NOTES
Pressure Mother     Arthritis Father     Cancer Father         prostate    Diabetes Father     Heart Disease Father     High Blood Pressure Father     Colon Cancer Father 80    Heart Disease Sister     High Blood Pressure Sister     Learning Disabilities Sister     Learning Disabilities Brother     High Blood Pressure Brother     Heart Disease Brother     Arthritis Brother     Cancer Brother         prostate       Social History     Tobacco Use    Smoking status: Current Every Day Smoker     Packs/day: 0.50     Years: 40.00     Pack years: 20.00     Types: Cigarettes    Smokeless tobacco: Never Used    Tobacco comment: trying to cut down   Substance Use Topics    Alcohol use: No     Alcohol/week: 0.0 standard drinks     Comment: occasionally      Current Outpatient Medications   Medication Sig Dispense Refill    lisinopril (PRINIVIL;ZESTRIL) 5 MG tablet Take 1 tablet by mouth daily 90 tablet 3    meloxicam (MOBIC) 15 MG tablet Take 1 tablet by mouth daily 90 tablet 3    ibuprofen (ADVIL;MOTRIN) 200 MG tablet Take 600 mg by mouth every 6 hours as needed for Pain        No current facility-administered medications for this visit. Allergies   Allergen Reactions    Sulfa Antibiotics Hives       Subjective:     Review of Systems   Constitutional: Negative for activity change, appetite change, chills, diaphoresis, fatigue, fever and unexpected weight change. HENT: Negative for congestion, dental problem, drooling, ear discharge, ear pain, facial swelling, hearing loss, mouth sores, nosebleeds, postnasal drip, rhinorrhea, sinus pressure, sneezing, sore throat, tinnitus, trouble swallowing and voice change. Eyes: Negative for photophobia, pain, discharge, redness, itching and visual disturbance. Respiratory: Negative for apnea, cough, choking, chest tightness, shortness of breath, wheezing and stridor. Cardiovascular: Negative for chest pain, palpitations and leg swelling. Musculoskeletal: Normal range of motion and neck supple. Trachea: Trachea and phonation normal.   Cardiovascular:      Rate and Rhythm: Normal rate and regular rhythm. Pulses: Normal pulses. Heart sounds: S1 normal and S2 normal.   Pulmonary:      Effort: Pulmonary effort is normal. No tachypnea, bradypnea, accessory muscle usage or respiratory distress. Breath sounds: Normal breath sounds. No decreased breath sounds, wheezing or rales. Chest:      Chest wall: No tenderness. Abdominal:      General: Bowel sounds are normal. There is no distension. Palpations: Abdomen is soft. There is no mass. Tenderness: There is no abdominal tenderness. Musculoskeletal: Normal range of motion. General: No tenderness. Skin:     General: Skin is warm and dry. Findings: No abrasion, bruising, burn, ecchymosis, erythema, laceration, lesion or rash. Neurological:      Mental Status: He is alert and oriented to person, place, and time. Motor: No tremor, atrophy or abnormal muscle tone. Coordination: Coordination normal.      Gait: Gait normal.      Deep Tendon Reflexes: Reflexes are normal and symmetric. Psychiatric:         Speech: Speech normal.         Behavior: Behavior normal.         Thought Content: Thought content normal.       Patient Active Problem List   Diagnosis    Bilateral inguinal hernia    Tobacco use disorder    Essential hypertension---hypertensive stress test.    Urinary retention     Assessment:     1. Status post robotic repair of recurrent bilateral inguinal hernias with mesh   2. Recent wrist surgery    Plan:     1. Abdomen benign. Incisions are healing well without signs of infection. Continue wound care as directed. 2. No bulge or instability noted at old hernia site  3. Appetite doing well. Continue diet as tolerated. High protein supplements encouraged. 4. Bowel function doing well. Stool softeners as needed.   5. Wear abdominal binder

## 2020-05-13 ENCOUNTER — HOSPITAL ENCOUNTER (OUTPATIENT)
Dept: OCCUPATIONAL THERAPY | Age: 60
Setting detail: THERAPIES SERIES
Discharge: HOME | End: 2020-05-13
Payer: COMMERCIAL

## 2020-05-13 PROCEDURE — 97022 WHIRLPOOL THERAPY: CPT

## 2020-05-13 PROCEDURE — 97110 THERAPEUTIC EXERCISES: CPT

## 2020-05-13 ASSESSMENT — PAIN DESCRIPTION - LOCATION: LOCATION: FINGER (COMMENT WHICH ONE)

## 2020-05-13 ASSESSMENT — PAIN SCALES - GENERAL: PAINLEVEL_OUTOF10: 2

## 2020-05-13 ASSESSMENT — PAIN DESCRIPTION - ORIENTATION: ORIENTATION: LEFT

## 2020-05-13 NOTE — PROGRESS NOTES
progressing toward goals.       Patient Education:  Patient Education: added joint blocking and place and holds to HEP            Plan:  Current Treatment Recommendations: Strengthening, ROM, Self-Care / ADL, Modalities (comment), Patient/Caregiver Education & Training  Plan Comment: Continue with current POC  Specific instructions for Next Treatment: fluido, finger ROM                   Tammi Lim, MOT, OTR/L [id number]

## 2020-05-15 ENCOUNTER — HOSPITAL ENCOUNTER (OUTPATIENT)
Dept: OCCUPATIONAL THERAPY | Age: 60
Setting detail: THERAPIES SERIES
Discharge: HOME | End: 2020-05-15
Payer: COMMERCIAL

## 2020-05-15 PROCEDURE — 97110 THERAPEUTIC EXERCISES: CPT

## 2020-05-15 PROCEDURE — 97022 WHIRLPOOL THERAPY: CPT

## 2020-05-18 ENCOUNTER — HOSPITAL ENCOUNTER (OUTPATIENT)
Dept: OCCUPATIONAL THERAPY | Age: 60
Setting detail: THERAPIES SERIES
Discharge: HOME | End: 2020-05-18
Payer: COMMERCIAL

## 2020-05-18 PROCEDURE — 97022 WHIRLPOOL THERAPY: CPT

## 2020-05-18 PROCEDURE — 97110 THERAPEUTIC EXERCISES: CPT

## 2020-05-18 ASSESSMENT — PAIN SCALES - GENERAL: PAINLEVEL_OUTOF10: 3

## 2020-05-18 ASSESSMENT — PAIN DESCRIPTION - ORIENTATION: ORIENTATION: LEFT

## 2020-05-18 NOTE — PROGRESS NOTES
[facility]  OUTPATIENT OCCUPATIONAL THERAPY  Daily Note  [facility].    Time In: 5418  Time Out: [address]  Minutes: 45  Timed Code Treatment Minutes: 30 Minutes                Date: 2020  Patient Name: Danny Leary        CSN: [id number]   : 1960  (56 y.o.)  Gender: male   Referring Practitioner: Dr. Jorge Johnson  Diagnosis: M19.032 Primary osteoarthritis of left wrist          General:  OT Visit Information  Onset Date: 20  OT Insurance Information: BC/BS - allowed 25 visits combined per calender year  Total # of Visits Approved: 25  Total # of Visits to Date: 4  Certification Period Expiration Date: 20  Progress Note Counter: 4/10 for PN  Comments: returns to see Dr. Vicky Alanis on        Restrictions/Precautions:       Position Activity Restriction  Other position/activity restrictions: hernia surgery  - no lifting more than 10#; left wrist surgery on 3/12 for hardware removal of left wrist and left wrist fusion with new hardward; HTN         Subjective:  Subjective: Patient reports that he is performing his HEP between 2 to 4 times a day. Reports that he might have overdone activity with the hand yesterday, pain increase today. Pain:  Patient Currently in Pain: Yes(Reports that he occasionally has pain in his left wrist, but relates that it is minor. States that he gets occasional pain in his left little finger)  Pain Assessment: 0-10  Pain Level: 3  Pain Location: (left lateral epicondyle, back of the hand)  Pain Orientation: Left       Objective:     Upper Extremity Function  UE AROM: tendon glides; joint blocking and pull throughs; hand flat on the tabletop and lifting each finger off of the table - 5 reps each.     UE PROM: PROM left digits each joint and composite flexion; intrinsic stretch  UE AAROM: opposition of the thumb to the 5th digit; finger abduction/adduction 2x10 - max difficulty with 5th digit  UE Stretching: IASTM to the hand

## 2020-05-21 ENCOUNTER — APPOINTMENT (OUTPATIENT)
Dept: OCCUPATIONAL THERAPY | Age: 60
End: 2020-05-21
Payer: COMMERCIAL

## 2020-05-26 ENCOUNTER — HOSPITAL ENCOUNTER (OUTPATIENT)
Dept: OCCUPATIONAL THERAPY | Age: 60
Setting detail: THERAPIES SERIES
Discharge: HOME | End: 2020-05-26
Payer: COMMERCIAL

## 2020-05-26 PROCEDURE — 97110 THERAPEUTIC EXERCISES: CPT

## 2020-05-26 PROCEDURE — 97022 WHIRLPOOL THERAPY: CPT

## 2020-05-26 ASSESSMENT — PAIN SCALES - GENERAL: PAINLEVEL_OUTOF10: 1

## 2020-05-26 ASSESSMENT — PAIN DESCRIPTION - ORIENTATION: ORIENTATION: LEFT

## 2020-05-26 ASSESSMENT — PAIN DESCRIPTION - LOCATION: LOCATION: WRIST

## 2020-05-28 ENCOUNTER — APPOINTMENT (OUTPATIENT)
Dept: OCCUPATIONAL THERAPY | Age: 60
End: 2020-05-28
Payer: COMMERCIAL

## 2020-05-28 ENCOUNTER — HOSPITAL ENCOUNTER (OUTPATIENT)
Dept: OCCUPATIONAL THERAPY | Age: 60
Setting detail: THERAPIES SERIES
Discharge: HOME | End: 2020-05-28
Payer: COMMERCIAL

## 2020-05-28 NOTE — DISCHARGE SUMMARY
[address]        Date: 2020  Patient Name: Jayna Gordon        CSN: [id number]   : 1960  (56 y.o.)  Gender: male   Referring Practitioner: Dr. Ari Alvarez  Diagnosis: M19.032 Primary osteoarthritis of left wrist    Patient is discharged from Occupational Therapy services at this time. See last note for details related to results of therapy and goal achievement. Reason for discharge: Patient called to cancel due to physician telling him that he needed no more therapy. Patient relates that he is returning to work 4 hours per day at the current time.  Jhon Diallo, OTR/L #[id number]

## 2020-06-24 ENCOUNTER — OFFICE VISIT (OUTPATIENT)
Dept: SURGERY | Age: 60
End: 2020-06-24

## 2020-06-24 VITALS
SYSTOLIC BLOOD PRESSURE: 128 MMHG | TEMPERATURE: 97.4 F | HEIGHT: 64 IN | DIASTOLIC BLOOD PRESSURE: 80 MMHG | HEART RATE: 79 BPM | RESPIRATION RATE: 20 BRPM | BODY MASS INDEX: 25.95 KG/M2 | OXYGEN SATURATION: 98 % | WEIGHT: 152 LBS

## 2020-06-24 PROCEDURE — 99024 POSTOP FOLLOW-UP VISIT: CPT | Performed by: SURGERY

## 2020-06-25 ASSESSMENT — ENCOUNTER SYMPTOMS
WHEEZING: 0
ABDOMINAL DISTENTION: 0
NAUSEA: 0
CHOKING: 0
EYE REDNESS: 0
COLOR CHANGE: 0
RHINORRHEA: 0
VOICE CHANGE: 0
CHEST TIGHTNESS: 0
EYE ITCHING: 0
ABDOMINAL PAIN: 0
STRIDOR: 0
COUGH: 0
APNEA: 0
EYE DISCHARGE: 0
VOMITING: 0
SORE THROAT: 0
PHOTOPHOBIA: 0
ANAL BLEEDING: 0
BACK PAIN: 0
EYE PAIN: 0
DIARRHEA: 0
SINUS PRESSURE: 0
TROUBLE SWALLOWING: 0
FACIAL SWELLING: 0
SHORTNESS OF BREATH: 0
ALLERGIC/IMMUNOLOGIC NEGATIVE: 1
CONSTIPATION: 0
RECTAL PAIN: 0
BLOOD IN STOOL: 0

## 2020-06-25 NOTE — PROGRESS NOTES
Subjective:      Patient ID: Caitie Ortega is a 56 y.o. male. Chief Complaint   Patient presents with    Surgical Consult     Est patient-last seen in the office 5/11/2020-s/p Robotic repair of recurrent bilateral inguinal hernias with mesh 4/28/20-having pain in the left groin     HPI  Adalberto Quinn is a 70-year-old male who presents for follow-up status post robotic repair recurrent bilateral inguinal hernia with mesh April 28, 2020. He had significant severe pain in the left groin requiring him to use a cane to walk prior to surgery. Postoperatively he states he slowly healed but admits to persistent left groin pain. Still in the same area as to preoperative pain but admits it is better. Not requiring a cane to walk at this time. History of a right inguinal hernia repair as a child. Left inguinal hernia repair in 2012. He denies any paresthesia or numbness. No urinary complaints. Normal bowel function. No hematochezia or melena. No generalized abdominal pain. No chest pain or shortness of breath. No fever, chills or sweats. Incisions have healed. No incisional/wound breakdown, bleeding or infection. Denies being very active since surgery. Denies lifting anything heavy. Review of Systems   Constitutional: Negative for activity change, appetite change, chills, diaphoresis, fatigue, fever and unexpected weight change. HENT: Negative for congestion, dental problem, drooling, ear discharge, ear pain, facial swelling, hearing loss, mouth sores, nosebleeds, postnasal drip, rhinorrhea, sinus pressure, sneezing, sore throat, tinnitus, trouble swallowing and voice change. Eyes: Negative for photophobia, pain, discharge, redness, itching and visual disturbance. Respiratory: Negative for apnea, cough, choking, chest tightness, shortness of breath, wheezing and stridor. Cardiovascular: Negative for chest pain, palpitations and leg swelling.    Gastrointestinal: Negative for abdominal distention, abdominal pain, anal bleeding, blood in stool, constipation, diarrhea, nausea, rectal pain and vomiting. Left groin pain     Endocrine: Negative. Genitourinary: Negative for decreased urine volume, difficulty urinating, discharge, dysuria, enuresis, flank pain, frequency, genital sores, hematuria, penile pain, penile swelling, scrotal swelling, testicular pain and urgency. Musculoskeletal: Positive for gait problem (groin pain). Negative for arthralgias, back pain, joint swelling, myalgias, neck pain and neck stiffness. Skin: Negative for color change, pallor, rash and wound. Allergic/Immunologic: Negative. Neurological: Negative for dizziness, tremors, seizures, syncope, facial asymmetry, speech difficulty, weakness, light-headedness, numbness and headaches. Hematological: Negative for adenopathy. Does not bruise/bleed easily. Psychiatric/Behavioral: Negative for agitation, behavioral problems, confusion, decreased concentration, dysphoric mood, hallucinations, self-injury, sleep disturbance and suicidal ideas. The patient is not nervous/anxious and is not hyperactive.         Past Medical History:   Diagnosis Date    Chronic back pain     Essential hypertension 4/1/2016    GERD (gastroesophageal reflux disease)     Osteoarthritis     Stomach ulcer        Past Surgical History:   Procedure Laterality Date    ARM SURGERY Left 03/2020    [facility]-Dr. Griffin Sarkar    COLONOSCOPY  2019    [facility]    EGD  2019    [facility]    HERNIA REPAIR      child    HERNIA REPAIR  [facility]/03/2012    [facility]-left indirect reducible inguinal hernia with mesh    HERNIA REPAIR Bilateral 4/28/2020    ROBOTIC LEFT and Right INGUINAL REPAIR WITH MESH performed by Alexys Dasilva MD at [facility]  2005 x2    left-Dr. Mihaela Nick    TOOTH EXTRACTION  1997    VASECTOMY  2005    WRIST SURGERY  12/27/2018    left wirst        Current Outpatient Medications   Medication Sig Dispense Refill    lisinopril

## 2020-06-29 ENCOUNTER — HOSPITAL ENCOUNTER (OUTPATIENT)
Dept: ULTRASOUND IMAGING | Age: 60
Discharge: HOME | End: 2020-06-29
Payer: COMMERCIAL

## 2020-06-29 PROCEDURE — 76882 US LMTD JT/FCL EVL NVASC XTR: CPT

## 2020-06-30 ENCOUNTER — TELEPHONE (OUTPATIENT)
Dept: SURGERY | Age: 60
End: 2020-06-30

## 2020-07-06 ENCOUNTER — HOSPITAL ENCOUNTER (OUTPATIENT)
Dept: CT IMAGING | Age: 60
Discharge: HOME | End: 2020-07-06
Payer: COMMERCIAL

## 2020-07-06 ENCOUNTER — HOSPITAL ENCOUNTER (OUTPATIENT)
Age: 60
Discharge: HOME | End: 2020-07-06
Payer: COMMERCIAL

## 2020-07-06 LAB
CREATININE, WHOLE BLOOD: 1 MG/DL (ref 0.5–1.2)
ESTIMATED GFR, PCACC: 80 ML/MIN/1.73M2

## 2020-07-06 PROCEDURE — 6360000004 HC RX CONTRAST MEDICATION: Performed by: SURGERY

## 2020-07-06 PROCEDURE — 74177 CT ABD & PELVIS W/CONTRAST: CPT

## 2020-07-06 PROCEDURE — 82565 ASSAY OF CREATININE: CPT

## 2020-07-06 RX ADMIN — IOHEXOL 50 ML: 240 INJECTION, SOLUTION INTRATHECAL; INTRAVASCULAR; INTRAVENOUS; ORAL at 16:36

## 2020-07-06 RX ADMIN — IOPAMIDOL 100 ML: 755 INJECTION, SOLUTION INTRAVENOUS at 16:36

## 2020-07-13 ENCOUNTER — TELEPHONE (OUTPATIENT)
Dept: SURGERY | Age: 60
End: 2020-07-13

## 2020-07-13 NOTE — TELEPHONE ENCOUNTER
Called pt at request of Dr. Astrid Dinh to go over CT scan results. Discussed could be either left inguinal hernia and/or scar tissue per radiologist.  Bilateral groin US were normal with no hernias seen. Options of waiting and seeing if symptoms resolve or f/u appointment with Dr. Astrid Dinh to discuss laparoscopy where he would look into groins and see if there is a true recurrent hernia or not and fix if he does see one. Pt would like to think about it, does not really want to have another surgery at this time and be off work.   Pt states he will call back with decision

## 2021-02-24 ENCOUNTER — OFFICE VISIT (OUTPATIENT)
Dept: SURGERY | Age: 61
End: 2021-02-24
Payer: COMMERCIAL

## 2021-02-24 VITALS
HEART RATE: 93 BPM | DIASTOLIC BLOOD PRESSURE: 60 MMHG | SYSTOLIC BLOOD PRESSURE: 118 MMHG | WEIGHT: 157.6 LBS | BODY MASS INDEX: 26.91 KG/M2 | HEIGHT: 64 IN | RESPIRATION RATE: 18 BRPM | OXYGEN SATURATION: 95 % | TEMPERATURE: 95.6 F

## 2021-02-24 DIAGNOSIS — Z87.19 S/P LEFT INGUINAL HERNIA REPAIR: ICD-10-CM

## 2021-02-24 DIAGNOSIS — I10 ESSENTIAL HYPERTENSION: ICD-10-CM

## 2021-02-24 DIAGNOSIS — R10.32 LEFT GROIN PAIN: ICD-10-CM

## 2021-02-24 DIAGNOSIS — Z01.818 PRE-OP TESTING: ICD-10-CM

## 2021-02-24 DIAGNOSIS — Z98.890 S/P LEFT INGUINAL HERNIA REPAIR: ICD-10-CM

## 2021-02-24 DIAGNOSIS — K40.91 RECURRENT INGUINAL HERNIA OF LEFT SIDE WITHOUT OBSTRUCTION OR GANGRENE: Primary | ICD-10-CM

## 2021-02-24 PROCEDURE — 99213 OFFICE O/P EST LOW 20 MIN: CPT | Performed by: SURGERY

## 2021-02-25 ASSESSMENT — ENCOUNTER SYMPTOMS
EYE DISCHARGE: 0
WHEEZING: 0
BLOOD IN STOOL: 0
COLOR CHANGE: 0
TROUBLE SWALLOWING: 0
ABDOMINAL DISTENTION: 0
ANAL BLEEDING: 0
RHINORRHEA: 0
CHOKING: 0
APNEA: 0
CHEST TIGHTNESS: 0
SHORTNESS OF BREATH: 0
BACK PAIN: 0
ABDOMINAL PAIN: 0
EYE ITCHING: 0
STRIDOR: 0
EYE PAIN: 0
NAUSEA: 0
EYE REDNESS: 0
DIARRHEA: 0
VOMITING: 0
FACIAL SWELLING: 0
ALLERGIC/IMMUNOLOGIC NEGATIVE: 1
VOICE CHANGE: 0
PHOTOPHOBIA: 0
CONSTIPATION: 0
SORE THROAT: 0
COUGH: 0
SINUS PRESSURE: 0
RECTAL PAIN: 0

## 2021-02-25 NOTE — PROGRESS NOTES
Odalis Connor (:  1960)     ASSESSMENT:  1. Recurrent left inguinal hernia    PLAN:  1. The pros and cons of recurrent left inguinal hernia repair with possible mesh was discussed with patient. He states he would like to think about what he would like to do next prior to scheduling any surgical intervention. Discussed with him about surgery, observation and second opinion. All questions answered. He states he will call back when he is ready to schedule recurrent left inguinal hernia repair with mesh. 2.  Restrictions discussed with patient. All questions answered. 3.  Follow-up if/when ready to proceed with repair. 4.  Signs and symptoms reviewed with patient that would be concerning and needHe to return to office for re-evaluation. Patient states He will call if He has questions or concerns.     SUBJECTIVE/OBJECTIVE:    Chief Complaint   Patient presents with    Surgical Consult     Est patient-last seen in the office 2020-s/p Robotic repair of recurrent bilateral inguinal hernias with mesh 20-having pain in the left groin-CT done on 2020-discuss surgery     HPI Tashia Ansari is a 75-year-old male who presents with left groin discomfort and a bulge. He unfortunately underwent a robotic recurrent left inguinal hernia repair April 2020. Denies any issues on the right side. Left side he has pain and discomfort. He states it really has not changed much since just after surgery. Some swelling there but not an obvious bulge. Previous CT imaging demonstrated a left inguinal recurrent hernia with some fat within the canal as well as scar tissue. Pain is mild to moderate. Worse with certain positions and palpation. Denies doing anything real physically active as he has hurt his hand on the left side in the past preventing him from lifting much. No change in bowel function. No hematochezia or melena. No new urinary complaints. Tolerating diet. Discomfort does radiate down towards the left scrotal/testicular region. No issues on the right side. No chest pain or shortness of breath. No lightheadedness or dizziness. Review of Systems   Constitutional: Negative for activity change, appetite change, chills, diaphoresis, fatigue, fever and unexpected weight change. HENT: Positive for hearing loss. Negative for congestion, dental problem, drooling, ear discharge, ear pain, facial swelling, mouth sores, nosebleeds, postnasal drip, rhinorrhea, sinus pressure, sneezing, sore throat, tinnitus, trouble swallowing and voice change. Eyes: Negative for photophobia, pain, discharge, redness, itching and visual disturbance. Respiratory: Negative for apnea, cough, choking, chest tightness, shortness of breath, wheezing and stridor. Cardiovascular: Negative for chest pain, palpitations and leg swelling. Gastrointestinal: Negative for abdominal distention, abdominal pain, anal bleeding, blood in stool, constipation, diarrhea, nausea, rectal pain and vomiting. Endocrine: Negative. Genitourinary: Positive for testicular pain. Negative for decreased urine volume, difficulty urinating, discharge, dysuria, enuresis, flank pain, frequency, genital sores, hematuria, penile pain, penile swelling, scrotal swelling and urgency. Musculoskeletal: Positive for arthralgias. Negative for back pain, gait problem, joint swelling, myalgias, neck pain and neck stiffness. Skin: Negative for color change, pallor, rash and wound. Allergic/Immunologic: Negative. Neurological: Negative for dizziness, tremors, seizures, syncope, facial asymmetry, speech difficulty, weakness, light-headedness, numbness and headaches. Hematological: Negative for adenopathy. Does not bruise/bleed easily. Psychiatric/Behavioral: Negative for agitation, behavioral problems, confusion, decreased concentration, dysphoric mood, hallucinations, self-injury, sleep disturbance and suicidal ideas. The patient is not nervous/anxious and is not hyperactive.         Past Medical History:   Diagnosis Date    Chronic back pain     Cough     patient is a  has a chronic cough    Essential hypertension 04/01/2016    GERD (gastroesophageal reflux disease)     Osteoarthritis     Stomach ulcer        Past Surgical History:   Procedure Laterality Date    ARM SURGERY Left 03/2020    [facility]    COLONOSCOPY  2019    [facility]    EGD  2019    Replaced by [facility]    HERNIA REPAIR      child    HERNIA REPAIR  01/03/2012    [place]-left indirect reducible inguinal hernia with mesh    HERNIA REPAIR Bilateral 04/28/2020    ROBOTIC LEFT and Right INGUINAL REPAIR WITH MESH performed by Crystal Stuart MD at [address]  2005 x2    left- [address] EXTRACTION  1997    VASECTOMY  2005    WRIST SURGERY  12/27/2018    left wirst        Current Outpatient Medications   Medication Sig Dispense Refill    lisinopril (PRINIVIL;ZESTRIL) 5 MG tablet Take 1 tablet by mouth daily 90 tablet 3  meloxicam (MOBIC) 15 MG tablet Take 1 tablet by mouth daily 90 tablet 3    ibuprofen (ADVIL;MOTRIN) 200 MG tablet Take 600 mg by mouth every 6 hours as needed for Pain        No current facility-administered medications for this visit.         Allergies   Allergen Reactions    Sulfa Antibiotics Hives       Family History   Problem Relation Age of Onset    Arthritis Mother     Heart Disease Mother     High Blood Pressure Mother     Arthritis Father     Cancer Father         prostate    Diabetes Father     Heart Disease Father     High Blood Pressure Father     Colon Cancer Father 80    Heart Disease Sister     High Blood Pressure Sister     Learning Disabilities Sister     Learning Disabilities Brother     High Blood Pressure Brother     Heart Disease Brother     Arthritis Brother     Cancer Brother         prostate       Social History     Socioeconomic History    Marital status:      Spouse name: Jolly    Number of children: 9    Years of education: 5    Highest education level: GED or equivalent   Occupational History    Occupation: [employer]     Employer: [employer]   Social Needs    Financial resource strain: Somewhat hard    Food insecurity     Worry: Never true     Inability: Never true    Transportation needs     Medical: No     Non-medical: No   Tobacco Use    Smoking status: Current Every Day Smoker     Packs/day: 0.50     Years: 40.00     Pack years: 20.00     Types: Cigarettes    Smokeless tobacco: Never Used    Tobacco comment: trying to cut down   Substance and Sexual Activity    Alcohol use: No     Alcohol/week: 0.0 standard drinks     Comment: occasionally    Drug use: No    Sexual activity: Yes   Lifestyle    Physical activity     Days per week: Not on file     Minutes per session: Not on file    Stress: Not on file   Relationships    Social connections     Talks on phone: Not on file     Gets together: Not on file Attends Orthodoxy service: Not on file     Active member of club or organization: Not on file     Attends meetings of clubs or organizations: Not on file     Relationship status: Not on file    Intimate partner violence     Fear of current or ex partner: Not on file     Emotionally abused: Not on file     Physically abused: Not on file     Forced sexual activity: Not on file   Other Topics Concern    Not on file   Social History Narrative    Not on file     Vitals:    02/24/21 1022   BP: 118/60   Site: Right Upper Arm   Position: Sitting   Cuff Size: Medium Adult   Pulse: 93   Resp: 18   Temp: 95.6 °F (35.3 °C)   TempSrc: Temporal   SpO2: 95%   Weight: 157 lb 9.6 oz (71.5 kg)   Height: 5' 4\" (1.626 m)     Body mass index is 27.05 kg/m². Wt Readings from Last 3 Encounters:   02/24/21 157 lb 9.6 oz (71.5 kg)   06/24/20 152 lb (68.9 kg)   05/11/20 152 lb (68.9 kg)     Physical Exam  Vitals signs reviewed. Constitutional:       General: He is not in acute distress. Appearance: He is well-developed. He is not diaphoretic. HENT:      Head: Normocephalic and atraumatic. Right Ear: External ear normal.      Left Ear: External ear normal.      Nose: Nose normal.   Eyes:      General: No scleral icterus. Right eye: No discharge. Left eye: No discharge. Conjunctiva/sclera: Conjunctivae normal.   Neck:      Musculoskeletal: Normal range of motion and neck supple. Cardiovascular:      Rate and Rhythm: Normal rate and regular rhythm. Heart sounds: Normal heart sounds. Pulmonary:      Effort: Pulmonary effort is normal. No respiratory distress. Breath sounds: Normal breath sounds. No wheezing or rales. Chest:      Chest wall: No tenderness. Abdominal:      General: Bowel sounds are normal. There is no distension. Palpations: Abdomen is soft. There is no mass. Tenderness: There is no abdominal tenderness. There is no guarding or rebound. Hernia: A hernia is present. Hernia is present in the left inguinal area. Musculoskeletal: Normal range of motion. General: No tenderness. Skin:     General: Skin is warm and dry. Coloration: Skin is not pale. Findings: No erythema or rash. Neurological:      Mental Status: He is alert and oriented to person, place, and time. Cranial Nerves: No cranial nerve deficit. Psychiatric:         Behavior: Behavior normal.         Thought Content: Thought content normal.         Judgment: Judgment normal.       Lab Results   Component Value Date     03/03/2020    K 4.6 04/28/2020     03/03/2020    CO2 21 (L) 03/03/2020     Lab Results   Component Value Date    CREATININE 1.0 07/06/2020     Lab Results   Component Value Date    WBC 6.7 03/03/2020    HGB 13.8 (L) 03/03/2020    HCT 43.2 03/03/2020    MCV 89.1 03/03/2020     03/03/2020     Imaging -   Narrative   PROCEDURE: CT ABDOMEN PELVIS W IV CONTRAST       CLINICAL INFORMATION: Left groin pain, history of inguinal hernia repairs       TECHNIQUE: CT of the abdomen and pelvis was performed following administration of oral and 100 mL Isovue-370 intravenous contrast. Axial images as well as coronal and sagittal reconstructions were obtained.       All CT scans at this facility use dose modulation, iterative reconstruction, and/or weight-based dosing when appropriate to reduce radiation dose to as low as reasonably achievable.       COMPARISON: CT abdomen and pelvis 12/17/2012       FINDINGS:        Lower thorax: There is minimal scarring at the bilateral lung bases. No pleural effusion is identified.       Abdomen: There is no free intraperitoneal air or fluid. Bowel is normal in course and caliber without evidence of obstruction. There are diverticula throughout the colon without evidence of diverticulitis. The gallbladder is contracted.  Diffuse hypoattenuation in the liver may be secondary to hepatic steatosis. The pancreas, spleen, adrenal glands and kidneys are normal. Atherosclerotic calcifications are seen in the abdominal aorta without evidence of aneurysm. There is no mesenteric or    retroperitoneal lymphadenopathy. Degenerative changes are present in the lumbar spine without evidence of aggressive osseous lesions.       Pelvis: Urinary bladder is unremarkable. Phleboliths are noted in the pelvis. There is no pelvic or inguinal lymphadenopathy. There is a small left inguinal hernia which contains fat and likely scar tissue. Degenerative changes are present in the pelvis    without evidence of aggressive osseous lesions.           Impression   1. No acute intra-abdominal or intrapelvic findings. 2. Uncomplicated pancolonic diverticulosis. 3. Small left-sided inguinal hernia containing fat and scar tissue.       Final report electronically signed by Dr. Timothy Dunlap on 7/6/2020 4:51 PM       Patient Active Problem List   Diagnosis    Bilateral inguinal hernia    Tobacco use disorder    Essential hypertension---hypertensive stress test.    Urinary retention     An electronic signature was used to authenticate this note.     --Elian Enrique MD

## 2021-03-02 ENCOUNTER — TELEPHONE (OUTPATIENT)
Dept: FAMILY MEDICINE CLINIC | Age: 61
End: 2021-03-02

## 2021-03-02 DIAGNOSIS — I10 ESSENTIAL HYPERTENSION: Chronic | ICD-10-CM

## 2021-03-02 RX ORDER — LISINOPRIL 5 MG/1
5 TABLET ORAL DAILY
Qty: 10 TABLET | Refills: 0 | Status: SHIPPED | OUTPATIENT
Start: 2021-03-02 | End: 2021-03-05 | Stop reason: SDUPTHER

## 2021-03-02 NOTE — TELEPHONE ENCOUNTER
Pt called in and stated that he has been without his blood pressure medication for 3 days and he is starting to feel it, he has an appt set for this Friday but he is not able to go that long without his medication he is asking for a short supplyf just to get him through if possible please follow up

## 2021-03-04 ENCOUNTER — HOSPITAL ENCOUNTER (OUTPATIENT)
Age: 61
Discharge: HOME | End: 2021-03-04
Payer: COMMERCIAL

## 2021-03-04 LAB
EKG ATRIAL RATE: 71 BPM
EKG P AXIS: 48 DEGREES
EKG P-R INTERVAL: 218 MS
EKG Q-T INTERVAL: 384 MS
EKG QRS DURATION: 102 MS
EKG QTC CALCULATION (BAZETT): 417 MS
EKG R AXIS: 56 DEGREES
EKG T AXIS: 35 DEGREES
EKG VENTRICULAR RATE: 71 BPM

## 2021-03-04 PROCEDURE — 93005 ELECTROCARDIOGRAM TRACING: CPT | Performed by: SURGERY

## 2021-03-04 ASSESSMENT — ENCOUNTER SYMPTOMS
NAUSEA: 0
SHORTNESS OF BREATH: 0
VOMITING: 0

## 2021-03-04 NOTE — PROGRESS NOTES
[address] [place] [address] [address], [address], [place]  Phone:  [phone]  Fax:  [phone]    3/5/2021    TELEHEALTH EVALUATION -- Audio/Visual (During VIDMG-34 public health emergency)    HPI:    Mandi Knapp (:  1960) has requested an audio/video evaluation for the following concern(s):  F/u hypertension    Hypertension  This is a chronic problem. The current episode started more than 1 year ago. The problem is unchanged. The problem is controlled. Pertinent negatives include no chest pain, palpitations, peripheral edema or shortness of breath. There are no associated agents to hypertension. Risk factors for coronary artery disease include male gender. Past treatments include ACE inhibitors. The current treatment provides moderate improvement. There are no compliance problems. BPs at home have been good. Most recently it was 122/70s. He's getting ready to have his 3rd hernia surgery by Dr. Radha Haywood. He has custody of 5 grandchildren. Review of Systems   Constitutional: Negative for chills and fever. Respiratory: Negative for shortness of breath. Cardiovascular: Negative for chest pain and palpitations. Gastrointestinal: Negative for nausea and vomiting. Prior to Visit Medications    Medication Sig Taking?  Authorizing Provider   lisinopril (PRINIVIL;ZESTRIL) 5 MG tablet Take 1 tablet by mouth daily Yes Negar Spencer MD   meloxicam (MOBIC) 15 MG tablet Take 1 tablet by mouth daily Yes Negar Spencer MD       Social History     Tobacco Use    Smoking status: Current Every Day Smoker     Packs/day: 0.50     Years: 40.00     Pack years: 20.00     Types: Cigarettes    Smokeless tobacco: Never Used    Tobacco comment: trying to cut down   Substance Use Topics    Alcohol use: No     Alcohol/week: 0.0 standard drinks     Comment: occasionally    Drug use: No        Allergies   Allergen Reactions    Sulfa Antibiotics Hives   , Past Medical History:   Diagnosis Date    Chronic back pain     Cough     patient is a  has a chronic cough    Essential hypertension 04/01/2016    GERD (gastroesophageal reflux disease)     Osteoarthritis     Stomach ulcer        PHYSICAL EXAMINATION:    Constitutional: [x] Appears well-developed and well-nourished [x] No apparent distress      [] Abnormal-   Mental status  [x] Alert and awake  [] Oriented to person/place/time []Able to follow commands      Eyes:  EOM    [x]  Normal  [] Abnormal-  Sclera  [x]  Normal  [] Abnormal -         Discharge [x]  None visible  [] Abnormal -    HENT:   [x] Normocephalic, atraumatic. [] Abnormal   [x] Mouth/Throat: Mucous membranes are moist.     External Ears [] Normal  [] Abnormal-     Neck: [] No visualized mass     Pulmonary/Chest: [x] Respiratory effort normal.  [x] No visualized signs of difficulty breathing or respiratory distress        [] Abnormal-      Musculoskeletal:   [] Normal gait with no signs of ataxia         [] Normal range of motion of neck        [] Abnormal-       Neurological:        [] No Facial Asymmetry (Cranial nerve 7 motor function) (limited exam to video visit)          [] No gaze palsy        [] Abnormal-         Skin:        [] No significant exanthematous lesions or discoloration noted on facial skin         [] Abnormal-            Psychiatric:       [] Normal Affect [] No Hallucinations        [] Abnormal-       ASSESSMENT/PLAN:  1. Essential hypertension---hypertensive stress test.  - Blood pressure has been stable so will continue on current medication. Will check routine labs. - lisinopril (PRINIVIL;ZESTRIL) 5 MG tablet; Take 1 tablet by mouth daily  Dispense: 90 tablet; Refill: 1  - Lipid Panel; Future  - Basic Metabolic Panel; Future      Return in about 6 months (around 9/5/2021) for hypertension. Terry Foss is a 56 y.o. male being evaluated by a Virtual Visit (video visit) encounter to address concerns as mentioned above. A caregiver was present when appropriate. Due to this being a TeleHealth encounter (During IYZVW-89 public health emergency), evaluation of the following organ systems was limited: Vitals/Constitutional/EENT/Resp/CV/GI//MS/Neuro/Skin/Heme-Lymph-Imm. Pursuant to the emergency declaration under the [address] [place] [address] and the [employer] and [employer] Act, this Virtual Visit was conducted with patient's (and/or legal guardian's) consent, to reduce the patient's risk of exposure to COVID-19 and provide necessary medical care. The patient (and/or legal guardian) has also been advised to contact this office for worsening conditions or problems, and seek emergency medical treatment and/or call 911 if deemed necessary. Patient identification was verified at the start of the visit: Yes    Services were provided through a video synchronous discussion virtually to substitute for in-person clinic visit. Patient and provider were located at their individual homes. --Suzie Culp MD on 3/5/2021 at 3:04 PM    An electronic signature was used to authenticate this note.

## 2021-03-05 ENCOUNTER — VIRTUAL VISIT (OUTPATIENT)
Dept: FAMILY MEDICINE CLINIC | Age: 61
End: 2021-03-05
Payer: COMMERCIAL

## 2021-03-05 DIAGNOSIS — I10 ESSENTIAL HYPERTENSION: Chronic | ICD-10-CM

## 2021-03-05 PROCEDURE — 99213 OFFICE O/P EST LOW 20 MIN: CPT | Performed by: FAMILY MEDICINE

## 2021-03-05 RX ORDER — MELOXICAM 15 MG/1
15 TABLET ORAL DAILY
Qty: 90 TABLET | Refills: 1 | Status: SHIPPED | OUTPATIENT
Start: 2021-03-05 | End: 2021-10-21 | Stop reason: SDUPTHER

## 2021-03-05 RX ORDER — LISINOPRIL 5 MG/1
5 TABLET ORAL DAILY
Qty: 90 TABLET | Refills: 1 | Status: SHIPPED | OUTPATIENT
Start: 2021-03-05 | Stop reason: SDUPTHER

## 2021-03-09 ENCOUNTER — PREP FOR PROCEDURE (OUTPATIENT)
Dept: SURGERY | Age: 61
End: 2021-03-09

## 2021-03-09 ENCOUNTER — TELEPHONE (OUTPATIENT)
Dept: SURGERY | Age: 61
End: 2021-03-09

## 2021-03-09 NOTE — TELEPHONE ENCOUNTER
Pt called in wanting to get surgery scheduled end of March. Surgery scheduled 3/23, arrive at 6:45 am, NPO after midnight, bring a , shower with antibacterial soap morning of surgery, no jewelry or piercings. Asked pt to get labs & COVID testing done 5-7 days prior.     Pt voiced understanding

## 2021-03-18 ENCOUNTER — HOSPITAL ENCOUNTER (OUTPATIENT)
Age: 61
Discharge: HOME | End: 2021-03-18
Payer: COMMERCIAL

## 2021-03-18 DIAGNOSIS — R10.32 LEFT GROIN PAIN: ICD-10-CM

## 2021-03-18 DIAGNOSIS — Z87.19 S/P LEFT INGUINAL HERNIA REPAIR: ICD-10-CM

## 2021-03-18 DIAGNOSIS — Z01.818 PRE-OP TESTING: ICD-10-CM

## 2021-03-18 DIAGNOSIS — Z98.890 S/P LEFT INGUINAL HERNIA REPAIR: ICD-10-CM

## 2021-03-18 DIAGNOSIS — I10 ESSENTIAL HYPERTENSION: ICD-10-CM

## 2021-03-18 DIAGNOSIS — I10 ESSENTIAL HYPERTENSION: Chronic | ICD-10-CM

## 2021-03-18 LAB
ANION GAP SERPL CALCULATED.3IONS-SCNC: 8 MEQ/L (ref 8–16)
BUN BLDV-MCNC: 21 MG/DL (ref 7–22)
CALCIUM SERPL-MCNC: 9.5 MG/DL (ref 8.5–10.5)
CHLORIDE BLD-SCNC: 102 MEQ/L (ref 98–111)
CHOLESTEROL, TOTAL: 133 MG/DL (ref 100–199)
CO2: 26 MEQ/L (ref 23–33)
CREAT SERPL-MCNC: 1 MG/DL (ref 0.4–1.2)
GFR SERPL CREATININE-BSD FRML MDRD: 75 ML/MIN/1.73M2
GLUCOSE BLD-MCNC: 101 MG/DL (ref 70–108)
HCT VFR BLD CALC: 47.4 % (ref 42–52)
HDLC SERPL-MCNC: 44 MG/DL
HEMOGLOBIN: 15 GM/DL (ref 14–18)
LDL CHOLESTEROL CALCULATED: 80 MG/DL
POTASSIUM SERPL-SCNC: 4.8 MEQ/L (ref 3.5–5.2)
SODIUM BLD-SCNC: 136 MEQ/L (ref 135–145)
TRIGL SERPL-MCNC: 46 MG/DL (ref 0–199)

## 2021-03-18 PROCEDURE — 85018 HEMOGLOBIN: CPT

## 2021-03-18 PROCEDURE — U0003 INFECTIOUS AGENT DETECTION BY NUCLEIC ACID (DNA OR RNA); SEVERE ACUTE RESPIRATORY SYNDROME CORONAVIRUS 2 (SARS-COV-2) (CORONAVIRUS DISEASE [COVID-19]), AMPLIFIED PROBE TECHNIQUE, MAKING USE OF HIGH THROUGHPUT TECHNOLOGIES AS DESCRIBED BY CMS-2020-01-R: HCPCS

## 2021-03-18 PROCEDURE — 36415 COLL VENOUS BLD VENIPUNCTURE: CPT

## 2021-03-18 PROCEDURE — 80061 LIPID PANEL: CPT

## 2021-03-18 PROCEDURE — 80048 BASIC METABOLIC PNL TOTAL CA: CPT

## 2021-03-18 PROCEDURE — 85014 HEMATOCRIT: CPT

## 2021-03-20 LAB — SARS-COV-2: NOT DETECTED

## 2021-03-20 NOTE — H&P
Katarina Duff (: 1960)   ASSESSMENT:   1. Recurrent left inguinal hernia   PLAN:   1. The pros and cons of recurrent left inguinal hernia repair with possible mesh was discussed with patient. He states he would like to think about what he would like to do next prior to scheduling any surgical intervention. Discussed with him about surgery, observation and second opinion. All questions answered. He states he will call back when he is ready to schedule recurrent left inguinal hernia repair with mesh. 2. Restrictions discussed with patient. All questions answered. 3. Follow-up if/when ready to proceed with repair. 4. Signs and symptoms reviewed with patient that would be concerning and needHe to return to office for re-evaluation. Patient states He will call if He has questions or concerns. SUBJECTIVE/OBJECTIVE:        Chief Complaint   Patient presents with    Surgical Consult     Est patient-last seen in the office 2020-s/p Robotic repair of recurrent bilateral inguinal hernias with mesh 20-having pain in the left groin-CT done on 2020-discuss surgery     HPI   Gopi Floresita is a 58-year-old male who presents with left groin discomfort and a bulge. He unfortunately underwent a robotic recurrent left inguinal hernia repair 2020. Denies any issues on the right side. Left side he has pain and discomfort. He states it really has not changed much since just after surgery. Some swelling there but not an obvious bulge. Previous CT imaging demonstrated a left inguinal recurrent hernia with some fat within the canal as well as scar tissue. Pain is mild to moderate. Worse with certain positions and palpation. Denies doing anything real physically active as he has hurt his hand on the left side in the past preventing him from lifting much. No change in bowel function. No hematochezia or melena. No new urinary complaints. Tolerating diet.  Discomfort does radiate down towards the left scrotal/testicular region. No issues on the right side. No chest pain or shortness of breath. No lightheadedness or dizziness. Review of Systems   Constitutional: Negative for activity change, appetite change, chills, diaphoresis, fatigue, fever and unexpected weight change. HENT: Positive for hearing loss. Negative for congestion, dental problem, drooling, ear discharge, ear pain, facial swelling, mouth sores, nosebleeds, postnasal drip, rhinorrhea, sinus pressure, sneezing, sore throat, tinnitus, trouble swallowing and voice change. Eyes: Negative for photophobia, pain, discharge, redness, itching and visual disturbance. Respiratory: Negative for apnea, cough, choking, chest tightness, shortness of breath, wheezing and stridor. Cardiovascular: Negative for chest pain, palpitations and leg swelling. Gastrointestinal: Negative for abdominal distention, abdominal pain, anal bleeding, blood in stool, constipation, diarrhea, nausea, rectal pain and vomiting. Endocrine: Negative. Genitourinary: Positive for testicular pain. Negative for decreased urine volume, difficulty urinating, discharge, dysuria, enuresis, flank pain, frequency, genital sores, hematuria, penile pain, penile swelling, scrotal swelling and urgency. Musculoskeletal: Positive for arthralgias. Negative for back pain, gait problem, joint swelling, myalgias, neck pain and neck stiffness. Skin: Negative for color change, pallor, rash and wound. Allergic/Immunologic: Negative. Neurological: Negative for dizziness, tremors, seizures, syncope, facial asymmetry, speech difficulty, weakness, light-headedness, numbness and headaches. Hematological: Negative for adenopathy. Does not bruise/bleed easily. Psychiatric/Behavioral: Negative for agitation, behavioral problems, confusion, decreased concentration, dysphoric mood, hallucinations, self-injury, sleep disturbance and suicidal ideas.  The patient is not nervous/anxious and is not hyperactive. Past Medical History        Past Medical History:   Diagnosis Date    Chronic back pain     Cough     patient is a  has a chronic cough    Essential hypertension 04/01/2016    GERD (gastroesophageal reflux disease)     Osteoarthritis     Stomach ulcer      Past Surgical History         Past Surgical History:   Procedure Laterality Date    ARM SURGERY Left 03/2020    [facility]-Dr. Yudi Coon    [facility]  2019    [facility]    EGD  2019    [facility]    HERNIA REPAIR      child    HERNIA REPAIR  01/03/2012    [facility]-left indirect reducible inguinal hernia with mesh    HERNIA REPAIR Bilateral 04/28/2020    ROBOTIC LEFT and Right INGUINAL REPAIR WITH MESH performed by Otis [facility]ari [facility] at [facility]  2005 x2    left-Dr. Melrose Snellen    TOOTH EXTRACTION  1997    VASECTOMY  2005    WRIST SURGERY  12/27/2018    left wirst      Current Facility-Administered Medications          Current Outpatient Medications   Medication Sig Dispense Refill    lisinopril (PRINIVIL;ZESTRIL) 5 MG tablet Take 1 tablet by mouth daily 90 tablet 3    meloxicam (MOBIC) 15 MG tablet Take 1 tablet by mouth daily 90 tablet 3    ibuprofen (ADVIL;MOTRIN) 200 MG tablet Take 600 mg by mouth every 6 hours as needed for Pain      No current facility-administered medications for this visit.             Allergies   Allergen Reactions    Sulfa Antibiotics Hives     Family History         Family History   Problem Relation Age of Onset    Arthritis Mother     Heart Disease Mother     High Blood Pressure Mother     Arthritis Father     Cancer Father     prostate    Diabetes Father     Heart Disease Father     High Blood Pressure Father     Colon Cancer Father 80    Heart Disease Sister     High Blood Pressure Sister     Learning Disabilities Sister     Learning Disabilities Brother     High Blood Pressure Brother     Heart Disease Brother     Arthritis Brother     Cancer Brother     prostate Social History         Socioeconomic History    Marital status:      Spouse name: Tanya Bernal Number of children: 9    Years of education: 9    Highest education level: GED or equivalent   Occupational History    Occupation:      Employer: [employer] resource strain: Somewhat hard    Food insecurity     Worry: Never true     Inability: Never true    Transportation needs     Medical: No     Non-medical: No   Tobacco Use    Smoking status: Current Every Day Smoker     Packs/day: 0.50     Years: 40.00     Pack years: 20.00     Types: Cigarettes    Smokeless tobacco: Never Used    Tobacco comment: trying to cut down   Substance and Sexual Activity    Alcohol use: No     Alcohol/week: 0.0 standard drinks     Comment: occasionally    Drug use: No    Sexual activity: Yes   Lifestyle    Physical activity     Days per week: Not on file     Minutes per session: Not on file    Stress: Not on file   Relationships    Social connections     Talks on phone: Not on file     Gets together: Not on file     Attends Confucianist service: Not on file     Active member of club or organization: Not on file     Attends meetings of clubs or organizations: Not on file     Relationship status: Not on file    Intimate partner violence     Fear of current or ex partner: Not on file     Emotionally abused: Not on file     Physically abused: Not on file     Forced sexual activity: Not on file   Other Topics Concern    Not on file   Social History Narrative    Not on file     Vitals       Vitals:    02/24/21 1022   BP: 118/60   Site: Right Upper Arm   Position: Sitting   Cuff Size: Medium Adult   Pulse: 93   Resp: 18   Temp: 95.6 °F (35.3 °C)   TempSrc: Temporal   SpO2: 95%   Weight: 157 lb 9.6 oz (71.5 kg)   Height: 5' 4\" (1.626 m)   Body mass index is 27.05 kg/m².        Wt Readings from Last 3 Encounters:   02/24/21 157 lb 9.6 oz (71.5 kg)   06/24/20 152 lb (68.9 kg) 05/11/20 152 lb (68.9 kg)     Physical Exam   Vitals signs reviewed. Constitutional:   General: He is not in acute distress. Appearance: He is well-developed. He is not diaphoretic. HENT:   Head: Normocephalic and atraumatic. Right Ear: External ear normal.   Left Ear: External ear normal.   Nose: Nose normal.   Eyes:   General: No scleral icterus. Right eye: No discharge. Left eye: No discharge. Conjunctiva/sclera: Conjunctivae normal.   Neck:   Musculoskeletal: Normal range of motion and neck supple. Cardiovascular:   Rate and Rhythm: Normal rate and regular rhythm. Heart sounds: Normal heart sounds. Pulmonary:   Effort: Pulmonary effort is normal. No respiratory distress. Breath sounds: Normal breath sounds. No wheezing or rales. Chest:   Chest wall: No tenderness. Abdominal:   General: Bowel sounds are normal. There is no distension. Palpations: Abdomen is soft. There is no mass. Tenderness: There is no abdominal tenderness. There is no guarding or rebound. Hernia: A hernia is present. Hernia is present in the left inguinal area. Musculoskeletal: Normal range of motion. General: No tenderness. Skin:   General: Skin is warm and dry. Coloration: Skin is not pale. Findings: No erythema or rash. Neurological:   Mental Status: He is alert and oriented to person, place, and time. Cranial Nerves: No cranial nerve deficit. Psychiatric:   Behavior: Behavior normal.   Thought Content:  Thought content normal.   Judgment: Judgment normal.           Lab Results   Component Value Date     03/03/2020    K 4.6 04/28/2020     03/03/2020    CO2 21 (L) 03/03/2020           Lab Results   Component Value Date    CREATININE 1.0 07/06/2020           Lab Results   Component Value Date    WBC 6.7 03/03/2020    HGB 13.8 (L) 03/03/2020    HCT 43.2 03/03/2020    MCV 89.1 03/03/2020     03/03/2020     Imaging -   Narrative   PROCEDURE: CT ABDOMEN PELVIS W IV CONTRAST CLINICAL INFORMATION: Left groin pain, history of inguinal hernia repairs      TECHNIQUE: CT of the abdomen and pelvis was performed following administration of oral and 100 mL Isovue-370 intravenous contrast. Axial images as well as coronal and sagittal reconstructions were obtained. All CT scans at this facility use dose modulation, iterative reconstruction, and/or weight-based dosing when appropriate to reduce radiation dose to as low as reasonably achievable. COMPARISON: CT abdomen and pelvis 12/17/2012      FINDINGS:       Lower thorax: There is minimal scarring at the bilateral lung bases. No pleural effusion is identified. Abdomen: There is no free intraperitoneal air or fluid. Bowel is normal in course and caliber without evidence of obstruction. There are diverticula throughout the colon without evidence of diverticulitis. The gallbladder is contracted. Diffuse    hypoattenuation in the liver may be secondary to hepatic steatosis. The pancreas, spleen, adrenal glands and kidneys are normal. Atherosclerotic calcifications are seen in the abdominal aorta without evidence of aneurysm. There is no mesenteric or    retroperitoneal lymphadenopathy. Degenerative changes are present in the lumbar spine without evidence of aggressive osseous lesions. Pelvis: Urinary bladder is unremarkable. Phleboliths are noted in the pelvis. There is no pelvic or inguinal lymphadenopathy. There is a small left inguinal hernia which contains fat and likely scar tissue. Degenerative changes are present in the pelvis    without evidence of aggressive osseous lesions. Impression   1. No acute intra-abdominal or intrapelvic findings. 2. Uncomplicated pancolonic diverticulosis. 3. Small left-sided inguinal hernia containing fat and scar tissue.       Final report electronically signed by Dr. Thierno Sandoval on 7/6/2020 4:51 PM         Patient Active Problem List   Diagnosis    Bilateral inguinal hernia  Tobacco use disorder    Essential hypertension---hypertensive stress test.    Urinary retention     An electronic signature was used to authenticate this note.    --Talon Ann MD

## 2021-03-22 NOTE — PROGRESS NOTES
Patient contacted regarding COVID-19 screen. Test was done on 3/18 at   Following questions asked: In the last month, have you been in contact with someone who was confirmed or suspected to have Coronavirus/COVID-19:  Patient stated NO      Pt was informed can be a visitor allowed. Please bring masks. Do you or family members have any of the following symptoms:  Cough-no   Muscle pain-no   Shortness of breath-no   Fever-no   Weakness-no  Severe headache-no   Sore throat-no   Respiratory symptoms-no    Have you traveled internationally in the last month?  No     Have you been to the emergency room recently-no

## 2021-03-23 ENCOUNTER — ANESTHESIA EVENT (OUTPATIENT)
Dept: OPERATING ROOM | Age: 61
End: 2021-03-23
Payer: COMMERCIAL

## 2021-03-23 ENCOUNTER — HOSPITAL ENCOUNTER (OUTPATIENT)
Age: 61
Setting detail: OUTPATIENT SURGERY
Discharge: HOME | End: 2021-03-23
Attending: SURGERY | Admitting: SURGERY
Payer: COMMERCIAL

## 2021-03-23 ENCOUNTER — ANESTHESIA (OUTPATIENT)
Dept: OPERATING ROOM | Age: 61
End: 2021-03-23
Payer: COMMERCIAL

## 2021-03-23 VITALS
DIASTOLIC BLOOD PRESSURE: 78 MMHG | TEMPERATURE: 96.8 F | SYSTOLIC BLOOD PRESSURE: 116 MMHG | OXYGEN SATURATION: 95 % | RESPIRATION RATE: 12 BRPM | HEART RATE: 65 BPM

## 2021-03-23 VITALS — SYSTOLIC BLOOD PRESSURE: 87 MMHG | OXYGEN SATURATION: 99 % | DIASTOLIC BLOOD PRESSURE: 42 MMHG

## 2021-03-23 DIAGNOSIS — K40.91 RECURRENT LEFT INGUINAL HERNIA: Primary | ICD-10-CM

## 2021-03-23 LAB — POTASSIUM SERPL-SCNC: 5.1 MEQ/L (ref 3.5–5.2)

## 2021-03-23 PROCEDURE — 3700000000 HC ANESTHESIA ATTENDED CARE: Performed by: SURGERY

## 2021-03-23 PROCEDURE — 2500000003 HC RX 250 WO HCPCS: Performed by: NURSE ANESTHETIST, CERTIFIED REGISTERED

## 2021-03-23 PROCEDURE — 7100000011 HC PHASE II RECOVERY - ADDTL 15 MIN: Performed by: SURGERY

## 2021-03-23 PROCEDURE — 3700000001 HC ADD 15 MINUTES (ANESTHESIA): Performed by: SURGERY

## 2021-03-23 PROCEDURE — 49520 REREPAIR ING HERNIA REDUCE: CPT | Performed by: SURGERY

## 2021-03-23 PROCEDURE — 7100000000 HC PACU RECOVERY - FIRST 15 MIN: Performed by: SURGERY

## 2021-03-23 PROCEDURE — 36415 COLL VENOUS BLD VENIPUNCTURE: CPT

## 2021-03-23 PROCEDURE — 6360000002 HC RX W HCPCS: Performed by: ANESTHESIOLOGY

## 2021-03-23 PROCEDURE — 3600000002 HC SURGERY LEVEL 2 BASE: Performed by: SURGERY

## 2021-03-23 PROCEDURE — 6360000002 HC RX W HCPCS

## 2021-03-23 PROCEDURE — 7100000001 HC PACU RECOVERY - ADDTL 15 MIN: Performed by: SURGERY

## 2021-03-23 PROCEDURE — 2580000003 HC RX 258: Performed by: SURGERY

## 2021-03-23 PROCEDURE — 84132 ASSAY OF SERUM POTASSIUM: CPT

## 2021-03-23 PROCEDURE — 2500000003 HC RX 250 WO HCPCS: Performed by: SURGERY

## 2021-03-23 PROCEDURE — 2709999900 HC NON-CHARGEABLE SUPPLY: Performed by: SURGERY

## 2021-03-23 PROCEDURE — 6360000002 HC RX W HCPCS: Performed by: NURSE ANESTHETIST, CERTIFIED REGISTERED

## 2021-03-23 PROCEDURE — 3600000012 HC SURGERY LEVEL 2 ADDTL 15MIN: Performed by: SURGERY

## 2021-03-23 PROCEDURE — 7100000010 HC PHASE II RECOVERY - FIRST 15 MIN: Performed by: SURGERY

## 2021-03-23 PROCEDURE — C1781 MESH (IMPLANTABLE): HCPCS | Performed by: SURGERY

## 2021-03-23 DEVICE — MESH HERN W1XL4IN INGUINAL POLYPR MFIL RECTANG: Type: IMPLANTABLE DEVICE | Site: GROIN | Status: FUNCTIONAL

## 2021-03-23 RX ORDER — PROMETHAZINE HYDROCHLORIDE 25 MG/1
12.5 TABLET ORAL EVERY 6 HOURS PRN
Status: DISCONTINUED | OUTPATIENT
Start: 2021-03-23 | End: 2021-03-23 | Stop reason: HOSPADM

## 2021-03-23 RX ORDER — FENTANYL CITRATE 50 UG/ML
50 INJECTION, SOLUTION INTRAMUSCULAR; INTRAVENOUS EVERY 5 MIN PRN
Status: DISCONTINUED | OUTPATIENT
Start: 2021-03-23 | End: 2021-03-23 | Stop reason: HOSPADM

## 2021-03-23 RX ORDER — FENTANYL CITRATE 50 UG/ML
INJECTION, SOLUTION INTRAMUSCULAR; INTRAVENOUS PRN
Status: DISCONTINUED | OUTPATIENT
Start: 2021-03-23 | End: 2021-03-23 | Stop reason: SDUPTHER

## 2021-03-23 RX ORDER — SODIUM CHLORIDE 0.9 % (FLUSH) 0.9 %
10 SYRINGE (ML) INJECTION PRN
Status: DISCONTINUED | OUTPATIENT
Start: 2021-03-23 | End: 2021-03-23 | Stop reason: HOSPADM

## 2021-03-23 RX ORDER — BUPIVACAINE HYDROCHLORIDE 5 MG/ML
INJECTION, SOLUTION EPIDURAL; INTRACAUDAL PRN
Status: DISCONTINUED | OUTPATIENT
Start: 2021-03-23 | End: 2021-03-23 | Stop reason: ALTCHOICE

## 2021-03-23 RX ORDER — MEPERIDINE HYDROCHLORIDE 25 MG/ML
12.5 INJECTION INTRAMUSCULAR; INTRAVENOUS; SUBCUTANEOUS EVERY 5 MIN PRN
Status: DISCONTINUED | OUTPATIENT
Start: 2021-03-23 | End: 2021-03-23 | Stop reason: HOSPADM

## 2021-03-23 RX ORDER — MORPHINE SULFATE 2 MG/ML
2 INJECTION, SOLUTION INTRAMUSCULAR; INTRAVENOUS
Status: DISCONTINUED | OUTPATIENT
Start: 2021-03-23 | End: 2021-03-23 | Stop reason: HOSPADM

## 2021-03-23 RX ORDER — ONDANSETRON 2 MG/ML
4 INJECTION INTRAMUSCULAR; INTRAVENOUS EVERY 6 HOURS PRN
Status: DISCONTINUED | OUTPATIENT
Start: 2021-03-23 | End: 2021-03-23 | Stop reason: HOSPADM

## 2021-03-23 RX ORDER — SODIUM CHLORIDE 9 MG/ML
INJECTION, SOLUTION INTRAVENOUS CONTINUOUS
Status: DISCONTINUED | OUTPATIENT
Start: 2021-03-23 | End: 2021-03-23 | Stop reason: HOSPADM

## 2021-03-23 RX ORDER — PROPOFOL 10 MG/ML
INJECTION, EMULSION INTRAVENOUS PRN
Status: DISCONTINUED | OUTPATIENT
Start: 2021-03-23 | End: 2021-03-23 | Stop reason: SDUPTHER

## 2021-03-23 RX ORDER — KETOROLAC TROMETHAMINE 30 MG/ML
INJECTION, SOLUTION INTRAMUSCULAR; INTRAVENOUS
Status: COMPLETED
Start: 2021-03-23 | End: 2021-03-23

## 2021-03-23 RX ORDER — ONDANSETRON 2 MG/ML
4 INJECTION INTRAMUSCULAR; INTRAVENOUS
Status: DISCONTINUED | OUTPATIENT
Start: 2021-03-23 | End: 2021-03-23 | Stop reason: HOSPADM

## 2021-03-23 RX ORDER — SODIUM CHLORIDE 0.9 % (FLUSH) 0.9 %
10 SYRINGE (ML) INJECTION EVERY 12 HOURS SCHEDULED
Status: DISCONTINUED | OUTPATIENT
Start: 2021-03-23 | End: 2021-03-23 | Stop reason: HOSPADM

## 2021-03-23 RX ORDER — LABETALOL 20 MG/4 ML (5 MG/ML) INTRAVENOUS SYRINGE
5 EVERY 10 MIN PRN
Status: DISCONTINUED | OUTPATIENT
Start: 2021-03-23 | End: 2021-03-23 | Stop reason: HOSPADM

## 2021-03-23 RX ORDER — LIDOCAINE HCL/PF 100 MG/5ML
SYRINGE (ML) INJECTION PRN
Status: DISCONTINUED | OUTPATIENT
Start: 2021-03-23 | End: 2021-03-23 | Stop reason: SDUPTHER

## 2021-03-23 RX ORDER — KETOROLAC TROMETHAMINE 10 MG/1
10 TABLET, FILM COATED ORAL EVERY 8 HOURS PRN
Qty: 15 TABLET | Refills: 0 | Status: SHIPPED | OUTPATIENT
Start: 2021-03-23 | End: 2021-04-05 | Stop reason: ALTCHOICE

## 2021-03-23 RX ORDER — MORPHINE SULFATE 2 MG/ML
4 INJECTION, SOLUTION INTRAMUSCULAR; INTRAVENOUS
Status: DISCONTINUED | OUTPATIENT
Start: 2021-03-23 | End: 2021-03-23 | Stop reason: HOSPADM

## 2021-03-23 RX ORDER — GLYCOPYRROLATE 1 MG/5 ML
SYRINGE (ML) INTRAVENOUS PRN
Status: DISCONTINUED | OUTPATIENT
Start: 2021-03-23 | End: 2021-03-23 | Stop reason: SDUPTHER

## 2021-03-23 RX ORDER — DEXAMETHASONE SODIUM PHOSPHATE 10 MG/ML
INJECTION, EMULSION INTRAMUSCULAR; INTRAVENOUS PRN
Status: DISCONTINUED | OUTPATIENT
Start: 2021-03-23 | End: 2021-03-23 | Stop reason: SDUPTHER

## 2021-03-23 RX ORDER — ONDANSETRON 2 MG/ML
INJECTION INTRAMUSCULAR; INTRAVENOUS PRN
Status: DISCONTINUED | OUTPATIENT
Start: 2021-03-23 | End: 2021-03-23 | Stop reason: SDUPTHER

## 2021-03-23 RX ORDER — HYDROCODONE BITARTRATE AND ACETAMINOPHEN 5; 325 MG/1; MG/1
1-2 TABLET ORAL EVERY 6 HOURS PRN
Qty: 25 TABLET | Refills: 0 | Status: SHIPPED | OUTPATIENT
Start: 2021-03-23 | End: 2021-03-26

## 2021-03-23 RX ADMIN — SODIUM CHLORIDE: 9 INJECTION, SOLUTION INTRAVENOUS at 10:58

## 2021-03-23 RX ADMIN — PROPOFOL 200 MG: 10 INJECTION, EMULSION INTRAVENOUS at 09:20

## 2021-03-23 RX ADMIN — PHENYLEPHRINE HYDROCHLORIDE 100 MCG: 10 INJECTION INTRAVENOUS at 09:53

## 2021-03-23 RX ADMIN — KETOROLAC TROMETHAMINE 30 MG: 30 INJECTION, SOLUTION INTRAMUSCULAR; INTRAVENOUS at 10:50

## 2021-03-23 RX ADMIN — FENTANYL CITRATE 50 MCG: 50 INJECTION, SOLUTION INTRAMUSCULAR; INTRAVENOUS at 10:50

## 2021-03-23 RX ADMIN — Medication 80 MG: at 09:19

## 2021-03-23 RX ADMIN — ONDANSETRON HYDROCHLORIDE 4 MG: 4 INJECTION, SOLUTION INTRAMUSCULAR; INTRAVENOUS at 09:37

## 2021-03-23 RX ADMIN — PHENYLEPHRINE HYDROCHLORIDE 100 MCG: 10 INJECTION INTRAVENOUS at 09:31

## 2021-03-23 RX ADMIN — Medication 0.2 MG: at 09:27

## 2021-03-23 RX ADMIN — Medication 0.2 MG: at 09:31

## 2021-03-23 RX ADMIN — PHENYLEPHRINE HYDROCHLORIDE 50 MCG: 10 INJECTION INTRAVENOUS at 09:25

## 2021-03-23 RX ADMIN — DEXAMETHASONE SODIUM PHOSPHATE 10 MG: 10 INJECTION, EMULSION INTRAMUSCULAR; INTRAVENOUS at 09:32

## 2021-03-23 RX ADMIN — SODIUM CHLORIDE: 9 INJECTION, SOLUTION INTRAVENOUS at 08:10

## 2021-03-23 RX ADMIN — CEFAZOLIN 2 G: 10 INJECTION, POWDER, FOR SOLUTION INTRAVENOUS at 09:24

## 2021-03-23 RX ADMIN — FENTANYL CITRATE 50 MCG: 50 INJECTION, SOLUTION INTRAMUSCULAR; INTRAVENOUS at 09:52

## 2021-03-23 RX ADMIN — HYDROMORPHONE HYDROCHLORIDE 1 MG: 1 INJECTION, SOLUTION INTRAMUSCULAR; INTRAVENOUS; SUBCUTANEOUS at 10:50

## 2021-03-23 RX ADMIN — FENTANYL CITRATE 50 MCG: 50 INJECTION, SOLUTION INTRAMUSCULAR; INTRAVENOUS at 09:17

## 2021-03-23 RX ADMIN — PHENYLEPHRINE HYDROCHLORIDE 100 MCG: 10 INJECTION INTRAVENOUS at 10:04

## 2021-03-23 ASSESSMENT — PAIN SCALES - GENERAL
PAINLEVEL_OUTOF10: 2
PAINLEVEL_OUTOF10: 2
PAINLEVEL_OUTOF10: 6

## 2021-03-23 ASSESSMENT — PULMONARY FUNCTION TESTS
PIF_VALUE: 0
PIF_VALUE: 2
PIF_VALUE: 15
PIF_VALUE: 15
PIF_VALUE: 0
PIF_VALUE: 3
PIF_VALUE: 4
PIF_VALUE: 13
PIF_VALUE: 15
PIF_VALUE: 14
PIF_VALUE: 12
PIF_VALUE: 14
PIF_VALUE: 14
PIF_VALUE: 3
PIF_VALUE: 15
PIF_VALUE: 1
PIF_VALUE: 15
PIF_VALUE: 3
PIF_VALUE: 13
PIF_VALUE: 15
PIF_VALUE: 0
PIF_VALUE: 3
PIF_VALUE: 3
PIF_VALUE: 15
PIF_VALUE: 0
PIF_VALUE: 15
PIF_VALUE: 15
PIF_VALUE: 3
PIF_VALUE: 15
PIF_VALUE: 13
PIF_VALUE: 15
PIF_VALUE: 14
PIF_VALUE: 3
PIF_VALUE: 15
PIF_VALUE: 14
PIF_VALUE: 0
PIF_VALUE: 0
PIF_VALUE: 15
PIF_VALUE: 3

## 2021-03-23 ASSESSMENT — PAIN DESCRIPTION - ORIENTATION: ORIENTATION: LEFT

## 2021-03-23 ASSESSMENT — PAIN DESCRIPTION - PROGRESSION: CLINICAL_PROGRESSION: GRADUALLY IMPROVING

## 2021-03-23 NOTE — ANESTHESIA POSTPROCEDURE EVALUATION
Department of Anesthesiology  Postprocedure Note    Patient: Harini Go  MRN: [id number]  YOB: 1960  Date of evaluation: 3/23/2021  Time:  12:13 PM     Procedure Summary     Date: 03/23/21 Room / Location: [address]    Anesthesia Start: 0916 Anesthesia Stop: 1027    Procedure: RECURRENT LEFT INGUINAL HERNIA, REPAIR WITH MESH (Left Groin) Diagnosis: (RECURRENT LEFT INGUINAL HERNIA)    Surgeons: Crystal Stuart MD Responsible Provider: Shmuel Castaneda DO    Anesthesia Type: general ASA Status: 2          Anesthesia Type: general    Anna Phase I: Anna Score: 10    Anna Phase II: Anna Score: 10    Last vitals: Reviewed and per EMR flowsheets.        Anesthesia Post Evaluation    Patient location during evaluation: PACU  Patient participation: complete - patient participated  Level of consciousness: awake and alert  Pain score: 2  Airway patency: patent  Nausea & Vomiting: no nausea and no vomiting  Complications: no  Cardiovascular status: hemodynamically stable and blood pressure returned to baseline  Respiratory status: spontaneous ventilation, room air and acceptable  Hydration status: stable

## 2021-03-23 NOTE — BRIEF OP NOTE
Brief Postoperative Note      Patient: Florencio Allen  YOB: 1960  MRN: [id number]    Date of Procedure: 3/23/2021    Pre-Op Diagnosis: RECURRENT LEFT INGUINAL HERNIA    Post-Op Diagnosis: Same       Procedure(s):  RECURRENT LEFT INGUINAL HERNIA, REPAIR WITH MESH    Surgeon(s):  Jhony Ray MD    Assistant:  First Assistant: Mercedes Holder RN    Anesthesia: General/Local    Estimated Blood Loss (mL): 10 ml    Complications: None    Specimens:   * No specimens in log *    Implants:  Implant Name Type Inv.  Item Serial No.  Lot No. LRB No. Used Action   MESH TJ [id number] INGUINAL POLYPR MFIL [address] MFIL [place]  BARD DAVOL-WD [id number] Left 1 Implanted         Drains: * No LDAs found *    Findings: as above - see op note for details    Electronically signed by Jhony Ray MD on 3/23/2021 at 10:16 AM

## 2021-03-23 NOTE — ANESTHESIA PRE PROCEDURE
Department of Anesthesiology  Preprocedure Note       Name:  Yenifer Rosales   Age:  56 y.o.  :  1960                                          MRN:  [id number]         Date:  3/23/2021      Surgeon: Fran Hannah):  Jenny Hernandez MD    Procedure: Procedure(s):  RECURRENT LEFT INGUINAL HERNIA, REPAIR POSS MESH    Medications prior to admission:   Prior to Admission medications    Medication Sig Start Date End Date Taking? Authorizing Provider   lisinopril (PRINIVIL;ZESTRIL) 5 MG tablet Take 1 tablet by mouth daily 3/5/21  Yes Sumi Allen MD   meloxicam PASQUALESAI MARSH [facility]) 15 MG tablet Take 1 tablet by mouth daily 3/5/21  Yes Sumi Allen MD       Current medications:    Current Facility-Administered Medications   Medication Dose Route Frequency Provider Last Rate Last Admin    ceFAZolin (ANCEF) 2000 mg in dextrose 5 % 50 mL IVPB  2,000 mg Intravenous 30 Min Pre-Op Harriett Davies LPN        0.9 % sodium chloride infusion   Intravenous Continuous Jenny Hernandez  mL/hr at 21 0810 New Bag at 21 0810       Allergies:     Allergies   Allergen Reactions    Sulfa Antibiotics Hives       Problem List:    Patient Active Problem List   Diagnosis Code    Bilateral inguinal hernia K40.20    Tobacco use disorder F17.200    Essential hypertension---hypertensive stress test. I10    Urinary retention R33.9       Past Medical History:        Diagnosis Date    Chronic back pain     Cough     patient is a  has a chronic cough    Essential hypertension 2016    GERD (gastroesophageal reflux disease)     Osteoarthritis     Stomach ulcer        Past Surgical History:        Procedure Laterality Date    ARM SURGERY Left 2020    [facility]    [facility]    [facility]    HERNIA REPAIR      child    HERNIA REPAIR  2012    [facility]-left indirect reducible inguinal hernia with mesh    HERNIA REPAIR Bilateral 2020    ROBOTIC LEFT 04/12/2018    ALKPHOS 66 04/12/2018    AST 20 04/12/2018    ALT 27 04/12/2018       POC Tests: No results for input(s): POCGLU, POCNA, POCK, POCCL, POCBUN, POCHEMO, POCHCT in the last 72 hours. Coags:   Lab Results   Component Value Date    INR 1.01 04/12/2018    APTT 32.1 04/12/2018       HCG (If Applicable): No results found for: PREGTESTUR, PREGSERUM, HCG, HCGQUANT     ABGs: No results found for: PHART, PO2ART, FSW2GTW, HHM4YPC, BEART, B6ZLTXJN     Type & Screen (If Applicable):  No results found for: LABABO, LABRH    Drug/Infectious Status (If Applicable):  No results found for: HIV, HEPCAB    COVID-19 Screening (If Applicable):   Lab Results   Component Value Date    COVID19 Not Detected 03/18/2021           Anesthesia Evaluation  Patient summary reviewed and Nursing notes reviewed no history of anesthetic complications:   Airway: Mallampati: II  TM distance: >3 FB   Neck ROM: full  Mouth opening: > = 3 FB Dental:          Pulmonary:Negative Pulmonary ROS and normal exam  breath sounds clear to auscultation                             Cardiovascular:  Exercise tolerance: good (>4 METS),   (+) hypertension:,                   Neuro/Psych:   Negative Neuro/Psych ROS              GI/Hepatic/Renal:   (+) GERD:, PUD,           Endo/Other:              Pt had no PAT visit       Abdominal:           Vascular: negative vascular ROS. Anesthesia Plan      general     ASA 2       Induction: intravenous. MIPS: Postoperative opioids intended and Prophylactic antiemetics administered. Anesthetic plan and risks discussed with patient. Plan discussed with CRNA.                   Cuco Kwan,    3/23/2021

## 2021-03-24 ENCOUNTER — TELEPHONE (OUTPATIENT)
Dept: SURGERY | Age: 61
End: 2021-03-24

## 2021-03-24 NOTE — TELEPHONE ENCOUNTER
S/p 23 Repair of recurrent left inguinal hernia with  mesh.       Pt states he is doing well this morning. States that his incisions are clean, dry and intact. Pt states that he is using the bathroom with no issues- denies n/v or fevers. Advised to call the office with any questions or concerns.

## 2021-03-24 NOTE — OP NOTE
[address] [place] [address]                                OPERATIVE REPORT    PATIENT NAME: Mckenna Ricci                  :        1960  MED REC NO:   [id number]                           ROOM:  ACCOUNT NO:   [de-identified]                           ADMIT DATE: 2021  PROVIDER:     Rubi Youssef M.D.    DATE OF PROCEDURE:  2021    PREOPERATIVE DIAGNOSIS:  Recurrent left inguinal hernia. POSTOPERATIVE DIAGNOSIS:  Recurrent left inguinal hernia. OPERATION PERFORMED:  Repair of recurrent left inguinal hernia with  mesh. SURGEON:  Rubi Youssef MD    ASSISTANT:  Brayden Flynn. Smith, [facility]    ANESTHESIA:  General/local.    ESTIMATED BLOOD LOSS:  Less than 10 mL. DRAINS:  None. COMPLICATIONS:  None. DISPOSITION:  Stable to the recovery room. INDICATIONS:  The patient is a 78-year-old gentleman who unfortunately  has had what is felt to be a recurrent left inguinal hernia. He has had  the left side repaired both open and robotically in the past.  He has a  small defect medially with a bulge. Both operative and nonoperative  intervention plans were discussed. Risks of surgery were further  discussed. Some of the risks included, but were not limited to  bleeding, infection, the need for reoperation, severe chronic  postoperative pain or numbness, major vascular or nerve injury,  cardiopulmonary complications, anesthetic complications, seroma/hematoma  formation, wound breakdown, mesh infection requiring the removal of the  mesh, recurrence of the hernia, chronic groin pain, loss of testicle,  and death. After all of the questions were answered in their entirety  and the patient was completely aware of the current situation, he wished  to proceed with surgery.     DESCRIPTION OF PROCEDURE:  After informed consent was signed and placed  on the chart, the patient was taken back to the operating room and placed supine on the operating room table. General anesthesia was  induced. He tolerated this throughout the case. All pressure points  were padded. He was on preoperative antibiotics. Bilateral lower  extremity sequential compression devices were placed prior to incision. His left groin and scrotal regions were all prepped and draped in the  usual sterile standard fashion. A timeout occurred prior to the  operation which not only identified the patient, but also the planned  procedure to be performed. At the end of the timeout, there were no  questions or concerns. I began the operation by making an incision through the previous scar  there in the left groin with a 15-blade scalpel. Deepened through the  deep dermal and into the subcutaneous tissues plane. Jonathan's fascia  was dissected through. External oblique aponeurosis was felt to be  identified. This was sharply dissected through with its fibers using  Metzenbaum scissors. The cord structures were identified. Circumferentially dissected free and encircled with a Penrose drain. What was felt to be the ilioinguinal nerve was protected throughout the  rest of the operation. There was a lot of scar which was expected. The  indirect canal was identified and this overall looked pretty decent. Maybe slightly enlarged. No obvious large hernia defects were  identified. Medial to this, however, there did appear to be an opening  of the floor. This was felt mostly to be more of a direct component. Three-fourths of this bulge contained old mesh. This was felt to be the  old mesh from the previous robotic repair. This was very laxed at the  time. This was all dissected out. I actually dissected below the  inguinal ligament to further evaluate the femorals and there did not  appear to be any obvious defects at this point either. The laxity there  medially was then pushed in.   This was then reapproximated at the  indirect area laterally with 0 Ethibond suture. We really were unable  to reapproximate primarily more medially towards the pubic symphysis due  to tension with laxity from the bulge. The bulge was then pushed back  in there at the direct component and then, this was reinforced with  patch. This was tailored to fit for the pubic tubercle medially which  was oversewn with 0 Ethibond suture x2 and then inferiorly along the  inferior border of the inguinal ligament and then also superiorly along  the abdominal wall musculature. This was sewn nice and tight with the 0  Ethibond to keep the direct component inside. There was a lot of scar  tissue and not a lot of strength in any of his tissues unfortunately,  but the 0 Ethibond did appear to hold fairly well at the completion of  this. A small keyhole was made with the scissors and then, the cord  structures were placed in the keyhole and the keyhole sewn shut. Care  was taken to avoid any trauma. The patch flaps were then placed  laterally. Irrigation. Irrigant return was clear. Hemostasis was adequate. At  that point appeared to have entire reinforcement of the floor. The mesh  was nice and tied around the cord structures, but I did not feel there  was any real trauma. The external aponeurosis of the oblique muscle was  fairly attenuated and not a lot of strength on initial dissection and  even less so now, but this was then reapproximated the best of the  tissue's ability to hold with interrupted 3-0 Vicryl suture. Jonathan's  fascia was reapproximated with interrupted Vicryl suture. Skin was  reapproximated with a running 3-0 Vicryl in a subcuticular fashion. Closed incisions were then clean, dry, and Steri-Strips applied. Dry  sterile dressings were applied. Sponge, needle, and instrumentation  count was correct at the end of the procedure. The patient tolerated  the procedure well with no apparent complications and less than 10 mL  blood loss.   Brought out of general anesthesia and transferred to  postanesthesia care unit in stable condition.         Jj Martinez M.D.    D: 03/23/2021 10:16:28       T: 03/23/2021 12:21:15     NATALIYA_MARY ANN  Job#: [id number]     Doc#: [id number]    CC:

## 2021-04-05 ENCOUNTER — OFFICE VISIT (OUTPATIENT)
Dept: SURGERY | Age: 61
End: 2021-04-05

## 2021-04-05 VITALS
OXYGEN SATURATION: 95 % | SYSTOLIC BLOOD PRESSURE: 110 MMHG | BODY MASS INDEX: 26.63 KG/M2 | WEIGHT: 156 LBS | HEIGHT: 64 IN | TEMPERATURE: 96.8 F | DIASTOLIC BLOOD PRESSURE: 80 MMHG | HEART RATE: 90 BPM | RESPIRATION RATE: 18 BRPM

## 2021-04-05 DIAGNOSIS — Z87.19 S/P LEFT INGUINAL HERNIA REPAIR: ICD-10-CM

## 2021-04-05 DIAGNOSIS — Z98.890 S/P LEFT INGUINAL HERNIA REPAIR: ICD-10-CM

## 2021-04-05 PROCEDURE — 99024 POSTOP FOLLOW-UP VISIT: CPT | Performed by: NURSE PRACTITIONER

## 2021-04-05 NOTE — PROGRESS NOTES
Chronic problem. Well controlled on zoloft   performed by Gage Mathews MD at [address] [facility] ([facility]) Left 3/23/2021    RECURRENT LEFT INGUINAL HERNIA, REPAIR WITH MESH performed by Gage Mathews MD at [address] [facility]  2005 x2    left-[facility]    VASECTOMY  2005    WRIST SURGERY  12/27/2018    left wirst        Family History   Problem Relation Age of Onset    Arthritis Mother     Heart Disease Mother     High Blood Pressure Mother     Arthritis Father     Cancer Father         prostate    Diabetes Father     Heart Disease Father     High Blood Pressure Father     Colon Cancer Father 80    Heart Disease Sister     High Blood Pressure Sister     Learning Disabilities Sister     Learning Disabilities Brother     High Blood Pressure Brother     Heart Disease Brother     Arthritis Brother     Cancer Brother         prostate       Social History     Tobacco Use    Smoking status: Current Every Day Smoker     Packs/day: 0.50     Years: 40.00     Pack years: 20.00     Types: Cigarettes    Smokeless tobacco: Never Used    Tobacco comment: trying to cut down   Substance Use Topics    Alcohol use: Yes     Alcohol/week: 0.0 standard drinks     Comment: occasionally      Current Outpatient Medications   Medication Sig Dispense Refill    lisinopril (PRINIVIL;ZESTRIL) 5 MG tablet Take 1 tablet by mouth daily 90 tablet 1    meloxicam (MOBIC) 15 MG tablet Take 1 tablet by mouth daily 90 tablet 1     No current facility-administered medications for this visit. Allergies   Allergen Reactions    Sulfa Antibiotics Hives       Subjective:     Review of Systems   Constitutional: Negative for activity change, appetite change, chills, diaphoresis, fatigue, fever and unexpected weight change. HENT: Negative for congestion, dental problem, hearing loss, rhinorrhea, sinus pressure and sore throat. Eyes: Negative for photophobia, pain, discharge, itching and visual disturbance.    Respiratory: Negative for apnea, cough, choking, chest tightness, shortness of breath and wheezing. Cardiovascular: Negative for chest pain, palpitations and leg swelling. Gastrointestinal: Negative for abdominal distention, abdominal pain, anal bleeding, blood in stool, constipation, diarrhea, nausea and vomiting. Endocrine: Negative. Genitourinary: Negative for decreased urine volume, difficulty urinating, dysuria, frequency and urgency. Left groin discomfort   Musculoskeletal: Negative for arthralgias, back pain, gait problem, joint swelling, myalgias and neck pain. Skin: Negative for color change, pallor, rash and wound. Allergic/Immunologic: Negative. Neurological: Negative for dizziness, tremors, weakness, numbness and headaches. Hematological: Negative. Psychiatric/Behavioral: Negative. Objective:   /80 (Site: Right Upper Arm, Position: Sitting, Cuff Size: Medium Adult)   Pulse 90   Temp 96.8 °F (36 °C) (Temporal)   Resp 18   Ht 5' 4\" (1.626 m)   Wt 156 lb (70.8 kg)   SpO2 95%   BMI 26.78 kg/m²     Physical Exam  Vitals signs reviewed. Constitutional:       General: He is not in acute distress. Appearance: Normal appearance. He is well-developed. He is not ill-appearing or toxic-appearing. HENT:      Head: Normocephalic and atraumatic. Right Ear: Hearing and external ear normal.      Left Ear: Hearing and external ear normal.      Nose: Nose normal.      Mouth/Throat:      Mouth: Mucous membranes are not pale, not dry and not cyanotic. Eyes:      General: Lids are normal.   Neck:      Musculoskeletal: Normal range of motion and neck supple. Trachea: Trachea and phonation normal.   Cardiovascular:      Rate and Rhythm: Normal rate and regular rhythm. Pulses: Normal pulses. Heart sounds: S1 normal and S2 normal.   Pulmonary:      Effort: Pulmonary effort is normal. No tachypnea, bradypnea, accessory muscle usage or respiratory distress. resolved. Will continue to monitor and see how he progresses. If pain persists discussed nerve block in the future over the next 3+ months.       Electronically signed by FREDIS Salas CNP on 4/6/2021 at 2:45 PM

## 2021-04-06 PROBLEM — Z09 S/P RIGHT INGUINAL HERNIA REPAIR, FOLLOW-UP EXAM: Status: ACTIVE | Noted: 2021-04-06

## 2021-04-06 ASSESSMENT — ENCOUNTER SYMPTOMS
DIARRHEA: 0
VOMITING: 0
WHEEZING: 0
ABDOMINAL PAIN: 0
SHORTNESS OF BREATH: 0
ANAL BLEEDING: 0
ABDOMINAL DISTENTION: 0
CONSTIPATION: 0
NAUSEA: 0
EYE PAIN: 0
SINUS PRESSURE: 0
EYE ITCHING: 0
ALLERGIC/IMMUNOLOGIC NEGATIVE: 1
COLOR CHANGE: 0
BLOOD IN STOOL: 0
COUGH: 0
SORE THROAT: 0
BACK PAIN: 0
EYE DISCHARGE: 0
PHOTOPHOBIA: 0
CHOKING: 0
APNEA: 0
CHEST TIGHTNESS: 0
RHINORRHEA: 0

## 2021-04-20 NOTE — PROGRESS NOTES
[address] [facility] [address]  Dept: [phone]  Dept Fax: [phone]  Loc: [phone]    Visit Date: 4/21/2021    Duc Maher is a 59 y.o. male who presents today for:  Chief Complaint   Patient presents with    Post-Op Check     s/p Repair of recurrent left inguinal hernia with mesh-3/23/2021 Last seen in the office on 4/5/2021       HPI:     MICK Watts is a 57-year old male patient who presents today for follow up status post repair of recurrent left inguinal hernia 4 weeks ago. Patient states onoing left groin pain but states he cannot tell if it the same type of pain he had prior. Right now pain is still as expected postoperatively. He is up moving well. Most of his discomfort is incisional and increasing with movement or palpation. Does not radiate down testicle. Denies scrotal swelling. Off all narcotics and pain medications. Appetite back to normal. No nausea or vomiting. No fever, chills, or sweats. Incision healing well without signs of infection. Has left groin swelling and tenderness but minimal. Induration is improving. No issues urinating. Bowels are back to normal. No stool softener use. No SOB or chest pain. No lightheadedness or dizziness.      Past Medical History:   Diagnosis Date    Bilateral inguinal hernia 12/14/2011    Chronic back pain     Cough     patient is a  has a chronic cough    Essential hypertension 04/01/2016    GERD (gastroesophageal reflux disease)     Osteoarthritis     Recurrent left inguinal hernia     Stomach ulcer       Past Surgical History:   Procedure Laterality Date    ARM SURGERY Left 03/2020    [facility]    [facility]    EGD  2019    [facility]    HERNIA REPAIR      child    HERNIA REPAIR  01/03/2012    [place]-left indirect reducible inguinal hernia with mesh    HERNIA REPAIR Bilateral 04/28/2020    ROBOTIC LEFT and Right INGUINAL REPAIR WITH MESH performed by Helga Lerma MD at [address] [facility] Left 3/23/2021    RECURRENT LEFT INGUINAL HERNIA, REPAIR WITH MESH performed by Helga Lerma MD at [place] [address]  2005 x2    left- [facility]    VASECTOMY  2005    WRIST SURGERY  12/27/2018    left wirst        Family History   Problem Relation Age of Onset    Arthritis Mother     Heart Disease Mother     High Blood Pressure Mother     Arthritis Father     Cancer Father         prostate    Diabetes Father     Heart Disease Father     High Blood Pressure Father     Colon Cancer Father 80    Heart Disease Sister     High Blood Pressure Sister     Learning Disabilities Sister     Learning Disabilities Brother     High Blood Pressure Brother     Heart Disease Brother     Arthritis Brother     Cancer Brother         prostate       Social History     Tobacco Use    Smoking status: Current Every Day Smoker     Packs/day: 0.50     Years: 40.00     Pack years: 20.00     Types: Cigarettes    Smokeless tobacco: Never Used    Tobacco comment: trying to cut down   Substance Use Topics    Alcohol use: Yes     Alcohol/week: 0.0 standard drinks     Comment: occasionally      Current Outpatient Medications   Medication Sig Dispense Refill    lisinopril (PRINIVIL;ZESTRIL) 5 MG tablet Take 1 tablet by mouth daily 90 tablet 1    meloxicam (MOBIC) 15 MG tablet Take 1 tablet by mouth daily 90 tablet 1     No current facility-administered medications for this visit. Allergies   Allergen Reactions    Sulfa Antibiotics Hives       Subjective:     Review of Systems   Constitutional: Negative for activity change, appetite change, chills, diaphoresis, fatigue, fever and unexpected weight change. HENT: Negative for congestion, dental problem, hearing loss, rhinorrhea, sinus pressure and sore throat.     Eyes: Negative for photophobia, pain, discharge, itching and visual disturbance. Respiratory: Negative for apnea, cough, choking, chest tightness, shortness of breath and wheezing. Cardiovascular: Negative for chest pain, palpitations and leg swelling. Gastrointestinal: Negative for abdominal distention, abdominal pain, anal bleeding, blood in stool, constipation, diarrhea, nausea and vomiting. Endocrine: Negative. Genitourinary: Negative for decreased urine volume, difficulty urinating, dysuria, frequency and urgency. Left groin discomfort   Musculoskeletal: Negative for arthralgias, back pain, gait problem, joint swelling, myalgias and neck pain. Skin: Negative for color change, pallor, rash and wound. Allergic/Immunologic: Negative. Neurological: Negative for dizziness, tremors, weakness, numbness and headaches. Hematological: Negative. Psychiatric/Behavioral: Negative. Objective:   /67 (Site: Right Upper Arm, Position: Sitting, Cuff Size: Medium Adult)   Pulse 67   Temp 97 °F (36.1 °C) (Tympanic)   Resp 15   Ht 5' 4\" (1.626 m)   Wt 160 lb (72.6 kg)   SpO2 98%   BMI 27.46 kg/m²     Physical Exam  Vitals signs reviewed. Constitutional:       General: He is not in acute distress. Appearance: Normal appearance. He is well-developed. He is not ill-appearing or toxic-appearing. HENT:      Head: Normocephalic and atraumatic. Right Ear: Hearing and external ear normal.      Left Ear: Hearing and external ear normal.      Nose: Nose normal.      Mouth/Throat:      Mouth: Mucous membranes are not pale, not dry and not cyanotic. Eyes:      General: Lids are normal.   Neck:      Musculoskeletal: Normal range of motion and neck supple. Trachea: Trachea and phonation normal.   Cardiovascular:      Rate and Rhythm: Normal rate and regular rhythm. Pulses: Normal pulses.       Heart sounds: S1 normal and S2 normal.   Pulmonary:      Effort: Pulmonary effort is normal. No tachypnea, bradypnea, accessory muscle usage or respiratory distress. Breath sounds: Normal breath sounds. No decreased breath sounds, wheezing or rales. Chest:      Chest wall: No tenderness. Abdominal:      General: Bowel sounds are normal. There is no distension. Palpations: Abdomen is soft. There is no mass. Tenderness: There is no abdominal tenderness. Musculoskeletal: Normal range of motion. General: No tenderness. Skin:     General: Skin is warm and dry. Findings: No abrasion, bruising, burn, ecchymosis, erythema, laceration, lesion or rash. Neurological:      Mental Status: He is alert and oriented to person, place, and time. Motor: No tremor, atrophy or abnormal muscle tone. Coordination: Coordination normal.      Gait: Gait normal.      Deep Tendon Reflexes: Reflexes are normal and symmetric. Psychiatric:         Speech: Speech normal.         Behavior: Behavior normal.         Thought Content: Thought content normal.        Patient Active Problem List   Diagnosis    Tobacco use disorder    Essential hypertension---hypertensive stress test.    Urinary retention    S/P right inguinal hernia repair, follow-up exam     Assessment:     1. Status post repair of recurrent left inguinal hernia with mesh  2. History of recurrent bilateral inguinal hernia repair 4/28/20    Plan:     1. Incision healing well without signs of infection  2. No bulge or instability noted at old hernia site. Tenderness and mild swelling as expected. 3. Appetite and bowel function normal  4. Off all narcotics  5. Lifting/activity restrictions discussed with patient. Questions answered. Off work for 6-8 weeks at least due lifting restrictions and recurrent hernias. 6. Follow up in 3 weeks. Signs and symptoms reviewed with patient that would be concerning and need him to return to office for re-evaluation. Patient states he will call if he has questions or concerns.   7. Right now hard to tell if pre-surgical pain has resolved. Will continue to monitor and see how he progresses. If pain persists discussed nerve block in the future over the next 3+ months. 8. The above findings have been discussed with Dr. Milka Linton and the following plan of care developed in collaboration with him.         Electronically signed by FREDIS Harden CNP on 4/22/2021 at 3:44 PM

## 2021-04-21 ENCOUNTER — OFFICE VISIT (OUTPATIENT)
Dept: SURGERY | Age: 61
End: 2021-04-21

## 2021-04-21 VITALS
OXYGEN SATURATION: 98 % | DIASTOLIC BLOOD PRESSURE: 67 MMHG | HEART RATE: 67 BPM | WEIGHT: 160 LBS | RESPIRATION RATE: 15 BRPM | TEMPERATURE: 97 F | SYSTOLIC BLOOD PRESSURE: 135 MMHG | HEIGHT: 64 IN | BODY MASS INDEX: 27.31 KG/M2

## 2021-04-21 DIAGNOSIS — Z87.19 S/P LEFT INGUINAL HERNIA REPAIR: Primary | ICD-10-CM

## 2021-04-21 DIAGNOSIS — Z98.890 S/P LEFT INGUINAL HERNIA REPAIR: Primary | ICD-10-CM

## 2021-04-21 PROCEDURE — 99024 POSTOP FOLLOW-UP VISIT: CPT | Performed by: NURSE PRACTITIONER

## 2021-04-22 ASSESSMENT — ENCOUNTER SYMPTOMS
VOMITING: 0
SORE THROAT: 0
NAUSEA: 0
DIARRHEA: 0
ABDOMINAL PAIN: 0
BACK PAIN: 0
SHORTNESS OF BREATH: 0
RHINORRHEA: 0
COUGH: 0
CHOKING: 0
COLOR CHANGE: 0
PHOTOPHOBIA: 0
EYE PAIN: 0
EYE ITCHING: 0
APNEA: 0
CONSTIPATION: 0
SINUS PRESSURE: 0
CHEST TIGHTNESS: 0
WHEEZING: 0
ABDOMINAL DISTENTION: 0
EYE DISCHARGE: 0
ANAL BLEEDING: 0
ALLERGIC/IMMUNOLOGIC NEGATIVE: 1
BLOOD IN STOOL: 0

## 2021-05-06 PROBLEM — Z09 S/P RIGHT INGUINAL HERNIA REPAIR, FOLLOW-UP EXAM: Status: RESOLVED | Noted: 2021-04-06 | Resolved: 2021-05-06

## 2021-05-11 NOTE — PROGRESS NOTES
[address] [place] [address] [address] [facility] [address]  Dept: [phone]  Dept Fax: [phone]  Loc: [phone]    Visit Date: 5/12/2021    Kaur Owens is a 59 y.o. male who presents today for:  Chief Complaint   Patient presents with    Post-Op Check     s/p Repair of recurrent left inguinal hernia with mesh-3/23/2021 Last seen in the office on 4/21/2021       HPI:     MICK Harding is a 62-year old male patient who presents today for follow up status post repair of recurrent left inguinal hernia 7 weeks ago. Patient states he still has onoing left groin pain. Still hard to tell if it is postoperative pain or if it the same pain he had prior to surgery. States left groin pain can be sharp at times and other times a dull ache. It is intermittent throughout the day pending on what he does. He is up moving well. Pain is worse with palpation. Does not radiate down testicle. Denies scrotal swelling. Off all narcotics and pain medications. He taking Motrin or aleve as needed which helps a little bit. Appetite normal. No nausea or vomiting. No fever, chills, or sweats. Incision healing well without signs of infection. Has left groin swelling and tenderness but minimal. Induration is improving. No issues urinating. Bowels are back to normal. No stool softener use. No SOB or chest pain.  No lightheadedness or dizziness.     Past Medical History:   Diagnosis Date    Bilateral inguinal hernia 12/14/2011    Chronic back pain     Cough     patient is a  has a chronic cough    Essential hypertension 04/01/2016    GERD (gastroesophageal reflux disease)     Osteoarthritis     Recurrent left inguinal hernia     Stomach ulcer       Past Surgical History:   Procedure Laterality Date    ARM SURGERY Left 03/2020    [facility]-Dr. Nolan Saldivar    [facility]    [facility]    EGD  2019    [facility]    HERNIA REPAIR      child   [address] problem, hearing loss, rhinorrhea, sinus pressure and sore throat. Eyes: Negative for photophobia, pain, discharge, itching and visual disturbance. Respiratory: Negative for apnea, cough, choking, chest tightness, shortness of breath and wheezing. Cardiovascular: Negative for chest pain, palpitations and leg swelling. Gastrointestinal: Negative for abdominal distention, abdominal pain, anal bleeding, blood in stool, constipation, diarrhea, nausea and vomiting. Endocrine: Negative. Genitourinary: Negative for decreased urine volume, difficulty urinating, dysuria, frequency and urgency. Left groin discomfort   Musculoskeletal: Negative for arthralgias, back pain, gait problem, joint swelling, myalgias and neck pain. Skin: Negative for color change, pallor, rash and wound. Allergic/Immunologic: Negative. Neurological: Negative for dizziness, tremors, weakness, numbness and headaches. Hematological: Negative. Psychiatric/Behavioral: Negative. Objective:   /60 (Site: Right Upper Arm, Position: Sitting, Cuff Size: Medium Adult)   Pulse 84   Temp 97.3 °F (36.3 °C) (Temporal)   Resp 18   Ht 5' 4\" (1.626 m)   Wt 154 lb 9.6 oz (70.1 kg)   SpO2 96%   BMI 26.54 kg/m²     Physical Exam  Vitals signs reviewed. Constitutional:       General: He is not in acute distress. Appearance: Normal appearance. He is well-developed. He is not ill-appearing or toxic-appearing. HENT:      Head: Normocephalic and atraumatic. Right Ear: Hearing and external ear normal.      Left Ear: Hearing and external ear normal.      Nose: Nose normal.      Mouth/Throat:      Mouth: Mucous membranes are not pale, not dry and not cyanotic. Eyes:      General: Lids are normal.   Neck:      Musculoskeletal: Normal range of motion and neck supple. Trachea: Trachea and phonation normal.   Cardiovascular:      Rate and Rhythm: Normal rate and regular rhythm. Pulses: Normal pulses. Heart sounds: S1 normal and S2 normal.   Pulmonary:      Effort: Pulmonary effort is normal. No tachypnea, bradypnea, accessory muscle usage or respiratory distress. Breath sounds: Normal breath sounds. No decreased breath sounds, wheezing or rales. Chest:      Chest wall: No tenderness. Abdominal:      General: Bowel sounds are normal. There is no distension. Palpations: Abdomen is soft. There is no mass. Tenderness: There is no abdominal tenderness. Musculoskeletal: Normal range of motion. General: No tenderness. Skin:     General: Skin is warm and dry. Findings: No abrasion, bruising, burn, ecchymosis, erythema, laceration, lesion or rash. Neurological:      Mental Status: He is alert and oriented to person, place, and time. Motor: No tremor, atrophy or abnormal muscle tone. Coordination: Coordination normal.      Gait: Gait normal.      Deep Tendon Reflexes: Reflexes are normal and symmetric. Psychiatric:         Speech: Speech normal.         Behavior: Behavior normal.         Thought Content: Thought content normal.        Patient Active Problem List   Diagnosis    Tobacco use disorder    Essential hypertension---hypertensive stress test.    Urinary retention     Assessment:     1. Status post repair of recurrent left inguinal hernia with mesh  2. History of recurrent bilateral inguinal hernia repair 4/28/20  3. Chronic left groin pain     Plan:     1. Incision healed well without issues  2. No bulge or instability noted at old hernia site. Tenderness about the same. Swelling improved. 3. Off all narcotics  4. Lifting/activity restrictions discussed with patient. Questions answered. Keep patient off until seen in 4 weeks. 5. Follow up in 4 weeks to see Dr. Orlin Wade. Signs and symptoms reviewed with patient that would be concerning and need him to return to office for re-evaluation. Kristin Mobley states he will call if he has questions or concerns.   6. Discussed left groin nerve block with patient in radiology. Questions answered. Patient states he would like to give it at least 12 weeks postop before he makes any decisions to proceed with the nerve block. Feel that is appropriate at this time.      Electronically signed by FREDIS Mathew CNP on 5/14/2021 at 12:48 PM

## 2021-05-12 ENCOUNTER — OFFICE VISIT (OUTPATIENT)
Dept: SURGERY | Age: 61
End: 2021-05-12

## 2021-05-12 VITALS
HEART RATE: 84 BPM | DIASTOLIC BLOOD PRESSURE: 60 MMHG | BODY MASS INDEX: 26.4 KG/M2 | TEMPERATURE: 97.3 F | OXYGEN SATURATION: 96 % | SYSTOLIC BLOOD PRESSURE: 120 MMHG | RESPIRATION RATE: 18 BRPM | HEIGHT: 64 IN | WEIGHT: 154.6 LBS

## 2021-05-12 DIAGNOSIS — Z87.19 S/P LEFT INGUINAL HERNIA REPAIR: Primary | ICD-10-CM

## 2021-05-12 DIAGNOSIS — Z98.890 S/P LEFT INGUINAL HERNIA REPAIR: Primary | ICD-10-CM

## 2021-05-12 PROCEDURE — 99024 POSTOP FOLLOW-UP VISIT: CPT | Performed by: NURSE PRACTITIONER

## 2021-05-12 NOTE — LETTER
[address] [facility]  [place] [address] [address] [facility] [address]  Phone: [phone]  Fax: [phone]    FREDIS Barajas CNP        May 12, 2021     Patient: Suellen Mejía   YOB: 1960   Date of Visit: 5/12/2021       To Whom It May Concern: It is my medical opinion that Prasanth Steve should remain off work for another 4 weeks at least secondary to acute on chronic left groin pain and lifting restrictions. He will be seen back in our office on 6/16/21 for re-evaluation. If you have any questions or concerns, please don't hesitate to call.     Sincerely,        FREDIS Barajas CNP

## 2021-05-14 ASSESSMENT — ENCOUNTER SYMPTOMS
APNEA: 0
SORE THROAT: 0
ANAL BLEEDING: 0
COLOR CHANGE: 0
CHEST TIGHTNESS: 0
NAUSEA: 0
ALLERGIC/IMMUNOLOGIC NEGATIVE: 1
PHOTOPHOBIA: 0
BACK PAIN: 0
CONSTIPATION: 0
EYE ITCHING: 0
SHORTNESS OF BREATH: 0
SINUS PRESSURE: 0
CHOKING: 0
ABDOMINAL DISTENTION: 0
ABDOMINAL PAIN: 0
COUGH: 0
DIARRHEA: 0
WHEEZING: 0
BLOOD IN STOOL: 0
RHINORRHEA: 0
EYE DISCHARGE: 0
EYE PAIN: 0
VOMITING: 0

## 2021-06-16 ENCOUNTER — TELEPHONE (OUTPATIENT)
Dept: SURGERY | Age: 61
End: 2021-06-16

## 2021-06-16 ENCOUNTER — OFFICE VISIT (OUTPATIENT)
Dept: SURGERY | Age: 61
End: 2021-06-16

## 2021-06-16 VITALS
SYSTOLIC BLOOD PRESSURE: 118 MMHG | WEIGHT: 162 LBS | TEMPERATURE: 96.3 F | RESPIRATION RATE: 18 BRPM | BODY MASS INDEX: 27.66 KG/M2 | OXYGEN SATURATION: 96 % | DIASTOLIC BLOOD PRESSURE: 64 MMHG | HEART RATE: 83 BPM | HEIGHT: 64 IN

## 2021-06-16 DIAGNOSIS — Z87.19 STATUS POST LEFT INGUINAL HERNIA REPAIR: ICD-10-CM

## 2021-06-16 DIAGNOSIS — Z98.890 STATUS POST LEFT INGUINAL HERNIA REPAIR: ICD-10-CM

## 2021-06-16 DIAGNOSIS — R10.32 LEFT GROIN PAIN: Primary | ICD-10-CM

## 2021-06-16 PROCEDURE — 99024 POSTOP FOLLOW-UP VISIT: CPT | Performed by: SURGERY

## 2021-06-16 NOTE — TELEPHONE ENCOUNTER
Pt was seen in the office today, is to have nerve block injections for groin pain. Pt is currently off work and would like to continue his time off through 8/2 so that his procedures/time off will be covered by his employer. Ok to send off work letter through August 2?

## 2021-06-16 NOTE — LETTER
[address] [facility]  [place] [id number] [facility] [address]  Phone: [phone]  Fax: [phone]    Amiel Curling, MD        June 16, 2021     Patient: Paulino Kussmaul   YOB: 1960   Date of Visit: 6/16/2021       To Whom It May Concern: It is my medical opinion that Juaquin Mcgovern was evaluated in the office 6/16/21 and will need to remain off work through 8/2/21 for subsequent testing and treatments. If you have any questions or concerns, please don't hesitate to call.     Sincerely,        Amiel Curling, MD

## 2021-06-16 NOTE — TELEPHONE ENCOUNTER
Spoke w/ Martie Primrose left groin scheduled 6/17, arrive at 2:45 pm [facility] Radiology.    Pt voiced understanding

## 2021-06-17 ENCOUNTER — HOSPITAL ENCOUNTER (OUTPATIENT)
Dept: ULTRASOUND IMAGING | Age: 61
Discharge: HOME | End: 2021-06-17
Payer: COMMERCIAL

## 2021-06-17 DIAGNOSIS — R10.32 LEFT GROIN PAIN: ICD-10-CM

## 2021-06-17 DIAGNOSIS — Z98.890 STATUS POST LEFT INGUINAL HERNIA REPAIR: ICD-10-CM

## 2021-06-17 DIAGNOSIS — Z87.19 STATUS POST LEFT INGUINAL HERNIA REPAIR: ICD-10-CM

## 2021-06-17 PROCEDURE — 76882 US LMTD JT/FCL EVL NVASC XTR: CPT

## 2021-06-17 ASSESSMENT — ENCOUNTER SYMPTOMS
DIARRHEA: 0
VOMITING: 0
PHOTOPHOBIA: 0
CHEST TIGHTNESS: 0
ANAL BLEEDING: 0
SINUS PRESSURE: 0
SHORTNESS OF BREATH: 0
ABDOMINAL DISTENTION: 0
EYE REDNESS: 0
EYE PAIN: 0
COUGH: 0
RHINORRHEA: 0
FACIAL SWELLING: 0
COLOR CHANGE: 0
ALLERGIC/IMMUNOLOGIC NEGATIVE: 1
EYE ITCHING: 0
STRIDOR: 0
RECTAL PAIN: 0
CONSTIPATION: 0
VOICE CHANGE: 0
CHOKING: 0
EYE DISCHARGE: 0
BLOOD IN STOOL: 0
BACK PAIN: 0
SORE THROAT: 0
ABDOMINAL PAIN: 0
NAUSEA: 0
TROUBLE SWALLOWING: 0
WHEEZING: 0
APNEA: 0

## 2021-06-17 NOTE — PROGRESS NOTES
Tere Salazar (:  1960)     ASSESSMENT:  1.  Status post repair recurrent left inguinal hernia with mesh  2. History repair recurrent bilateral inguinal hernias with mesh (2021)   3. Chronic left groin pain    PLAN:  1. No signs of recurrent hernia on clinical exam today. No significant fluid collections. No infection/inflammation. Attempt interventional radiology nerve block. Patient in agreement. 2.  Restrictions discussed with patient. All questions answered. 3.  Follow-up in 46 weeks pending on improvement from nerve block. 4.  Discussed option of neurectomy to improve chronic groin pain. Also discussed tertiary center evaluation. All questions answered. He would like to hold on anything until nerve block attempts are completed. 5.  Signs and symptoms reviewed with patient that would be concerning and need him to return to office for re-evaluation. Patient states He will call if He has questions or concerns. SUBJECTIVE/OBJECTIVE:    Chief Complaint   Patient presents with   [facility]     s/p Repair of recurrent left inguinal hernia with mesh-3/23/2021 Last seen in the office on 2021-follow up left groin pain-recheck     MICK  Ekta Galindo is a 77-year-old male who presents for follow-up secondary to recurrent left inguinal hernia repair with mesh back in 2021. History of bilateral recurrent inguinal hernia repairs with mesh robotically in 2018. He still has some left groin discomfort. Mild to moderate. Depending on position and what he is doing. Sometimes worse when he is more active throughout the day. Denies any bulge or concern for infection. No issues in the right groin. No new urinary complaints. No hematochezia or melena. No chest pain or shortness of breath. Denies nausea or vomiting. No significant abdominal bloating/distention. No generalized abdominal pain. .  Tolerating regular diet.   Otherwise, feels he is at his baseline. Review of Systems   Constitutional: Negative for activity change, appetite change, chills, diaphoresis, fatigue, fever and unexpected weight change. HENT: Negative for congestion, dental problem, drooling, ear discharge, ear pain, facial swelling, hearing loss, mouth sores, nosebleeds, postnasal drip, rhinorrhea, sinus pressure, sneezing, sore throat, tinnitus, trouble swallowing and voice change. Eyes: Negative for photophobia, pain, discharge, redness, itching and visual disturbance. Respiratory: Negative for apnea, cough, choking, chest tightness, shortness of breath, wheezing and stridor. Cardiovascular: Negative for chest pain, palpitations and leg swelling. Gastrointestinal: Negative for abdominal distention, abdominal pain, anal bleeding, blood in stool, constipation, diarrhea, nausea, rectal pain and vomiting. Endocrine: Negative. Genitourinary: Negative for decreased urine volume, difficulty urinating, discharge, dysuria, enuresis, flank pain, frequency, genital sores, hematuria, penile pain, penile swelling, scrotal swelling, testicular pain and urgency. Musculoskeletal: Negative for arthralgias, back pain, gait problem, joint swelling, myalgias, neck pain and neck stiffness. Skin: Negative for color change, pallor, rash and wound. Allergic/Immunologic: Negative. Neurological: Negative for dizziness, tremors, seizures, syncope, facial asymmetry, speech difficulty, weakness, light-headedness, numbness and headaches. Hematological: Negative for adenopathy. Does not bruise/bleed easily. Psychiatric/Behavioral: Negative for agitation, behavioral problems, confusion, decreased concentration, dysphoric mood, hallucinations, self-injury, sleep disturbance and suicidal ideas. The patient is not nervous/anxious and is not hyperactive.         Past Medical History:   Diagnosis Date    Bilateral inguinal hernia 12/14/2011    Chronic back pain     Cough     patient is a hernesto has a chronic cough    Essential hypertension 04/01/2016    GERD (gastroesophageal reflux disease)     Osteoarthritis     Recurrent left inguinal hernia     Stomach ulcer        Past Surgical History:   Procedure Laterality Date    ARM SURGERY Left 03/2020    [facility]-Dr. Naida Vaca    [facility]  2019    [facility]    EGD  2019    [facility]    HERNIA REPAIR      child    HERNIA REPAIR  01/03/2012    [place]-left indirect reducible inguinal hernia with mesh    HERNIA REPAIR Bilateral 04/28/2020    ROBOTIC LEFT and Right INGUINAL REPAIR WITH MESH performed by Helga Lerma MD at [address] [place] [address] ([facility]) Left 3/23/2021    RECURRENT LEFT INGUINAL HERNIA, REPAIR WITH MESH performed by Helga Lerma MD at [address] [facility]  2005 x2    left-Dr. Mary Bailey    TOOTH EXTRACTION  1997    VASECTOMY  2005    WRIST SURGERY  12/27/2018    left wirst        Current Outpatient Medications   Medication Sig Dispense Refill    lisinopril (PRINIVIL;ZESTRIL) 5 MG tablet Take 1 tablet by mouth daily 90 tablet 1    meloxicam (MOBIC) 15 MG tablet Take 1 tablet by mouth daily 90 tablet 1     No current facility-administered medications for this visit.        Allergies   Allergen Reactions    Sulfa Antibiotics Hives       Family History   Problem Relation Age of Onset    Arthritis Mother     Heart Disease Mother     High Blood Pressure Mother     Arthritis Father     Cancer Father         prostate    Diabetes Father     Heart Disease Father     High Blood Pressure Father     Colon Cancer Father 80    Heart Disease Sister     High Blood Pressure Sister     Learning Disabilities Sister     Learning Disabilities Brother     High Blood Pressure Brother     Heart Disease Brother     Arthritis Brother     Cancer Brother         prostate       Social History     Socioeconomic History    Marital status:      Spouse name: Jolly    Number of children: 7    Years of education: 9    Highest education level: GED or equivalent   Occupational History    Occupation:      Employer: [employer]   Tobacco Use    Smoking status: Current Every Day Smoker     Packs/day: 0.50     Years: 40.00     Pack years: 20.00     Types: Cigarettes    Smokeless tobacco: Never Used    Tobacco comment: trying to cut down   Substance and Sexual Activity    Alcohol use: Yes     Alcohol/week: 0.0 standard drinks     Comment: occasionally    Drug use: No    Sexual activity: Yes   Other Topics Concern    Not on file   Social History Narrative    Not on file     Social Determinants of Health     Financial Resource Strain:     Difficulty of Paying Living Expenses:    Food Insecurity:     Worried About Running Out of Food in the Last Year:     [address] in the Last Year:    Transportation Needs:     Lack of Transportation (Medical):  Lack of Transportation (Non-Medical):    Physical Activity:     Days of Exercise per Week:     Minutes of Exercise per Session:    Stress:     Feeling of Stress :    Social Connections:     Frequency of Communication with Friends and Family:     Frequency of Social Gatherings with Friends and Family:     Attends Church Services:     Active Member of Clubs or Organizations:     Attends Club or Organization Meetings:     Marital Status:    Intimate Partner Violence:     Fear of Current or Ex-Partner:     Emotionally Abused:     Physically Abused:     Sexually Abused:      Vitals:    06/16/21 0953   BP: 118/64   Site: Left Upper Arm   Position: Sitting   Cuff Size: Medium Adult   Pulse: 83   Resp: 18   Temp: 96.3 °F (35.7 °C)   TempSrc: Temporal   SpO2: 96%   Weight: 162 lb (73.5 kg)   Height: 5' 4\" (1.626 m)     Body mass index is 27.81 kg/m². Wt Readings from Last 3 Encounters:   06/16/21 162 lb (73.5 kg)   05/12/21 154 lb 9.6 oz (70.1 kg)   04/21/21 160 lb (72.6 kg)     Physical Exam  Vitals reviewed.    Constitutional:       General: He is not in acute distress. Appearance: He is well-developed. He is not diaphoretic. HENT:      Head: Normocephalic and atraumatic. Right Ear: External ear normal.      Left Ear: External ear normal.      Nose: Nose normal.   Eyes:      General: No scleral icterus. Right eye: No discharge. Left eye: No discharge. Conjunctiva/sclera: Conjunctivae normal.   Cardiovascular:      Rate and Rhythm: Normal rate and regular rhythm. Heart sounds: Normal heart sounds. Pulmonary:      Effort: Pulmonary effort is normal. No respiratory distress. Breath sounds: Normal breath sounds. No wheezing or rales. Chest:      Chest wall: No tenderness. Abdominal:      General: Bowel sounds are normal. There is no distension. Palpations: Abdomen is soft. There is no mass. Tenderness: There is no abdominal tenderness. There is no guarding or rebound. Hernia: There is no hernia in the left inguinal area or right inguinal area. Musculoskeletal:         General: No tenderness. Normal range of motion. Cervical back: Normal range of motion and neck supple. Skin:     General: Skin is warm and dry. Coloration: Skin is not pale. Findings: No erythema or rash. Neurological:      Mental Status: He is alert and oriented to person, place, and time. Cranial Nerves: No cranial nerve deficit. Psychiatric:         Behavior: Behavior normal.         Thought Content:  Thought content normal.         Judgment: Judgment normal.       Lab Results   Component Value Date     03/18/2021    K 5.1 03/23/2021     03/18/2021    CO2 26 03/18/2021     Lab Results   Component Value Date    CREATININE 1.0 03/18/2021     Lab Results   Component Value Date    WBC 6.7 03/03/2020    HGB 15.0 03/18/2021    HCT 47.4 03/18/2021    MCV 89.1 03/03/2020     03/03/2020     Imaging - none    Patient Active Problem List   Diagnosis    Tobacco use disorder    Essential hypertension---hypertensive stress test.    Urinary retention     An electronic signature was used to authenticate this note.     --Silvana De La Garza MD

## 2021-06-23 RX ORDER — BUPIVACAINE HYDROCHLORIDE 2.5 MG/ML
5 INJECTION, SOLUTION EPIDURAL; INFILTRATION; INTRACAUDAL ONCE
Status: CANCELLED | OUTPATIENT
Start: 2021-06-23 | End: 2021-06-23

## 2021-06-23 RX ORDER — METHYLPREDNISOLONE ACETATE 80 MG/ML
80 INJECTION, SUSPENSION INTRA-ARTICULAR; INTRALESIONAL; INTRAMUSCULAR; SOFT TISSUE ONCE
Status: CANCELLED | OUTPATIENT
Start: 2021-06-23 | End: 2021-06-23

## 2021-06-24 ENCOUNTER — HOSPITAL ENCOUNTER (OUTPATIENT)
Dept: INTERVENTIONAL RADIOLOGY/VASCULAR | Age: 61
Discharge: HOME | End: 2021-06-24
Payer: COMMERCIAL

## 2021-06-24 DIAGNOSIS — Z98.890 STATUS POST LEFT INGUINAL HERNIA REPAIR: ICD-10-CM

## 2021-06-24 DIAGNOSIS — R10.32 LEFT GROIN PAIN: ICD-10-CM

## 2021-06-24 DIAGNOSIS — Z87.19 STATUS POST LEFT INGUINAL HERNIA REPAIR: ICD-10-CM

## 2021-06-24 DIAGNOSIS — R52 PAIN: ICD-10-CM

## 2021-06-24 PROCEDURE — 77002 NEEDLE LOCALIZATION BY XRAY: CPT | Performed by: RADIOLOGY

## 2021-06-24 PROCEDURE — 6360000002 HC RX W HCPCS: Performed by: RADIOLOGY

## 2021-06-24 PROCEDURE — 20552 NJX 1/MLT TRIGGER POINT 1/2: CPT | Performed by: RADIOLOGY

## 2021-06-24 PROCEDURE — 2709999900

## 2021-06-24 PROCEDURE — 2500000003 HC RX 250 WO HCPCS: Performed by: RADIOLOGY

## 2021-06-24 RX ORDER — METHYLPREDNISOLONE ACETATE 80 MG/ML
80 INJECTION, SUSPENSION INTRA-ARTICULAR; INTRALESIONAL; INTRAMUSCULAR; SOFT TISSUE ONCE
Status: COMPLETED | OUTPATIENT
Start: 2021-06-24 | End: 2021-06-24

## 2021-06-24 RX ORDER — BUPIVACAINE HYDROCHLORIDE 2.5 MG/ML
5 INJECTION, SOLUTION EPIDURAL; INFILTRATION; INTRACAUDAL ONCE
Status: COMPLETED | OUTPATIENT
Start: 2021-06-24 | End: 2021-06-24

## 2021-06-24 RX ADMIN — BUPIVACAINE HYDROCHLORIDE 4 ML: 2.5 INJECTION, SOLUTION EPIDURAL; INFILTRATION; INTRACAUDAL at 15:22

## 2021-06-24 RX ADMIN — METHYLPREDNISOLONE ACETATE 80 MG: 80 INJECTION, SUSPENSION INTRA-ARTICULAR; INTRALESIONAL; INTRAMUSCULAR; SOFT TISSUE at 15:22

## 2021-06-24 ASSESSMENT — PAIN SCALES - GENERAL: PAINLEVEL_OUTOF10: 4

## 2021-06-24 NOTE — OP NOTE
Department of Radiology  Post Procedure Progress Note      Pre-Procedure Diagnosis:  Left groin pain    Procedure Performed:  Trigger point injection, left groin    Anesthesia: local     Findings: successful    Immediate Complications:  None    Estimated Blood Loss: minimal    SEE DICTATED PROCEDURE NOTE FOR COMPLETE DETAILS.     Verónica Guzman MD   6/24/2021 3:25 PM

## 2021-06-24 NOTE — PROGRESS NOTES
1509 Patient received in IR for Trigger point injection. 1511 This procedure has been fully reviewed with the patient and written informed consent has been obtained. 1522 Procedure started with Dr. Alvina King. 1523 Procedure completed; patient tolerated well. Band aid to left groin; no bleeding noted. 1525 Patient taken to lobby via ambulation.

## 2021-09-17 DIAGNOSIS — I10 ESSENTIAL HYPERTENSION: Chronic | ICD-10-CM

## 2021-09-17 RX ORDER — LISINOPRIL 5 MG/1
5 TABLET ORAL DAILY
Qty: 90 TABLET | Refills: 1 | Status: SHIPPED | OUTPATIENT
Start: 2021-09-17 | End: 2022-03-28 | Stop reason: SDUPTHER

## 2021-09-17 NOTE — TELEPHONE ENCOUNTER
Rocky Campbell called requesting a refill on the following medications:  Requested Prescriptions     Pending Prescriptions Disp Refills    lisinopril (PRINIVIL;ZESTRIL) 5 MG tablet 90 tablet 1     Sig: Take 1 tablet by mouth daily       Date of last visit: 3/5/2021  Date of next visit (if applicable):Visit date not found  Date of last refill: 03/05/21  Pharmacy Name: [facility]      Thanks,  Duc Modi LPN

## 2021-10-21 RX ORDER — MELOXICAM 15 MG/1
15 TABLET ORAL DAILY
Qty: 90 TABLET | Refills: 1 | Status: SHIPPED | OUTPATIENT
Start: 2021-10-21 | End: 2022-03-28 | Stop reason: SDUPTHER

## 2021-10-21 NOTE — TELEPHONE ENCOUNTER
----- Message from Yenni Maradiaga sent at 10/21/2021  2:43 PM EDT -----  Subject: Refill Request    QUESTIONS  Name of Medication? meloxicam (MOBIC) 15 MG tablet  Patient-reported dosage and instructions? 15mg  How many days do you have left? 0  Preferred Pharmacy? [id number] [facility] phone number (if available)? [phone]  ---------------------------------------------------------------------------  --------------  CALL BACK INFO  What is the best way for the office to contact you? OK to leave message on   voicemail  Preferred Call Back Phone Number?  [phone]

## 2021-12-27 ENCOUNTER — HOSPITAL ENCOUNTER (EMERGENCY)
Age: 61
Discharge: HOME | End: 2021-12-27
Payer: COMMERCIAL

## 2021-12-27 VITALS
OXYGEN SATURATION: 96 % | DIASTOLIC BLOOD PRESSURE: 78 MMHG | RESPIRATION RATE: 16 BRPM | HEART RATE: 76 BPM | WEIGHT: 160 LBS | SYSTOLIC BLOOD PRESSURE: 132 MMHG | BODY MASS INDEX: 27.46 KG/M2 | TEMPERATURE: 98.4 F

## 2021-12-27 DIAGNOSIS — J06.9 VIRAL URI WITH COUGH: Primary | ICD-10-CM

## 2021-12-27 LAB
FLU A ANTIGEN: NEGATIVE
FLU B ANTIGEN: NEGATIVE
SARS-COV-2, NAA: NOT  DETECTED

## 2021-12-27 PROCEDURE — 99213 OFFICE O/P EST LOW 20 MIN: CPT | Performed by: EMERGENCY MEDICINE

## 2021-12-27 PROCEDURE — 87804 INFLUENZA ASSAY W/OPTIC: CPT

## 2021-12-27 PROCEDURE — 87635 SARS-COV-2 COVID-19 AMP PRB: CPT

## 2021-12-27 PROCEDURE — 99213 OFFICE O/P EST LOW 20 MIN: CPT

## 2021-12-27 ASSESSMENT — ENCOUNTER SYMPTOMS
NAUSEA: 0
SHORTNESS OF BREATH: 0
COUGH: 1
DIARRHEA: 0
VOMITING: 0
ABDOMINAL PAIN: 0
SORE THROAT: 1
RHINORRHEA: 1
COLOR CHANGE: 0

## 2021-12-27 ASSESSMENT — PAIN SCALES - GENERAL: PAINLEVEL_OUTOF10: 4

## 2021-12-27 NOTE — Clinical Note
Marlen Mccray was seen and treated in our emergency department on 12/27/2021. He may return to work on 12/28/2021. If you have any questions or concerns, please don't hesitate to call.       FREDIS Davies - CNP

## 2021-12-27 NOTE — ED PROVIDER NOTES
[address]  Urgent Care Encounter       CHIEF COMPLAINT       Chief Complaint   Patient presents with    Nasal Congestion    Cough    Headache    Pharyngitis       Nurses Notes reviewed and I agree except as noted in the HPI. HISTORY OF PRESENT ILLNESS   Rodney Lin is a 59 y.o. male who presents for complaints of congestion, mild cough, sore throat, loss of sensation of taste. Symptoms for 3 days. Patient states he has custody of his grandchildren. To the grandchildren have runny noses and occasional cough. They have not been tested for Covid or influenza. Patient states he has not been vaccinated against Covid. He has not had previous Covid illness. No flu shot this year. HPI    REVIEW OF SYSTEMS     Review of Systems   Constitutional: Negative for chills, fatigue and fever. HENT: Positive for congestion, rhinorrhea and sore throat. Respiratory: Positive for cough. Negative for shortness of breath. Cardiovascular: Negative for chest pain. Gastrointestinal: Negative for abdominal pain, diarrhea, nausea and vomiting. Skin: Negative for color change. Neurological: Positive for headaches. Negative for dizziness. PAST MEDICAL HISTORY         Diagnosis Date    Bilateral inguinal hernia 12/14/2011    Chronic back pain     Cough     patient is a  has a chronic cough    Essential hypertension 04/01/2016    GERD (gastroesophageal reflux disease)     Osteoarthritis     Recurrent left inguinal hernia     Stomach ulcer        SURGICALHISTORY     Patient  has a past surgical history that includes Rotator cuff repair (2005 x2); hernia repair; hernia repair (01/03/2012); Vasectomy (2005); Tooth Extraction (1997); Colonoscopy (2019); EGD (2019); Wrist surgery (12/27/2018); Arm Surgery (Left, 03/2020); hernia repair (Bilateral, 04/28/2020); and hernia repair (Left, 3/23/2021).     CURRENT MEDICATIONS       Previous Medications    LISINOPRIL (PRINIVIL;ZESTRIL) 5 MG TABLET    Take 1 tablet by mouth daily    MELOXICAM (MOBIC) 15 MG TABLET    Take 1 tablet by mouth daily       ALLERGIES     Patient is is allergic to sulfa antibiotics. Patients   Immunization History   Administered Date(s) Administered    Influenza Virus Vaccine 03/15/2014    Pneumococcal Polysaccharide ([id number]) 03/15/2014       FAMILY HISTORY     Patient's family history includes Arthritis in his brother, father, and mother; Cancer in his brother and father; Colon Cancer (age of onset: 80) in his father; Diabetes in his father; Heart Disease in his brother, father, mother, and sister; High Blood Pressure in his brother, father, mother, and sister; Learning Disabilities in his brother and sister. SOCIAL HISTORY     Patient  reports that he has been smoking cigarettes. He has a 20.00 pack-year smoking history. He has never used smokeless tobacco. He reports current alcohol use. He reports that he does not use drugs. PHYSICAL EXAM     ED TRIAGE VITALS  BP: 132/78, Temp: 98.4 °F (36.9 °C), Pulse: 76, Resp: 16, SpO2: 96 %,Estimated body mass index is 27.46 kg/m² as calculated from the following:    Height as of 6/16/21: 5' 4\" (1.626 m). Weight as of this encounter: 160 lb (72.6 kg). ,No LMP for male patient. Physical Exam  Constitutional:       General: He is not in acute distress. Appearance: He is normal weight. He is ill-appearing. He is not toxic-appearing. HENT:      Head: Normocephalic. Right Ear: Tympanic membrane and ear canal normal.      Left Ear: Tympanic membrane and ear canal normal.      Nose: Congestion and rhinorrhea present. Mouth/Throat:      Mouth: Mucous membranes are moist. No oral lesions. Pharynx: Oropharynx is clear. No oropharyngeal exudate. Tonsils: No tonsillar exudate or tonsillar abscesses. 1+ on the right. 1+ on the left. Cardiovascular:      Rate and Rhythm: Normal rate and regular rhythm.       Heart sounds: Normal heart sounds. No murmur heard. Pulmonary:      Effort: Pulmonary effort is normal. No respiratory distress. Breath sounds: Normal breath sounds. No wheezing or rhonchi. Abdominal:      General: There is no distension. Palpations: Abdomen is soft. Tenderness: There is no abdominal tenderness. Lymphadenopathy:      Cervical: No cervical adenopathy. Skin:     General: Skin is warm. Capillary Refill: Capillary refill takes less than 2 seconds. Neurological:      General: No focal deficit present. Mental Status: He is alert. Psychiatric:         Mood and Affect: Mood normal.         DIAGNOSTIC RESULTS     Labs:  Results for orders placed or performed during the hospital encounter of 12/27/21   COVID-19, Rapid   Result Value Ref Range    SARS-CoV-2, JANE NOT  DETECTED NOT DETECTED   Rapid influenza A/B antigens   Result Value Ref Range    Flu A Antigen Negative NEGATIVE    Flu B Antigen Negative NEGATIVE       IMAGING:    No orders to display         EKG:      URGENT CARE COURSE:     Vitals:    12/27/21 1404   BP: 132/78   Pulse: 76   Resp: 16   Temp: 98.4 °F (36.9 °C)   SpO2: 96%   Weight: 160 lb (72.6 kg)       Medications - No data to display         PROCEDURES:  None    FINAL IMPRESSION      1. Viral URI with cough          DISPOSITION/ PLAN     Presents for what is likely viral URI. Influenza and Covid testing are negative. Patient is in no distress. Respirations are easy. Advised to drink plenty fluids. Over-the-counter medications as needed for symptoms.   Return for new or worsening symptoms      PATIENT REFERRED TO:  Emily Yancey MD  [facility] [address] / [address] [address]      DISCHARGE MEDICATIONS:  New Prescriptions    No medications on file       Discontinued Medications    No medications on file       Current Discharge Medication List          Corky Degree, APRN - CNP    (Please note that portions of this note were completed with a voice recognition program. Efforts were made to edit the dictations but occasionally words are mis-transcribed.)          Jenny Velasquez, FREDIS - CNP  12/27/21 [address] Jr Aquiles Maldonado , FREDIS - ENRIQUE  12/27/21 2095

## 2021-12-28 ENCOUNTER — TELEPHONE (OUTPATIENT)
Dept: FAMILY MEDICINE CLINIC | Age: 61
End: 2021-12-28

## 2022-01-04 ENCOUNTER — NURSE TRIAGE (OUTPATIENT)
Dept: OTHER | Facility: CLINIC | Age: 62
End: 2022-01-04

## 2022-01-04 ENCOUNTER — HOSPITAL ENCOUNTER (EMERGENCY)
Age: 62
Discharge: HOME | End: 2022-01-04
Payer: COMMERCIAL

## 2022-01-04 VITALS
OXYGEN SATURATION: 96 % | TEMPERATURE: 97 F | RESPIRATION RATE: 18 BRPM | HEART RATE: 82 BPM | DIASTOLIC BLOOD PRESSURE: 81 MMHG | SYSTOLIC BLOOD PRESSURE: 140 MMHG

## 2022-01-04 DIAGNOSIS — J01.00 ACUTE NON-RECURRENT MAXILLARY SINUSITIS: Primary | ICD-10-CM

## 2022-01-04 PROCEDURE — 99213 OFFICE O/P EST LOW 20 MIN: CPT | Performed by: NURSE PRACTITIONER

## 2022-01-04 PROCEDURE — 99213 OFFICE O/P EST LOW 20 MIN: CPT

## 2022-01-04 RX ORDER — AMOXICILLIN AND CLAVULANATE POTASSIUM 875; 125 MG/1; MG/1
1 TABLET, FILM COATED ORAL 2 TIMES DAILY
Qty: 14 TABLET | Refills: 0 | Status: SHIPPED | OUTPATIENT
Start: 2022-01-04 | End: 2022-01-11

## 2022-01-04 RX ORDER — IBUPROFEN 600 MG/1
600 TABLET ORAL EVERY 6 HOURS PRN
COMMUNITY
End: 2022-03-28

## 2022-01-04 RX ORDER — PREDNISONE 20 MG/1
40 TABLET ORAL DAILY
Qty: 10 TABLET | Refills: 0 | Status: SHIPPED | OUTPATIENT
Start: 2022-01-04 | End: 2022-01-09

## 2022-01-04 ASSESSMENT — ENCOUNTER SYMPTOMS
VOMITING: 0
SHORTNESS OF BREATH: 0
NAUSEA: 0

## 2022-01-04 NOTE — ED PROVIDER NOTES
[address]  Urgent Care Encounter       CHIEF COMPLAINT       Chief Complaint   Patient presents with    Cough    Head Congestion       Nurses Notes reviewed and I agree except as noted in the HPI. HISTORY OF PRESENT ILLNESS   Katie Mayfield is a 59 y.o. male who presents with complaints of a cough, congestion and mild sore throat. Symptoms have been present for at least the past week. He was seen here on 12/27 with similar symptoms. He was negative for Covid and influenza at that time. States symptoms are getting worse. He is having a hoarse voice now. He has nasal and sinus congestion as well. No fever, chills, body aches, nausea, vomiting or diarrhea. The history is provided by the patient. REVIEW OF SYSTEMS     Review of Systems   Constitutional: Negative for appetite change, chills, fatigue and fever. HENT: Positive for congestion, sinus pressure, sore throat and voice change. Negative for ear pain. Respiratory: Positive for cough. Negative for shortness of breath. Cardiovascular: Negative for chest pain. Gastrointestinal: Negative for diarrhea, nausea and vomiting. Musculoskeletal: Negative for myalgias. Skin: Negative for rash. Neurological: Negative for headaches. PAST MEDICAL HISTORY         Diagnosis Date    Bilateral inguinal hernia 12/14/2011    Chronic back pain     Cough     patient is a  has a chronic cough    Essential hypertension 04/01/2016    GERD (gastroesophageal reflux disease)     Osteoarthritis     Recurrent left inguinal hernia     Stomach ulcer        SURGICALHISTORY     Patient  has a past surgical history that includes Rotator cuff repair (2005 x2); hernia repair; hernia repair (01/03/2012); Vasectomy (2005); Tooth Extraction (1997); Colonoscopy (2019); EGD (2019); Wrist surgery (12/27/2018); Arm Surgery (Left, 03/2020); hernia repair (Bilateral, 04/28/2020); and hernia repair (Left, 3/23/2021).     CURRENT MEDICATIONS       Discharge Medication List as of 1/4/2022  2:15 PM      CONTINUE these medications which have NOT CHANGED    Details   ibuprofen (ADVIL;MOTRIN) 600 MG tablet Take 600 mg by mouth every 6 hours as needed for PainHistorical Med      Pseudoeph-Doxylamine-DM-APAP (NYQUIL PO) Take by mouthHistorical Med      meloxicam (MOBIC) 15 MG tablet Take 1 tablet by mouth daily, Disp-90 tablet, R-1Normal      lisinopril (PRINIVIL;ZESTRIL) 5 MG tablet Take 1 tablet by mouth daily, Disp-90 tablet, R-1Normal             ALLERGIES     Patient is is allergic to sulfa antibiotics. Patients   Immunization History   Administered Date(s) Administered    Influenza Virus Vaccine 03/15/2014    Pneumococcal Polysaccharide ([id number]) 03/15/2014       FAMILY HISTORY     Patient's family history includes Arthritis in his brother, father, and mother; Cancer in his brother and father; Colon Cancer (age of onset: 80) in his father; Diabetes in his father; Heart Disease in his brother, father, mother, and sister; High Blood Pressure in his brother, father, mother, and sister; Learning Disabilities in his brother and sister. SOCIAL HISTORY     Patient  reports that he has been smoking cigarettes. He has a 20.00 pack-year smoking history. He has never used smokeless tobacco. He reports current alcohol use. He reports that he does not use drugs. PHYSICAL EXAM     ED TRIAGE VITALS  BP: (!) 140/81, Temp: 97 °F (36.1 °C), Pulse: 82, Resp: 18, SpO2: 96 %,Estimated body mass index is 27.46 kg/m² as calculated from the following:    Height as of 6/16/21: 5' 4\" (1.626 m). Weight as of 12/27/21: 160 lb (72.6 kg). ,No LMP for male patient. Physical Exam  Vitals and nursing note reviewed. Constitutional:       General: He is not in acute distress. Appearance: He is well-developed. He is not ill-appearing. HENT:      Head: Normocephalic and atraumatic.       Right Ear: Tympanic membrane and ear canal normal.      Left Ear: Tympanic membrane and ear canal normal.      Nose: Congestion present. Right Sinus: Maxillary sinus tenderness present. No frontal sinus tenderness. Left Sinus: Maxillary sinus tenderness present. No frontal sinus tenderness. Mouth/Throat:      Lips: Pink. Mouth: Mucous membranes are moist.      Pharynx: Oropharynx is clear. Uvula midline. No posterior oropharyngeal erythema. Eyes:      General: Lids are normal. No scleral icterus. Conjunctiva/sclera: Conjunctivae normal.      Pupils: Pupils are equal.   Cardiovascular:      Rate and Rhythm: Normal rate and regular rhythm. Heart sounds: Normal heart sounds, S1 normal and S2 normal.   Pulmonary:      Effort: Pulmonary effort is normal. No respiratory distress. Breath sounds: Normal breath sounds. Musculoskeletal:      Comments: Normal active ROM x 4 extremities  Gait steady   Lymphadenopathy:      Comments: No head or neck adenopathy   Skin:     General: Skin is warm and dry. Findings: No rash (to exposed skin). Neurological:      General: No focal deficit present. Mental Status: He is alert and oriented to person, place, and time. Sensory: No sensory deficit. Comments: Answers questions readily and appropriately   Psychiatric:         Mood and Affect: Mood normal.         Speech: Speech normal.         Behavior: Behavior normal. Behavior is cooperative. DIAGNOSTIC RESULTS     Labs:No results found for this visit on 01/04/22. IMAGING:    No orders to display         EKG:      URGENT CARE COURSE:     Vitals:    01/04/22 1312   BP: (!) 140/81   Pulse: 82   Resp: 18   Temp: 97 °F (36.1 °C)   TempSrc: Temporal   SpO2: 96%       Medications - No data to display         PROCEDURES:  None    FINAL IMPRESSION      1. Acute non-recurrent maxillary sinusitis          DISPOSITION/ PLAN     Patient with acute maxillary sinusitis. He will be treated with Augmentin and prednisone.   Continue over-the-counter medications for symptom management. Follow-up family doctor in 1 week if not improved. Further instructions were outlined verbally and in the patient's discharge instructions. All the patient's questions were answered. The patient/parent agreed with the plan and was discharged from the [facility] in good condition.       PATIENT REFERRED TO:  Rosario Reyna MD  [address] [address]      DISCHARGE MEDICATIONS:  Discharge Medication List as of 1/4/2022  2:15 PM      START taking these medications    Details   amoxicillin-clavulanate (AUGMENTIN) 875-125 MG per tablet Take 1 tablet by mouth 2 times daily for 7 days, Disp-14 tablet, R-0Normal      predniSONE (DELTASONE) 20 MG tablet Take 2 tablets by mouth daily for 5 days, Disp-10 tablet, R-0Normal             Discharge Medication List as of 1/4/2022  2:15 PM          Discharge Medication List as of 1/4/2022  2:15 PM          FREDIS Butler CNP    (Please note that portions of this note were completed with a voice recognition program. Efforts were made to edit the dictations but occasionally words are mis-transcribed.)         FREDIS Butler CNP  01/05/22 [id number]

## 2022-01-04 NOTE — ED NOTES
Pt back to be seen with worsening symptoms from visit on 12/27/2. Pt has a cough that has worsened and feels like it is more in his chest and has lost his voice.        Jaden Moore RN  01/04/22 4543

## 2022-01-04 NOTE — TELEPHONE ENCOUNTER
Received call from [facility] with [employer]. Subjective: Caller states \"Shortness of breath and cough for 7 days\"    Current Symptoms: Cough, congestion, seen at [address] 3 days ago, negative COVID and flu swab at that time. Onset: 7 days ago; gradual    Associated Symptoms: Difficulty catching breath associated with cough that has worsened since ED visit. Denies chest pain. Able to speak full sentences with intermittent coughing episodes. Pain Severity: Denies    Temperature: Denies    What has been tried: Ibuprofen, nyquil, sinus medication and spray    Attempted to contact [address] for 2nd level Triage, Evette Gandhi states PT's PCP is no longer at that location and he is not established with any other MD's in that office currently. Unable to take pt information. Referred pt to [facility] he was seen at for further care. Recommended disposition: Proceed to [facility] now    Care advice provided, patient verbalizes understanding; denies any other questions or concerns; instructed to call back for any new or worsening symptoms. Patient/caller proceeding to nearest [facility]      Attention Provider: Thank you for allowing me to participate in the care of your patient. The patient was connected to triage in response to information provided to the ECC/PSC. Please do not respond through this encounter as the response is not directed to a shared pool.         Reason for Disposition   Patient sounds very sick or weak to the triager    Protocols used: BREATHING DIFFICULTY-ADULT-[place]

## 2022-01-05 ENCOUNTER — TELEPHONE (OUTPATIENT)
Dept: FAMILY MEDICINE CLINIC | Age: 62
End: 2022-01-05

## 2022-01-05 ASSESSMENT — ENCOUNTER SYMPTOMS
COUGH: 1
SORE THROAT: 1
VOICE CHANGE: 1
DIARRHEA: 0
SINUS PRESSURE: 1

## 2022-01-22 ENCOUNTER — HOSPITAL ENCOUNTER (EMERGENCY)
Age: 62
Discharge: HOME | End: 2022-01-22
Attending: INTERNAL MEDICINE
Payer: COMMERCIAL

## 2022-01-22 ENCOUNTER — APPOINTMENT (OUTPATIENT)
Dept: GENERAL RADIOLOGY | Age: 62
End: 2022-01-22
Payer: COMMERCIAL

## 2022-01-22 VITALS
WEIGHT: 150 LBS | HEART RATE: 90 BPM | OXYGEN SATURATION: 95 % | RESPIRATION RATE: 18 BRPM | DIASTOLIC BLOOD PRESSURE: 88 MMHG | TEMPERATURE: 97.9 F | SYSTOLIC BLOOD PRESSURE: 134 MMHG | HEIGHT: 65 IN | BODY MASS INDEX: 24.99 KG/M2

## 2022-01-22 DIAGNOSIS — U07.1 COVID: Primary | ICD-10-CM

## 2022-01-22 LAB
ALBUMIN SERPL-MCNC: 4.3 G/DL (ref 3.5–5.1)
ALP BLD-CCNC: 81 U/L (ref 38–126)
ALT SERPL-CCNC: 51 U/L (ref 11–66)
ANION GAP SERPL CALCULATED.3IONS-SCNC: 11 MEQ/L (ref 8–16)
AST SERPL-CCNC: 33 U/L (ref 5–40)
BASOPHILS # BLD: 0.3 %
BASOPHILS ABSOLUTE: 0 THOU/MM3 (ref 0–0.1)
BILIRUB SERPL-MCNC: 0.4 MG/DL (ref 0.3–1.2)
BUN BLDV-MCNC: 13 MG/DL (ref 7–22)
CALCIUM SERPL-MCNC: 9 MG/DL (ref 8.5–10.5)
CHLORIDE BLD-SCNC: 99 MEQ/L (ref 98–111)
CO2: 27 MEQ/L (ref 23–33)
CREAT SERPL-MCNC: 1 MG/DL (ref 0.4–1.2)
EKG ATRIAL RATE: 81 BPM
EKG P AXIS: 46 DEGREES
EKG P-R INTERVAL: 192 MS
EKG Q-T INTERVAL: 348 MS
EKG QRS DURATION: 92 MS
EKG QTC CALCULATION (BAZETT): 404 MS
EKG R AXIS: 57 DEGREES
EKG T AXIS: 52 DEGREES
EKG VENTRICULAR RATE: 81 BPM
EOSINOPHIL # BLD: 0.3 %
EOSINOPHILS ABSOLUTE: 0 THOU/MM3 (ref 0–0.4)
ERYTHROCYTE [DISTWIDTH] IN BLOOD BY AUTOMATED COUNT: 14.4 % (ref 11.5–14.5)
ERYTHROCYTE [DISTWIDTH] IN BLOOD BY AUTOMATED COUNT: 45.3 FL (ref 35–45)
FLU A ANTIGEN: NEGATIVE
FLU B ANTIGEN: NEGATIVE
GFR SERPL CREATININE-BSD FRML MDRD: 75 ML/MIN/1.73M2
GLUCOSE BLD-MCNC: 108 MG/DL (ref 70–108)
HCT VFR BLD CALC: 44.3 % (ref 42–52)
HEMOGLOBIN: 14.7 GM/DL (ref 14–18)
IMMATURE GRANS (ABS): 0.03 THOU/MM3 (ref 0–0.07)
IMMATURE GRANULOCYTES: 0.4 %
LYMPHOCYTES # BLD: 19.6 %
LYMPHOCYTES ABSOLUTE: 1.3 THOU/MM3 (ref 1–4.8)
MCH RBC QN AUTO: 28.5 PG (ref 26–33)
MCHC RBC AUTO-ENTMCNC: 33.2 GM/DL (ref 32.2–35.5)
MCV RBC AUTO: 85.9 FL (ref 80–94)
MONOCYTES # BLD: 8.2 %
MONOCYTES ABSOLUTE: 0.6 THOU/MM3 (ref 0.4–1.3)
NUCLEATED RED BLOOD CELLS: 0 /100 WBC
OSMOLALITY CALCULATION: 274.5 MOSMOL/KG (ref 275–300)
PLATELET # BLD: 247 THOU/MM3 (ref 130–400)
PMV BLD AUTO: 9.3 FL (ref 9.4–12.4)
POTASSIUM SERPL-SCNC: 4.7 MEQ/L (ref 3.5–5.2)
PRO-BNP: 127.6 PG/ML (ref 0–900)
RBC # BLD: 5.16 MILL/MM3 (ref 4.7–6.1)
SARS-COV-2, NAAT: DETECTED
SEG NEUTROPHILS: 71.2 %
SEGMENTED NEUTROPHILS ABSOLUTE COUNT: 4.8 THOU/MM3 (ref 1.8–7.7)
SODIUM BLD-SCNC: 137 MEQ/L (ref 135–145)
TOTAL PROTEIN: 8 G/DL (ref 6.1–8)
TROPONIN T: < 0.01 NG/ML
WBC # BLD: 6.8 THOU/MM3 (ref 4.8–10.8)

## 2022-01-22 PROCEDURE — 85025 COMPLETE CBC W/AUTO DIFF WBC: CPT

## 2022-01-22 PROCEDURE — 87804 INFLUENZA ASSAY W/OPTIC: CPT

## 2022-01-22 PROCEDURE — 87635 SARS-COV-2 COVID-19 AMP PRB: CPT

## 2022-01-22 PROCEDURE — 96365 THER/PROPH/DIAG IV INF INIT: CPT

## 2022-01-22 PROCEDURE — 2580000003 HC RX 258: Performed by: INTERNAL MEDICINE

## 2022-01-22 PROCEDURE — 93005 ELECTROCARDIOGRAM TRACING: CPT | Performed by: INTERNAL MEDICINE

## 2022-01-22 PROCEDURE — 6370000000 HC RX 637 (ALT 250 FOR IP): Performed by: INTERNAL MEDICINE

## 2022-01-22 PROCEDURE — 94640 AIRWAY INHALATION TREATMENT: CPT

## 2022-01-22 PROCEDURE — 83880 ASSAY OF NATRIURETIC PEPTIDE: CPT

## 2022-01-22 PROCEDURE — 84484 ASSAY OF TROPONIN QUANT: CPT

## 2022-01-22 PROCEDURE — 80053 COMPREHEN METABOLIC PANEL: CPT

## 2022-01-22 PROCEDURE — 93010 ELECTROCARDIOGRAM REPORT: CPT | Performed by: NUCLEAR MEDICINE

## 2022-01-22 PROCEDURE — 99284 EMERGENCY DEPT VISIT MOD MDM: CPT

## 2022-01-22 PROCEDURE — 6360000002 HC RX W HCPCS: Performed by: INTERNAL MEDICINE

## 2022-01-22 PROCEDURE — 71045 X-RAY EXAM CHEST 1 VIEW: CPT

## 2022-01-22 RX ORDER — IPRATROPIUM BROMIDE AND ALBUTEROL SULFATE 2.5; .5 MG/3ML; MG/3ML
1 SOLUTION RESPIRATORY (INHALATION) ONCE
Status: COMPLETED | OUTPATIENT
Start: 2022-01-22 | End: 2022-01-22

## 2022-01-22 RX ORDER — GUAIFENESIN 600 MG/1
600 TABLET, EXTENDED RELEASE ORAL 2 TIMES DAILY
Qty: 30 TABLET | Refills: 0 | Status: SHIPPED | OUTPATIENT
Start: 2022-01-22 | End: 2022-02-06

## 2022-01-22 RX ORDER — IPRATROPIUM BROMIDE AND ALBUTEROL SULFATE 2.5; .5 MG/3ML; MG/3ML
1 SOLUTION RESPIRATORY (INHALATION)
Status: DISCONTINUED | OUTPATIENT
Start: 2022-01-22 | End: 2022-01-22

## 2022-01-22 RX ADMIN — CASIRIVIMAB AND IMDEVIMAB: 600; 600 INJECTION, SOLUTION, CONCENTRATE INTRAVENOUS at 12:58

## 2022-01-22 RX ADMIN — IPRATROPIUM BROMIDE AND ALBUTEROL SULFATE 1 AMPULE: .5; 3 SOLUTION RESPIRATORY (INHALATION) at 15:12

## 2022-01-22 ASSESSMENT — PAIN DESCRIPTION - LOCATION: LOCATION: CHEST;HEAD

## 2022-01-22 ASSESSMENT — PAIN DESCRIPTION - PAIN TYPE: TYPE: ACUTE PAIN

## 2022-01-22 ASSESSMENT — PAIN DESCRIPTION - DESCRIPTORS: DESCRIPTORS: ACHING

## 2022-01-22 ASSESSMENT — PAIN SCALES - GENERAL: PAINLEVEL_OUTOF10: 6

## 2022-01-22 NOTE — ED PROVIDER NOTES
eMERGENCY dEPARTMENT eNCOUnter      [address]    Chief Complaint   Patient presents with    Cough    Shortness of Breath       HPI    Francisco J Jeong is a 59 y.o. male who presents to the emergency department because of cough and shortness of breath since December 27 of last year. Patient also has been complaining of fever. T-max was 102 yesterday evening. Patient also has been complaining some nausea and vomiting. Patient has 2 episodes of vomitus in the last 48 hours. Patient does not have any chest pain or chest pressure. There is no nausea or vomiting. There is no sore throat. Patient is not immunized against COVID and is a current smoker.     PAST MEDICAL HISTORY    Past Medical History:   Diagnosis Date    Bilateral inguinal hernia 12/14/2011    Chronic back pain     Cough     patient is a  has a chronic cough    Essential hypertension 04/01/2016    GERD (gastroesophageal reflux disease)     Osteoarthritis     Recurrent left inguinal hernia     Stomach ulcer        SURGICAL HISTORY    Past Surgical History:   Procedure Laterality Date    ARM SURGERY Left 03/2020    [facility]-Dr. Diana Olmedo    [facility]  2019    [facility]    EGD  2019    [facility]    HERNIA REPAIR      child    HERNIA REPAIR  01/03/2012    [place]-left indirect reducible inguinal hernia with mesh    HERNIA REPAIR Bilateral 04/28/2020    ROBOTIC LEFT and Right INGUINAL REPAIR WITH MESH performed by Althea Sapp MD at [facility]) Left 3/23/2021    RECURRENT LEFT INGUINAL HERNIA, REPAIR WITH MESH performed by Althea Sapp MD at [facility]  2005 x2    left-Dr. Ayo Edmond    TOOTH EXTRACTION  1997    VASECTOMY  2005    WRIST SURGERY  12/27/2018    left wirst        CURRENT MEDICATIONS    Current Outpatient Rx   Medication Sig Dispense Refill    ibuprofen (ADVIL;MOTRIN) 600 MG tablet Take 600 mg by mouth every 6 hours as needed for Pain      Pseudoeph-Doxylamine-DM-APAP (NYQUIL PO) Take by mouth      meloxicam (MOBIC) 15 MG tablet Take 1 tablet by mouth daily 90 tablet 1    lisinopril (PRINIVIL;ZESTRIL) 5 MG tablet Take 1 tablet by mouth daily 90 tablet 1       ALLERGIES    Allergies   Allergen Reactions    Sulfa Antibiotics Hives       FAMILY HISTORY    Family History   Problem Relation Age of Onset    Arthritis Mother     Heart Disease Mother     High Blood Pressure Mother     Arthritis Father     Cancer Father         prostate    Diabetes Father     Heart Disease Father     High Blood Pressure Father     Colon Cancer Father 80    Heart Disease Sister     High Blood Pressure Sister     Learning Disabilities Sister     Learning Disabilities Brother     High Blood Pressure Brother     Heart Disease Brother     Arthritis Brother     Cancer Brother         prostate       SOCIAL HISTORY    Social History     Socioeconomic History    Marital status:      Spouse name: Jolly    Number of children: 9    Years of education: 9    Highest education level: GED or equivalent   Occupational History    Occupation: [employer]     Employer: [employer]   Tobacco Use    Smoking status: Current Every Day Smoker     Packs/day: 0.50     Years: 40.00     Pack years: 20.00     Types: Cigarettes    Smokeless tobacco: Never Used    Tobacco comment: trying to cut down   Substance and Sexual Activity    Alcohol use: Yes     Alcohol/week: 0.0 standard drinks     Comment: occasionally    Drug use: No    Sexual activity: Yes   Other Topics Concern    None   Social History Narrative    None     Social Determinants of Health     Financial Resource Strain:     Difficulty of Paying Living Expenses: Not on file   Food Insecurity:     Worried About Running Out of Food in the Last Year: Not on file    Trent of Food in the Last Year: Not on file   Transportation Needs:     Lack of Transportation (Medical): Not on file    Lack of Transportation (Non-Medical):  Not on file   Physical Activity:     Days of Exercise per Week: Not on file    Minutes of Exercise per Session: Not on file   Stress:     Feeling of Stress : Not on file   Social Connections:     Frequency of Communication with Friends and Family: Not on file    Frequency of Social Gatherings with Friends and Family: Not on file    Attends Quaker Services: Not on file    Active Member of [address] [place] [address] or Organizations: Not on file    Attends Club or Organization Meetings: Not on file    Marital Status: Not on file   Intimate Partner Violence:     Fear of Current or Ex-Partner: Not on file    Emotionally Abused: Not on file    Physically Abused: Not on file    Sexually Abused: Not on file   Housing Stability:     Unable to Pay for Housing in the Last Year: Not on file    Number of [place] in the Last Year: Not on file    Unstable Housing in the Last Year: Not on file       REVIEW OF SYSTEMS    Constitutional:  Denies fever, chills, weight loss or weakness   Eyes:  Denies photophobia or discharge   HENT:  Denies sore throat or ear pain   Respiratory: Complaining of cough and shortness of breath   Cardiovascular:  Denies chest pain, palpitations or swelling   GI:  Denies abdominal pain, nausea, vomiting, or diarrhea   Musculoskeletal:  Denies back pain   Skin:  Denies rash   Neurologic:  Denies headache, focal weakness or sensory changes   Endocrine:  Denies polyuria or polydypsia   Lymphatic:  Denies swollen glands   Psychiatric:  Denies depression, suicidal ideation or homicidal ideation   All systems negative except as marked. PHYSICAL EXAM    VITAL SIGNS: /85   Pulse 81   Temp 97.9 °F (36.6 °C) (Oral)   Resp 20   Ht 5' 5\" (1.651 m)   Wt 150 lb (68 kg)   SpO2 97%   BMI 24.96 kg/m²    Constitutional:  Well developed, Well nourished, No acute distress, Non-toxic appearance.    HENT:  Normocephalic, Atraumatic, Bilateral external ears normal, Oropharynx moist, No oral exudates, Nose normal. Neck- Normal range of motion, No tenderness, Supple, No stridor. Eyes:  PERRL, EOMI, Conjunctiva normal, No discharge. Respiratory:  Normal breath sounds, No respiratory distress, No wheezing, No chest tenderness. Cardiovascular:  Normal heart rate, Normal rhythm, No murmurs, No rubs, No gallops. GI:  Bowel sounds normal, Soft, No tenderness, No masses, No pulsatile masses. : . No CVA tenderness. Musculoskeletal:  Intact distal pulses, No edema, No tenderness, No cyanosis, No clubbing. Good range of motion in all major joints. No tenderness to palpation or major deformities noted. Back- No tenderness. Integument:  Warm, Dry, No erythema, No rash. Lymphatic:  No lymphadenopathy noted. Neurologic:  Alert & oriented x 3, Normal motor function, Normal sensory function, No focal deficits noted. Psychiatric:  Affect normal, Judgment normal, Mood normal.     EKG    Normal sinus rhythm  Incomplete right bundle branch block  Ventricular rate 81  WY interval 192 ms  QRS duration 92 ms  QTc interval 404 ms    Labs   Labs Reviewed   COVID-19, RAPID - Abnormal; Notable for the following components:       Result Value    SARS-CoV-2, NAAT DETECTED (*)     All other components within normal limits   CBC WITH AUTO DIFFERENTIAL - Abnormal; Notable for the following components:    RDW-SD 45.3 (*)     MPV 9.3 (*)     All other components within normal limits   OSMOLALITY - Abnormal; Notable for the following components:    Osmolality Calc 274.5 (*)     All other components within normal limits   GLOMERULAR FILTRATION RATE, ESTIMATED - Abnormal; Notable for the following components:    Est, Glom Filt Rate 75 (*)     All other components within normal limits   RAPID INFLUENZA A/B ANTIGENS   COMPREHENSIVE METABOLIC PANEL   TROPONIN   ANION GAP   BRAIN NATRIURETIC PEPTIDE           RADIOLOGY      XR CHEST PORTABLE   Final Result      Left mid and bilateral lower lung atelectasis/infiltrate.                **This report has been created using voice recognition software. It may contain minor errors which are inherent in voice recognition technology. **      Final report electronically signed by Dr. Mari Shelton on 1/22/2022 12:05 PM            ED COURSE & MEDICAL DECISION MAKING    Pertinent Labs & Imaging studies reviewed. (See chart for details)  Patient is a current smoker he is a candidate for monoclonal antibiotic therapy which was given to him in the emergency department. Patient is maintaining his oxygenation on room air around 96%. All the patient's labs the testing were reviewed discussed the patient. Patient was given Regeneron in the emergency department. Patient will be discharged home with Mucinex and Combivent. Patient already has received a course of antibiotic and oral steroids.     FINAL IMPRESSION    1. ANNA Hannah MD  01/22/22 [id number]

## 2022-01-22 NOTE — ED NOTES
Patient resting in bed. Updated on plan of care. Denies any needs. Call light in reach.       Faviola To RN  01/22/22 [id number]

## 2022-01-22 NOTE — ED NOTES
Infusion still infusing. Patient denies any shortness of breath or any new symptoms. Nurse will continue to monitor for any adverse reactions. Call light in reach.       Ainsley Vega RN  01/22/22 2542

## 2022-01-22 NOTE — ED NOTES
Patient resting in bed. Denies any needs. Call light in reach. Will continue to monitor.       Emeli Mejias RN  01/22/22 [id number]

## 2022-01-22 NOTE — ED TRIAGE NOTES
Patient presents to ED with c/o headache, cough, and shortness of breath. States that he has been sick since 12/27/21 but tested negative for covid at that time. Also states that he has been running a fever and has been using tylenol and motrin to treat it. EKG completed. Call light in reach.

## 2022-01-22 NOTE — ACP (ADVANCE CARE PLANNING)
Advance Care Planning     Advance Care Planning Activator (Inpatient)  Conversation Note      Date of ACP Conversation: 1/22/2022     Conversation Conducted with: Patient with Decision Making Capacity    ACP Activator: 79-01 Inderjit Decision Maker:     Current Designated Health Care Decision Maker: Today we documented Decision Maker(s) consistent with ACP documents on file. Care Preferences    Ventilation: \"If you were in your present state of health and suddenly became very ill and were unable to breathe on your own, what would your preference be about the use of a ventilator (breathing machine) if it were available to you? \"      Would the patient desire the use of ventilator (breathing machine)?: yes    \"If your health worsens and it becomes clear that your chance of recovery is unlikely, what would your preference be about the use of a ventilator (breathing machine) if it were available to you? \"     Would the patient desire the use of ventilator (breathing machine)?: No      Resuscitation  \"CPR works best to restart the heart when there is a sudden event, like a heart attack, in someone who is otherwise healthy. Unfortunately, CPR does not typically restart the heart for people who have serious health conditions or who are very sick. \"    \"In the event your heart stopped as a result of an underlying serious health condition, would you want attempts to be made to restart your heart (answer \"yes\" for attempt to resuscitate) or would you prefer a natural death (answer \"no\" for do not attempt to resuscitate)? \" yes       [x] Yes   [] No   Educated Patient / Luzma Henninging regarding differences between Advance Directives and portable DNR orders.     Length of ACP Conversation in minutes:  39  Conversation Outcomes:  [x] ACP discussion completed  [] Existing advance directive reviewed with patient; no changes to patient's previously recorded wishes  [x] New Advance Directive completed  [] Portable Do Not Rescitate prepared for Provider review and signature  [] POLST/POST/MOLST/MOST prepared for Provider review and signature      Follow-up plan:    [] Schedule follow-up conversation to continue planning  [] Referred individual to Provider for additional questions/concerns   [x] Advised patient/agent/surrogate to review completed ACP document and update if needed with changes in condition, patient preferences or care setting    [] This note routed to one or more involved healthcare providers   Patient from home with wife; COVID (+) and receiving infusion. States he had POA done years ago  But no idea where it is. Agreed to update it today. OK with intubation \"but not too long\" doesn't want that to be the only thing keeping him alive if there is no improvement in condition. Discussed talking to his wife about his wishes as well as getting a DNR in writing when he is ready to make that change .

## 2022-01-24 ENCOUNTER — TELEPHONE (OUTPATIENT)
Dept: FAMILY MEDICINE CLINIC | Age: 62
End: 2022-01-24

## 2022-01-24 ENCOUNTER — CARE COORDINATION (OUTPATIENT)
Dept: CARE COORDINATION | Age: 62
End: 2022-01-24

## 2022-01-24 ASSESSMENT — ENCOUNTER SYMPTOMS
RHINORRHEA: 1
COUGH: 1

## 2022-01-24 NOTE — PROGRESS NOTES
[address]  [address] [place] [address]  Phone:  [phone]       2022    TELEHEALTH EVALUATION -- Audio/Visual (During QMGLL-82 public health emergency)    HPI:    Alicia Alexander (:  1960) has requested an audio/video evaluation for the following concern(s):  F/u COVID    Patient was seen in the ER on 22 with cough and SOB which he thinks were present since . On  he was tested for COVID and influenza which were negative. He had a fever on  with nausea and vomiting. No chest pain or pressure. He has not been vaccinated against COVID. He tested positive for COVID-19 as well as left mid and bilateral lower lobe infiltrates. He was treated with monoclonal antibodies in the ER and was discharged home on Mucinex and Combivent. He thinks he's getting better. He continues to cough and it's productive of phlegm. No hemoptysis. He gets [place]. No SOB at rest.  His lowest pulse ox was 95% on RA. No chest pain or palpitations. He's been a little nauseous (worse with coughing). No diarrhea. He hasn't smoked a cigarette in 2 days and the day before he only had 1/2 one. Review of Systems   Constitutional: Positive for chills, fatigue and fever. HENT: Positive for congestion and rhinorrhea. Negative for sore throat. Respiratory: Positive for cough and shortness of breath (WILLOUGHBY). Cardiovascular: Negative for chest pain and palpitations. Gastrointestinal: Positive for nausea. Negative for diarrhea. Genitourinary: Negative for decreased urine volume. Neurological: Positive for headaches. Negative for dizziness and light-headedness. Prior to Visit Medications    Medication Sig Taking?  Authorizing Provider   albuterol-ipratropium (COMBIVENT RESPIMAT)  MCG/ACT AERS inhaler Inhale 1 puff into the lungs every 6 hours as needed for Real MD Marita   guaiFENesin (MUCINEX) 600 MG extended release tablet Take 1 tablet by mouth 2 times daily for 15 days  Jacklyn Hyman MD   ibuprofen (ADVIL;MOTRIN) 600 MG tablet Take 600 mg by mouth every 6 hours as needed for Pain  Historical Provider, MD   Pseudoepconsuelo-Doxylamine-DM-APAP (NYQUIL PO) Take by mouth  Historical Provider, MD   meloxicam (MOBIC) 15 MG tablet Take 1 tablet by mouth daily  Prema Pouch, MD   lisinopril (PRINIVIL;ZESTRIL) 5 MG tablet Take 1 tablet by mouth daily  Prema Pouch, MD   lisinopril (PRINIVIL;ZESTRIL) 5 MG tablet Take 1 tablet by mouth daily  Prema Pouch, MD       Social History     Tobacco Use    Smoking status: Current Every Day Smoker     Packs/day: 0.50     Years: 40.00     Pack years: 20.00     Types: Cigarettes    Smokeless tobacco: Never Used    Tobacco comment: trying to cut down   Substance Use Topics    Alcohol use: Yes     Alcohol/week: 0.0 standard drinks     Comment: occasionally    Drug use: No        Allergies   Allergen Reactions    Sulfa Antibiotics Hives   ,   Past Medical History:   Diagnosis Date    Bilateral inguinal hernia 12/14/2011    Chronic back pain     Cough     patient is a  has a chronic cough    Essential hypertension 04/01/2016    GERD (gastroesophageal reflux disease)     Osteoarthritis     Recurrent left inguinal hernia     Stomach ulcer        PHYSICAL EXAMINATION:    Constitutional: [x] Appears well-developed and well-nourished [x] No apparent distress      [] Abnormal-   Mental status  [x] Alert and awake  [] Oriented to person/place/time []Able to follow commands      Eyes:  EOM    [x]  Normal  [] Abnormal-  Sclera  [x]  Normal  [] Abnormal -         Discharge [x]  None visible  [] Abnormal -    HENT:   [x] Normocephalic, atraumatic.   [] Abnormal   [x] Mouth/Throat: Mucous membranes are moist.     External Ears [] Normal  [] Abnormal-     Neck: [] No visualized mass     Pulmonary/Chest: [x] Respiratory effort normal.  [x] No visualized signs of difficulty breathing or respiratory distress [] Abnormal-      Musculoskeletal:   [] Normal gait with no signs of ataxia         [] Normal range of motion of neck        [] Abnormal-       Neurological:        [] No Facial Asymmetry (Cranial nerve 7 motor function) (limited exam to video visit)          [] No gaze palsy        [] Abnormal-         Skin:        [] No significant exanthematous lesions or discoloration noted on facial skin         [] Abnormal-            Psychiatric:       [] Normal Affect [] No Hallucinations        [] Abnormal-       ASSESSMENT/PLAN:  1. COVID-19  - He is slowly feeling better each day. He was encouraged to progress his diet as tolerated and increase his fluid intake. Rest but frequent ambulation were advised. May use OTC symptomatic medication PRN. Return if symptoms worsen or fail to improve. Zadie Gaucher is a 59 y.o. male being evaluated by a Virtual Visit (video visit) encounter to address concerns as mentioned above. A caregiver was present when appropriate. Due to this being a TeleHealth encounter (During [facility]- public health emergency), evaluation of the following organ systems was limited: Vitals/Constitutional/EENT/Resp/CV/GI//MS/Neuro/Skin/Heme-Lymph-Imm. Pursuant to the emergency declaration under the [facility], [address] [place] [address] authority and the [employer] and [employer] Act, this Virtual Visit was conducted with patient's (and/or legal guardian's) consent, to reduce the patient's risk of exposure to COVID-19 and provide necessary medical care. The patient (and/or legal guardian) has also been advised to contact this office for worsening conditions or problems, and seek emergency medical treatment and/or call 911 if deemed necessary. Patient identification was verified at the start of the visit: Yes    Services were provided through a video synchronous discussion virtually to substitute for in-person clinic visit. Patient and provider were located at their individual homes. --Justin Contreras MD on 1/25/2022 at 11:56 AM    An electronic signature was used to authenticate this note.

## 2022-01-24 NOTE — CARE COORDINATION
Ambulatory Care Coordination ED COVID Follow up Call    Challenges to be reviewed by the provider   Additional needs identified to be addressed with provider: No  none                 Encounter was not routed to provider for escalation. Method of communication with provider: phone    Discussed [phone] related testing which was: available at this time. Test results were: positive. Patient informed of results, if available? n/a. Current Symptoms: no new symptoms, no worsening symptoms and chest congestion, hoarseness, headache    Reviewed New or Changed Meds: yes Combivent Respimat, Mucinex    Do you have what you need at home?  Durable Medical Equipment ordered at discharge: None   Home Health/Outpatient orders at discharge: none   Was patient discharged with a pulse oximeter? No Discussed and confirmed pulse oximeter discharge instructions and when to notify provider or seek emergency care. Patient education provided: Reviewed appropriate site of care based on symptoms and resources available to patient including: PCP, Urgent care clinics and discussed arranging appt as pt reports that he needs to get re established wtih new provider. .     Follow up appointment recommended: yes. If no appointment scheduled, scheduling offered: yes  No future appointments. Interventions: Obtained and reviewed discharge summary and/or continuity of care documents  Reviewed discharge instructions, medical action plan and red flags with patient who verbalized understanding. Plan for follow-up call in 5-7 days based on severity of symptoms and risk factors. Plan for next call: symptom management-reviewed  self management-reviewed  follow up appointment-message routed to   Provided contact information for future needs. Pt stable at this time. Reports some of his s/s have improved. No concerns at this time. Received antibody infusion in ED.    Stephanie Dunbar RN

## 2022-01-24 NOTE — ACP (ADVANCE CARE PLANNING)
Advance Care Planning   Healthcare Decision Maker:    Primary Decision Maker: Nichelle Prince - [facility] - [phone]    Click here to complete Healthcare Decision Makers including selection of the Healthcare Decision Maker Relationship (ie \"Primary\"). Today we documented Decision Maker(s) consistent with Legal Next of Kin hierarchy.

## 2022-01-24 NOTE — CARE COORDINATION
Pt needs VV appt arranged for ED COVID f/u. Pt stable. Received antibody infusion in ED and has Combivent Respimat inhaler and Mucinex. No new concerns at this time. Pt wishes to stay with [address]. Plans on getting established with Dr. Reed Heimlich once he can be seen in person at the office. Please advise regarding who VV appt needs to be arranged with.

## 2022-01-25 ENCOUNTER — VIRTUAL VISIT (OUTPATIENT)
Dept: FAMILY MEDICINE CLINIC | Age: 62
End: 2022-01-25
Payer: COMMERCIAL

## 2022-01-25 DIAGNOSIS — U07.1 COVID-19: Primary | ICD-10-CM

## 2022-01-25 PROCEDURE — G8428 CUR MEDS NOT DOCUMENT: HCPCS | Performed by: FAMILY MEDICINE

## 2022-01-25 PROCEDURE — 99213 OFFICE O/P EST LOW 20 MIN: CPT | Performed by: FAMILY MEDICINE

## 2022-01-25 PROCEDURE — 3017F COLORECTAL CA SCREEN DOC REV: CPT | Performed by: FAMILY MEDICINE

## 2022-01-25 ASSESSMENT — ENCOUNTER SYMPTOMS
DIARRHEA: 0
SORE THROAT: 0
NAUSEA: 1
SHORTNESS OF BREATH: 1

## 2022-01-31 ENCOUNTER — CARE COORDINATION (OUTPATIENT)
Dept: CARE COORDINATION | Age: 62
End: 2022-01-31

## 2022-01-31 NOTE — CARE COORDINATION
Follow Up Call    Challenges to be reviewed by the provider   Additional needs identified to be addressed with provider: No  none                 Encounter was not routed to provider for escalation. Method of communication with provider:  phone. Contacted the patient by telephone to follow up after ED. Status: significantly improved  Interventions to address identified needs: Obtained and reviewed discharge summary and/or continuity of care documents    [facility] follow up appointment(s): No future appointments. Non-[facility] follow up appointment(s): n/a   Follow up appointment completed? Yes. Provided contact information for future needs. No further follow-up call indicated based on severity of symptoms and risk factors.   Plan for next call: Jefferson Stewart RN

## 2022-03-28 ENCOUNTER — OFFICE VISIT (OUTPATIENT)
Dept: FAMILY MEDICINE CLINIC | Age: 62
End: 2022-03-28
Payer: COMMERCIAL

## 2022-03-28 VITALS
WEIGHT: 153 LBS | DIASTOLIC BLOOD PRESSURE: 70 MMHG | HEIGHT: 67 IN | HEART RATE: 88 BPM | RESPIRATION RATE: 16 BRPM | BODY MASS INDEX: 24.01 KG/M2 | SYSTOLIC BLOOD PRESSURE: 128 MMHG

## 2022-03-28 DIAGNOSIS — M15.9 PRIMARY OSTEOARTHRITIS INVOLVING MULTIPLE JOINTS: ICD-10-CM

## 2022-03-28 DIAGNOSIS — I10 PRIMARY HYPERTENSION: ICD-10-CM

## 2022-03-28 DIAGNOSIS — M25.551 ARTHRALGIA OF RIGHT HIP: ICD-10-CM

## 2022-03-28 DIAGNOSIS — K21.9 GASTROESOPHAGEAL REFLUX DISEASE WITHOUT ESOPHAGITIS: ICD-10-CM

## 2022-03-28 DIAGNOSIS — M54.50 ACUTE RIGHT-SIDED LOW BACK PAIN WITHOUT SCIATICA: ICD-10-CM

## 2022-03-28 DIAGNOSIS — Z00.00 WELL ADULT EXAM: Primary | ICD-10-CM

## 2022-03-28 DIAGNOSIS — R35.1 NOCTURIA: ICD-10-CM

## 2022-03-28 PROCEDURE — G8484 FLU IMMUNIZE NO ADMIN: HCPCS | Performed by: NURSE PRACTITIONER

## 2022-03-28 PROCEDURE — 99396 PREV VISIT EST AGE 40-64: CPT | Performed by: NURSE PRACTITIONER

## 2022-03-28 RX ORDER — MELOXICAM 15 MG/1
15 TABLET ORAL DAILY
Qty: 90 TABLET | Refills: 3 | Status: SHIPPED | OUTPATIENT
Start: 2022-03-28 | End: 2023-03-23

## 2022-03-28 RX ORDER — LISINOPRIL 5 MG/1
5 TABLET ORAL DAILY
Qty: 90 TABLET | Refills: 3 | Status: SHIPPED | OUTPATIENT
Start: 2022-03-28 | End: 2023-03-23

## 2022-03-28 RX ORDER — OMEPRAZOLE 20 MG/1
20 CAPSULE, DELAYED RELEASE ORAL DAILY
Qty: 90 CAPSULE | Refills: 3 | Status: SHIPPED | OUTPATIENT
Start: 2022-03-28 | End: 2023-03-23

## 2022-03-28 SDOH — ECONOMIC STABILITY: FOOD INSECURITY: WITHIN THE PAST 12 MONTHS, YOU WORRIED THAT YOUR FOOD WOULD RUN OUT BEFORE YOU GOT MONEY TO BUY MORE.: NEVER TRUE

## 2022-03-28 SDOH — ECONOMIC STABILITY: FOOD INSECURITY: WITHIN THE PAST 12 MONTHS, THE FOOD YOU BOUGHT JUST DIDN'T LAST AND YOU DIDN'T HAVE MONEY TO GET MORE.: NEVER TRUE

## 2022-03-28 ASSESSMENT — PATIENT HEALTH QUESTIONNAIRE - PHQ9
SUM OF ALL RESPONSES TO PHQ9 QUESTIONS 1 & 2: 0
1. LITTLE INTEREST OR PLEASURE IN DOING THINGS: 0
SUM OF ALL RESPONSES TO PHQ QUESTIONS 1-9: 0
2. FEELING DOWN, DEPRESSED OR HOPELESS: 0

## 2022-03-28 ASSESSMENT — ENCOUNTER SYMPTOMS
EYE ITCHING: 0
WHEEZING: 0
DIARRHEA: 0
ABDOMINAL PAIN: 0
BLOOD IN STOOL: 0
EYE REDNESS: 0
COLOR CHANGE: 0
SINUS PRESSURE: 0
EYE DISCHARGE: 0
BACK PAIN: 1
SHORTNESS OF BREATH: 0
CONSTIPATION: 0
EYE PAIN: 0
COUGH: 0

## 2022-03-28 ASSESSMENT — SOCIAL DETERMINANTS OF HEALTH (SDOH): HOW HARD IS IT FOR YOU TO PAY FOR THE VERY BASICS LIKE FOOD, HOUSING, MEDICAL CARE, AND HEATING?: NOT HARD AT ALL

## 2022-03-28 NOTE — PROGRESS NOTES
[facility] PROFESSIONAL [facility]  [address] [facility]  Dept: [phone]  Dept Fax: [phone]: [phone]     Visit Date:  3/28/2022    Patient:  Alicia Alexander  YOB: 1960  Age: 59 y.o. Gender: male  BMI: Body mass index is 24.32 kg/m². Alicia Alexander, Established patient, is being seen today for   Chief Complaint   Patient presents with   [place] Established New Doctor     est care, needs med refills, would like to be rx'd prilosec    . Assessment/Plan      1. Well adult exam  - Age-appropriate education provided. - Health maintenance reviewed. - Encourage healthy diet and regular exercise. 2. Primary hypertension  - Chronic, stable  - Continue lisinopril  - DASH diet, regular exercise encouraged  - lisinopril (PRINIVIL;ZESTRIL) 5 MG tablet; Take 1 tablet by mouth daily  Dispense: 90 tablet; Refill: 3    3. Gastroesophageal reflux disease without esophagitis  - Chronic, stable  - Continue omeprazole  -  Eat smaller meals, more often. -  Limit foods and drinks that seem to make condition worse. These foods may include chocolate, spicy foods, and sodas that have caffeine. Other high-acid foods are oranges and tomatoes. -  Try to eat at least 2 to 3 hours before bedtime. This helps lower the amount of acid in the stomach when you lay down. -  Nonpharmacologic treatments encouraged, including: eating smaller meals, elevation of the head of bed at night, avoidance of caffeine, chocolate, nicotine and peppermint, and avoiding tight fitting clothing.  - omeprazole (PRILOSEC) 20 MG delayed release capsule; Take 1 capsule by mouth daily  Dispense: 90 capsule; Refill: 3    4. Primary osteoarthritis involving multiple joints  - Chronic, stable  - Continue meloxicam  - Consider use of tylenol as needed  - Exercise encouraged  - meloxicam (MOBIC) 15 MG tablet; Take 1 tablet by mouth daily  Dispense: 90 tablet;  Refill: 3    5. Nocturia  - PSA, Prostatic Specific Antigen; Future    6. Acute right-sided low back pain without sciatica  - Symptoms most consistent with muscular injury  - Continue diclofenac  - Tylenol as needed  - Heat/ice as needed  - Okay to attempt chiropractic treatment    7. Arthralgia of right hip  - Symptoms most consistent with arthritis  - Continue diclofenac  - Tylenol as needed  - Heat/ice as needed  - Okay to attempt chiropractic treatment    Return in about 1 year (around 3/28/2023) for Annual Well Visit. HPI:     New patient. Former patient of Dr. Km Woodward and Dr. Malena Bolaños. Health maintenance reviewed. Medical history significant for essential hypertension. Right lower back pain. Started 2 months ago. No improving or worsening. Intermittent with specific movements and prolonged standing. Denies urinary symptoms. Hypertension  This is a chronic problem. The current episode started more than 1 year ago. The problem is unchanged. The problem is controlled. Pertinent negatives include no chest pain, headaches, neck pain, palpitations or shortness of breath. There are no associated agents to hypertension. Risk factors for coronary artery disease include male gender. Past treatments include ACE inhibitors. There are no compliance problems. Hip Pain   The incident occurred more than 1 week ago (6 months). There was no injury mechanism. The pain is present in the right hip. The quality of the pain is described as stabbing. The pain is at a severity of 0/10 (7/10 at its worst). The pain has been intermittent since onset. Pertinent negatives include no inability to bear weight, numbness or tingling. He reports no foreign bodies present. Exacerbated by: laying on the hip, positional. He has tried NSAIDs for the symptoms.      PHQ-9 Total Score: 0 (3/28/2022 10:55 AM)      Medications    Current Outpatient Medications:     omeprazole (PRILOSEC) 20 MG delayed release capsule, Take 1 capsule by mouth daily, Disp: 90 capsule, Rfl: 3    meloxicam (MOBIC) 15 MG tablet, Take 1 tablet by mouth daily, Disp: 90 tablet, Rfl: 3    lisinopril (PRINIVIL;ZESTRIL) 5 MG tablet, Take 1 tablet by mouth daily, Disp: 90 tablet, Rfl: 3    The patient is allergic to sulfa antibiotics. Past Medical History  Maxine Talley  has a past medical history of Bilateral inguinal hernia, Chronic back pain, Cough, Essential hypertension, GERD (gastroesophageal reflux disease), Osteoarthritis, Recurrent left inguinal hernia, and Stomach ulcer. Past Surgical History  The patient  has a past surgical history that includes Rotator cuff repair (2005 x2); hernia repair; hernia repair (01/03/2012); Vasectomy (2005); Tooth Extraction (1997); Colonoscopy (2019); EGD (2019); Wrist surgery (12/27/2018); Arm Surgery (Left, 03/2020); hernia repair (Bilateral, 04/28/2020); and hernia repair (Left, 3/23/2021). Family History  This patient's family history includes Arthritis in his brother, father, and mother; Cancer in his brother and father; Colon Cancer (age of onset: 80) in his father; Diabetes in his father; Heart Disease in his brother, father, mother, and sister; High Blood Pressure in his brother, father, mother, and sister; Learning Disabilities in his brother and sister. Social History  Maxine Talley  reports that he has been smoking cigarettes. He has a 20.00 pack-year smoking history. He has never used smokeless tobacco. He reports current alcohol use. He reports that he does not use drugs. Health Maintenance:    Health maintenance reviewed.    Health Maintenance   Topic Date Due    COVID-19 Vaccine (1) Never done    Depression Screen  Never done    HIV screen  Never done    DTaP/Tdap/Td vaccine (1 - Tdap) Never done    Shingles Vaccine (1 of 2) Never done    Low dose CT lung screening  Never done    Flu vaccine (1) 09/01/2021    Potassium monitoring  01/22/2023    Creatinine monitoring  01/22/2023    Pneumococcal 0-64 years Vaccine (2 of 2 - PPSV23) 04/18/2023    Lipid screen  03/18/2026    Colorectal Cancer Screen  01/31/2029    Hepatitis A vaccine  Aged Out    Hepatitis B vaccine  Aged Out    Hib vaccine  Aged Out    Meningococcal (ACWY) vaccine  Aged Out    Hepatitis C screen  Discontinued       Subjective/Objective:      Review of Systems   Constitutional: Negative for chills, fatigue and fever. HENT: Negative for congestion, ear pain, sinus pressure and sneezing. Eyes: Negative for pain, discharge, redness and itching. Respiratory: Negative for cough, shortness of breath and wheezing. Cardiovascular: Negative for chest pain, palpitations and leg swelling. Gastrointestinal: Negative for abdominal pain, blood in stool, constipation and diarrhea. Endocrine: Negative for polydipsia, polyphagia and polyuria. Genitourinary: Negative for difficulty urinating and hematuria. Musculoskeletal: Positive for arthralgias and back pain. Negative for neck pain. Skin: Negative for color change, pallor and rash. Allergic/Immunologic: Negative for environmental allergies and food allergies. Neurological: Negative for dizziness, tingling, light-headedness, numbness and headaches. Psychiatric/Behavioral: Negative for agitation and confusion. The patient is not nervous/anxious. /70   Pulse 88   Resp 16   Ht 5' 6.5\" (1.689 m)   Wt 153 lb (69.4 kg)   BMI 24.32 kg/m²     Physical Exam  Vitals and nursing note reviewed. Constitutional:       Appearance: He is well-developed. HENT:      Head: Normocephalic and atraumatic. Right Ear: Hearing, tympanic membrane, ear canal and external ear normal.      Left Ear: Hearing, tympanic membrane, ear canal and external ear normal.      Nose:      Right Sinus: No maxillary sinus tenderness or frontal sinus tenderness. Left Sinus: No maxillary sinus tenderness or frontal sinus tenderness. Eyes:      General:         Right eye: No discharge.          Left eye: No discharge. Conjunctiva/sclera: Conjunctivae normal.      Pupils: Pupils are equal, round, and reactive to light. Neck:      Vascular: No JVD. Cardiovascular:      Rate and Rhythm: Normal rate and regular rhythm. Heart sounds: Normal heart sounds. No murmur heard. Pulmonary:      Effort: Pulmonary effort is normal. No tachypnea. Breath sounds: Normal breath sounds. No stridor. No wheezing. Abdominal:      General: There is no distension. Tenderness: There is no abdominal tenderness. Musculoskeletal:         General: No deformity. Cervical back: Normal range of motion. No swelling, edema, erythema, signs of trauma, spasms, tenderness or bony tenderness. Back:       Comments: ROM in all extremities WNL. No deformities. Lymphadenopathy:      Head:      Right side of head: No submental or submandibular adenopathy. Left side of head: No submental or submandibular adenopathy. Skin:     General: Skin is warm and dry. Capillary Refill: Capillary refill takes less than 2 seconds. Findings: No rash. Neurological:      Mental Status: He is alert and oriented to person, place, and time. Coordination: Coordination normal.   Psychiatric:         Mood and Affect: Mood normal.         Behavior: Behavior normal.         Thought Content: Thought content normal.         Judgment: Judgment normal.           Labs Reviewed 3/28/2022:    Lab Results   Component Value Date    WBC 6.8 01/22/2022    HGB 14.7 01/22/2022    HCT 44.3 01/22/2022     01/22/2022    CHOL 133 03/18/2021    TRIG 46 03/18/2021    HDL 44 03/18/2021    ALT 51 01/22/2022    AST 33 01/22/2022     01/22/2022    K 4.7 01/22/2022    CL 99 01/22/2022    CREATININE 1.0 01/22/2022    BUN 13 01/22/2022    CO2 27 01/22/2022    INR 1.01 04/12/2018           Patient given educational materials - see patient instructions. Discussed use, benefit, and side effects of prescribed medications.   All patient questions answered. Pt voiced understanding. Reviewed health maintenance.        Electronically signed by FREDIS Cuevas CNP on 3/28/2022 at 11:01 AM EDT

## 2022-03-28 NOTE — PATIENT INSTRUCTIONS
Patient Education        Well Visit, Men 48 to 72: Care Instructions  Overview     Well visits can help you stay healthy. Your doctor has checked your overall health and may have suggested ways to take good care of yourself. Your doctor also may have recommended tests. At home, you can help prevent illness with healthy eating, regular exercise, and other steps. Follow-up care is a key part of your treatment and safety. Be sure to make and go to all appointments, and call your doctor if you are having problems. It's also a good idea to know your test results and keep a list of the medicines you take. How can you care for yourself at home? · Get screening tests that you and your doctor decide on. Screening helps find diseases before any symptoms appear. · Eat healthy foods. Choose fruits, vegetables, whole grains, protein, and low-fat dairy foods. Limit fat, especially saturated fat. Reduce salt in your diet. · Limit alcohol. Have no more than 2 drinks a day or 14 drinks a week. · Get at least 30 minutes of exercise on most days of the week. Walking is a good choice. You also may want to do other activities, such as running, swimming, cycling, or playing tennis or team sports. · Reach and stay at a healthy weight. This will lower your risk for many problems, such as obesity, diabetes, heart disease, and high blood pressure. · Do not smoke. Smoking can make health problems worse. If you need help quitting, talk to your doctor about stop-smoking programs and medicines. These can increase your chances of quitting for good. · Care for your mental health. It is easy to get weighed down by worry and stress. Learn strategies to manage stress, like deep breathing and mindfulness, and stay connected with your family and community. If you find you often feel sad or hopeless, talk with your doctor. Treatment can help.   · Talk to your doctor about whether you have any risk factors for sexually transmitted infections (STIs). You can help prevent STIs if you wait to have sex with a new partner (or partners) until you've each been tested for STIs. It also helps if you use condoms (male or female condoms) and if you limit your sex partners to one person who only has sex with you. Vaccines are available for some STIs. · If it's important to you to prevent pregnancy with your partner, talk with your doctor about birth control options that might be best for you. · If you think you may have a problem with alcohol or drug use, talk to your doctor. This includes prescription medicines (such as amphetamines and opioids) and illegal drugs (such as cocaine and methamphetamine). Your doctor can help you figure out what type of treatment is best for you. · Protect your skin from too much sun. When you're outdoors from 10 a.m. to 4 p.m., stay in the shade or cover up with clothing and a hat with a wide brim. Wear sunglasses that block UV rays. Even when it's cloudy, put broad-spectrum sunscreen (SPF 30 or higher) on any exposed skin. · See a dentist one or two times a year for checkups and to have your teeth cleaned. · Wear a seat belt in the car. When should you call for help? Watch closely for changes in your health, and be sure to contact your doctor if you have any problems or symptoms that concern you. Where can you learn more? Go to https://Vanna's Vanitydudley.health-partners. org and sign in to your SmallRivers account. Enter W866 in the [facility] box to learn more about \"Well Visit, Men 48 to 72: Care Instructions. \"     If you do not have an account, please click on the \"Sign Up Now\" link. Current as of: October 6, 2021               Content Version: 13.1  © 2006-2021 Healthwise, Incorporated. Care instructions adapted under license by [facility] ([facility]).  If you have questions about a medical condition or this instruction, always ask your healthcare professional. [address]  any warranty or liability for your use of this information. Patient Education        High Blood Pressure: Care Instructions  Overview     It's normal for blood pressure to go up and down throughout the day. But if it stays up, you have high blood pressure. Another name for high blood pressure is hypertension. Despite what a lot of people think, high blood pressure usually doesn't cause headaches or make you feel dizzy or lightheaded. It usually has no symptoms. But it does increase your risk of stroke, heart attack, and other problems. You and your doctor will talk about your risks of these problems based on your blood pressure. Your doctor will give you a goal for your blood pressure. Your goal will be based on your health and your age. Lifestyle changes, such as eating healthy and being active, are always important to help lower blood pressure. You might also take medicine to reach your blood pressure goal.  Follow-up care is a key part of your treatment and safety. Be sure to make and go to all appointments, and call your doctor if you are having problems. It's also a good idea to know your test results and keep a list of the medicines you take. How can you care for yourself at home? Medical treatment  · If you stop taking your medicine, your blood pressure will go back up. You may take one or more types of medicine to lower your blood pressure. Be safe with medicines. Take your medicine exactly as prescribed. Call your doctor if you think you are having a problem with your medicine. · Talk to your doctor before you start taking aspirin every day. Aspirin can help certain people lower their risk of a heart attack or stroke. But taking aspirin isn't right for everyone, because it can cause serious bleeding. · See your doctor regularly. You may need to see the doctor more often at first or until your blood pressure comes down.   · If you are taking blood pressure medicine, talk to your doctor before you take decongestants or anti-inflammatory medicine, such as ibuprofen. Some of these medicines can raise blood pressure. · Learn how to check your blood pressure at home. Lifestyle changes  · Stay at a healthy weight. This is especially important if you put on weight around the waist. Losing even 10 pounds can help you lower your blood pressure. · If your doctor recommends it, get more exercise. Walking is a good choice. Bit by bit, increase the amount you walk every day. Try for at least 30 minutes on most days of the week. You also may want to swim, bike, or do other activities. · Avoid or limit alcohol. Talk to your doctor about whether you can drink any alcohol. · Try to limit how much sodium you eat to less than 2,300 milligrams (mg) a day. Your doctor may ask you to try to eat less than 1,500 mg a day. · Eat plenty of fruits (such as bananas and oranges), vegetables, legumes, whole grains, and low-fat dairy products. · Lower the amount of saturated fat in your diet. Saturated fat is found in animal products such as milk, cheese, and meat. Limiting these foods may help you lose weight and also lower your risk for heart disease. · Do not smoke. Smoking increases your risk for heart attack and stroke. If you need help quitting, talk to your doctor about stop-smoking programs and medicines. These can increase your chances of quitting for good. When should you call for help? Call 911  anytime you think you may need emergency care. This may mean having symptoms that suggest that your blood pressure is causing a serious heart or blood vessel problem. Your blood pressure may be over 180/120. For example, call 911 if:    · You have symptoms of a heart attack. These may include:  ? Chest pain or pressure, or a strange feeling in the chest.  ? Sweating. ? Shortness of breath. ? Nausea or vomiting. ? Pain, pressure, or a strange feeling in the back, neck, jaw, or upper belly or in one or both shoulders or arms.   ? Lightheadedness or sudden weakness. ? A fast or irregular heartbeat.     · You have symptoms of a stroke. These may include:  ? Sudden numbness, tingling, weakness, or loss of movement in your face, arm, or leg, especially on only one side of your body. ? Sudden vision changes. ? Sudden trouble speaking. ? Sudden confusion or trouble understanding simple statements. ? Sudden problems with walking or balance. ? A sudden, severe headache that is different from past headaches.     · You have severe back or belly pain. Do not wait until your blood pressure comes down on its own. Get help right away. Call your doctor now or seek immediate care if:    · Your blood pressure is much higher than normal (such as 180/120 or higher), but you don't have symptoms.     · You think high blood pressure is causing symptoms, such as:  ? Severe headache.  ? Blurry vision. Watch closely for changes in your health, and be sure to contact your doctor if:    · Your blood pressure measures higher than your doctor recommends at least 2 times. That means the top number is higher or the bottom number is higher, or both.     · You think you may be having side effects from your blood pressure medicine. Where can you learn more? Go to https://Abacast.IPTEGO. org and sign in to your [employer] account. Enter T437 in the [employer] box to learn more about \"High Blood Pressure: Care Instructions. \"     If you do not have an account, please click on the \"Sign Up Now\" link. Current as of: April 29, 2021               Content Version: 13.1  © 2006-2021 Healthwise, Incorporated. Care instructions adapted under license by [facility] ([facility]). If you have questions about a medical condition or this instruction, always ask your healthcare professional. [place] [id number] any warranty or liability for your use of this information.          Patient Education        DASH Diet: Care Instructions  Your Care Instructions     The DASH diet is an eating plan that can help lower your blood pressure. DASH stands for Dietary Approaches to Stop Hypertension. Hypertension is high blood pressure. The DASH diet focuses on eating foods that are high in calcium, potassium, and magnesium. These nutrients can lower blood pressure. The foods that are highest in these nutrients are fruits, vegetables, low-fat dairy products, nuts, seeds, and legumes. But taking calcium, potassium, and magnesium supplements instead of eating foods that are high in those nutrients does not have the same effect. The DASH diet also includes whole grains, fish, and poultry. The DASH diet is one of several lifestyle changes your doctor may recommend to lower your high blood pressure. Your doctor may also want you to decrease the amount of sodium in your diet. Lowering sodium while following the DASH diet can lower blood pressure even further than just the DASH diet alone. Follow-up care is a key part of your treatment and safety. Be sure to make and go to all appointments, and call your doctor if you are having problems. It's also a good idea to know your test results and keep a list of the medicines you take. How can you care for yourself at home? Following the DASH diet  · Eat 4 to 5 servings of fruit each day. A serving is 1 medium-sized piece of fruit, ½ cup chopped or canned fruit, 1/4 cup dried fruit, or 4 ounces (½ cup) of fruit juice. Choose fruit more often than fruit juice. · Eat 4 to 5 servings of vegetables each day. A serving is 1 cup of lettuce or raw leafy vegetables, ½ cup of chopped or cooked vegetables, or 4 ounces (½ cup) of vegetable juice. Choose vegetables more often than vegetable juice. · Get 2 to 3 servings of low-fat and fat-free dairy each day. A serving is 8 ounces of milk, 1 cup of yogurt, or 1 ½ ounces of cheese. · Eat 6 to 8 servings of grains each day.  A serving is 1 slice of bread, 1 ounce of dry cereal, or ½ cup of cooked rice, pasta, or cooked cereal. Try to choose whole-grain products as much as possible. · Limit lean meat, poultry, and fish to 2 servings each day. A serving is 3 ounces, about the size of a deck of cards. · Eat 4 to 5 servings of nuts, seeds, and legumes (cooked dried beans, lentils, and split peas) each week. A serving is 1/3 cup of nuts, 2 tablespoons of seeds, or ½ cup of cooked beans or peas. · Limit fats and oils to 2 to 3 servings each day. A serving is 1 teaspoon of vegetable oil or 2 tablespoons of salad dressing. · Limit sweets and added sugars to 5 servings or less a week. A serving is 1 tablespoon jelly or jam, ½ cup sorbet, or 1 cup of lemonade. · Eat less than 2,300 milligrams (mg) of sodium a day. If you limit your sodium to 1,500 mg a day, you can lower your blood pressure even more. · Be aware that all of these are the suggested number of servings for people who eat 1,800 to 2,000 calories a day. Your recommended number of servings may be different if you need more or fewer calories. Tips for success  · Start small. Do not try to make dramatic changes to your diet all at once. You might feel that you are missing out on your favorite foods and then be more likely to not follow the plan. Make small changes, and stick with them. Once those changes become habit, add a few more changes. · Try some of the following:  ? Make it a goal to eat a fruit or vegetable at every meal and at snacks. This will make it easy to get the recommended amount of fruits and vegetables each day. ? Try yogurt topped with fruit and nuts for a snack or healthy dessert. ? Add lettuce, tomato, cucumber, and onion to sandwiches. ? Combine a ready-made pizza crust with low-fat mozzarella cheese and lots of vegetable toppings. Try using tomatoes, squash, spinach, broccoli, carrots, cauliflower, and onions. ? Have a variety of cut-up vegetables with a low-fat dip as an appetizer instead of chips and dip. ?  Sprinkle sunflower seeds or chopped almonds over salads. Or try adding chopped walnuts or almonds to cooked vegetables. ? Try some vegetarian meals using beans and peas. Add garbanzo or kidney beans to salads. Make burritos and tacos with mashed porras beans or black beans. Where can you learn more? Go to https://chpepiceweb.ihiji. org and sign in to your [employer] account. Enter E091 in the [employer] box to learn more about \"DASH Diet: Care Instructions. \"     If you do not have an account, please click on the \"Sign Up Now\" link. Current as of: April 29, 2021               Content Version: 13.1  © 2006-2021 Healthwise, Incorporated. Care instructions adapted under license by [facility] ([facility]). If you have questions about a medical condition or this instruction, always ask your healthcare professional. [place] [address] any warranty or liability for your use of this information. Patient Education        Back Stretches: Exercises  Introduction  Here are some examples of exercises for stretching your back. Start each exercise slowly. Ease off the exercise if you start to have pain. Your doctor or physical therapist will tell you when you can start these exercises and which ones will work best for you. How to do the exercises  Overhead stretch    1. Stand comfortably with your feet shoulder-width apart. 2. Looking straight ahead, raise both arms over your head and reach toward the ceiling. Do not allow your head to tilt back. 3. Hold for 15 to 30 seconds, then lower your arms to your sides. 4. Repeat 2 to 4 times. Side stretch    1. Stand comfortably with your feet shoulder-width apart. 2. Raise one arm over your head, and then lean to the other side. 3. Slide your hand down your leg as you let the weight of your arm gently stretch your side muscles. Hold for 15 to 30 seconds. 4. Repeat 2 to 4 times on each side. Press-up    1.  Lie on your stomach, supporting your body with your forearms. 2. Press your elbows down into the floor to raise your upper back. As you do this, relax your stomach muscles and allow your back to arch without using your back muscles. As your press up, do not let your hips or pelvis come off the floor. 3. Hold for 15 to 30 seconds, then relax. 4. Repeat 2 to 4 times. Relax and rest    1. Lie on your back with a rolled towel under your neck and a pillow under your knees. Extend your arms comfortably to your sides. 2. Relax and breathe normally. 3. Remain in this position for about 10 minutes. 4. If you can, do this 2 or 3 times each day. Follow-up care is a key part of your treatment and safety. Be sure to make and go to all appointments, and call your doctor if you are having problems. It's also a good idea to know your test results and keep a list of the medicines you take. Where can you learn more? Go to https://BR Supply.Millennium MusicMedia. org and sign in to your reBuy.de account. Enter D863 in the [employer] box to learn more about \"Back Stretches: Exercises. \"     If you do not have an account, please click on the \"Sign Up Now\" link. Current as of: July 1, 2021               Content Version: 13.1  © 2006-2021 Healthwise, Incorporated. Care instructions adapted under license by [facility] ([facility]). If you have questions about a medical condition or this instruction, always ask your healthcare professional. [place] [address] any warranty or liability for your use of this information. Patient Education        Acute Low Back Pain: Exercises  Introduction  Here are some examples of typical rehabilitation exercises for your condition. Start each exercise slowly. Ease off the exercise if you start to have pain. Your doctor or physical therapist will tell you when you can start these exercises and which ones will work best for you.   When you are not being active, find a comfortable position for rest. Some people are comfortable on the floor or a medium-firm bed with a small pillow under their head and another under their knees. Some people prefer to lie on their side with a pillow between their knees. Don't stay in one position for too long. Take short walks (10 to 20 minutes) every 2 to 3 hours. Avoid slopes, hills, and stairs until you feel better. Walk only distances you can manage without pain, especially leg pain. How to do the exercises  Back stretches    1. Get down on your hands and knees on the floor. 2. Relax your head and allow it to droop. Round your back up toward the ceiling until you feel a nice stretch in your upper, middle, and lower back. Hold this stretch for as long as it feels comfortable, or about 15 to 30 seconds. 3. Return to the starting position with a flat back while you are on your hands and knees. 4. Let your back sway by pressing your stomach toward the floor. Lift your buttocks toward the ceiling. 5. Hold this position for 15 to 30 seconds. 6. Repeat 2 to 4 times. Follow-up care is a key part of your treatment and safety. Be sure to make and go to all appointments, and call your doctor if you are having problems. It's also a good idea to know your test results and keep a list of the medicines you take. Where can you learn more? Go to https://Hipui.VocalizeLocal. org and sign in to your DocVue account. Enter Z971 in the [facility] box to learn more about \"Acute Low Back Pain: Exercises. \"     If you do not have an account, please click on the \"Sign Up Now\" link. Current as of: September 8, 2021               Content Version: 13.1  © 1676-9754 Healthwise, Incorporated. Care instructions adapted under license by [facility] [employer]by [facility] ([facility]). If you have questions about a medical condition or this instruction, always ask your healthcare professional. [address]  any warranty or liability for your use of this information.          Patient Education        Hip Arthritis: Exercises  Introduction  Here are some examples of exercises for you to try. The exercises may be suggested for a condition or for rehabilitation. Start each exercise slowly. Ease off the exercises if you start to have pain. You will be told when to start these exercises and which ones will work best for you. How to do the exercises  Straight-leg raises to the outside    5. Lie on your side, with your affected hip on top. 6. Tighten the front thigh muscles of your top leg to keep your knee straight. 7. Keep your hip and your leg straight in line with the rest of your body, and keep your knee pointing forward. Do not drop your hip back. 8. Lift your top leg straight up toward the ceiling, about 12 inches off the floor. Hold for about 6 seconds, then slowly lower your leg. 9. Repeat 8 to 12 times. 10. Switch legs and repeat steps 1 through 5, even if only one hip is sore. Straight-leg raises to the inside    5. Lie on your side with your affected hip on the floor. 6. You can either prop up your other leg on a chair, or you can bend that knee and put that foot in front of your other knee. Do not drop your hip back. 7. Tighten the muscles on the front thigh of your bottom leg to straighten that knee. 8. Keep your kneecap pointing forward and your leg straight, and lift your bottom leg up toward the ceiling about 6 inches. Hold for about 6 seconds, then lower slowly. 9. Repeat 8 to 12 times. 10. Switch legs and repeat steps 1 through 5, even if only one hip is sore. Hip hike    5. Stand sideways on the bottom step of a staircase, and hold on to the banister or wall. 6. Keeping both knees straight, lift your good leg off the step and let it hang down. Then hike your good hip up to the same level as your affected hip or a little higher. 7. Repeat 8 to 12 times. 8. Switch legs and repeat steps 1 through 3, even if only one hip is sore. Bridging    5. Lie on your back with both knees bent.  Your knees should be bent about 90 degrees. 6. Then push your feet into the floor, squeeze your buttocks, and lift your hips off the floor until your shoulders, hips, and knees are all in a straight line. 7. Hold for about 6 seconds as you continue to breathe normally, and then slowly lower your hips back down to the floor and rest for up to 10 seconds. 8. Repeat 8 to 12 times. Hamstring stretch (lying down)    1. Lie flat on your back with your legs straight. If you feel discomfort in your back, place a small towel roll under your lower back. 2. Holding the back of your affected leg, lift your leg straight up and toward your body until you feel a stretch at the back of your thigh. 3. Hold the stretch for at least 30 seconds. 4. Repeat 2 to 4 times. 5. Switch legs and repeat steps 1 through 4, even if only one hip is sore. Standing quadriceps stretch    1. If you are not steady on your feet, hold on to a chair, counter, or wall. You can also lie on your stomach or your side to do this exercise. 2. Bend the knee of the leg you want to stretch, and reach behind you to grab the front of your foot or ankle with the hand on the same side. For example, if you are stretching your right leg, use your right hand. 3. Keeping your knees next to each other, pull your foot toward your buttock until you feel a gentle stretch across the front of your hip and down the front of your thigh. Your knee should be pointed directly to the ground, and not out to the side. 4. Hold the stretch for at least 15 to 30 seconds. 5. Repeat 2 to 4 times. 6. Switch legs and repeat steps 1 through 5, even if only one hip is sore. Hip rotator stretch    1. Lie on your back with both knees bent and your feet flat on the floor. 2. Put the ankle of your affected leg on your opposite thigh near your knee. 3. Use your hand to gently push your knee away from your body until you feel a gentle stretch around your hip.   4. Hold the stretch for 15 to 30 seconds. 5. Repeat 2 to 4 times. 6. Repeat steps 1 through 5, but this time use your hand to gently pull your knee toward your opposite shoulder. 7. Switch legs and repeat steps 1 through 6, even if only one hip is sore. Knee-to-chest    1. Lie on your back with your knees bent and your feet flat on the floor. 2. Bring your affected leg to your chest, keeping the other foot flat on the floor (or keeping the other leg straight, whichever feels better on your lower back). 3. Keep your lower back pressed to the floor. Hold for at least 15 to 30 seconds. 4. Relax, and lower the knee to the starting position. 5. Repeat 2 to 4 times. 6. Switch legs and repeat steps 1 through 5, even if only one hip is sore. 7. To get more stretch, put your other leg flat on the floor while pulling your knee to your chest.  Clamshell    1. Lie on your side, with your affected hip on top. Keep your feet and knees together and your knees bent. 2. Raise your top knee, but keep your feet together. Do not let your hips roll back. Your legs should open up like a clamshell. 3. Hold for 6 seconds. 4. Slowly lower your knee back down. Rest for 10 seconds. 5. Repeat 8 to 12 times. 6. Switch legs and repeat steps 1 through 5, even if only one hip is sore. Follow-up care is a key part of your treatment and safety. Be sure to make and go to all appointments, and call your doctor if you are having problems. It's also a good idea to know your test results and keep a list of the medicines you take. Where can you learn more? Go to [email or web address].StyleJam. org and sign in to your Whim account. Enter X909 in the [employer] box to learn more about \"Hip Arthritis: Exercises. \"     If you do not have an account, please click on the \"Sign Up Now\" link. Current as of: July 1, 2021               Content Version: 13.1  © 9046-6679 Healthwise, Incorporated. Care instructions adapted under license by [facility] ([facility]).  If

## 2022-03-28 NOTE — LETTER
[address] Medicine  [address]  [facility] [address]  Phone: [phone]  Fax: [phone]    FREDIS Maguire CNP        April 25, 2022     [address] [place] [address]  [address] [facility] [address]      Dear Ana Mora: This letter is a reminder that your PSA test is due. Please call our office to schedule an appointment. If you've already underwent or scheduled this procedure, please disregard this notice.     Sincerely,        FREDIS Maguire CNP

## 2022-08-17 ENCOUNTER — HOSPITAL ENCOUNTER (OUTPATIENT)
Age: 62
Discharge: HOME | End: 2022-08-17
Payer: COMMERCIAL

## 2022-08-17 DIAGNOSIS — R35.1 NOCTURIA: ICD-10-CM

## 2022-08-17 PROCEDURE — 84153 ASSAY OF PSA TOTAL: CPT

## 2022-08-17 PROCEDURE — 36415 COLL VENOUS BLD VENIPUNCTURE: CPT

## 2022-08-18 ENCOUNTER — TELEPHONE (OUTPATIENT)
Dept: FAMILY MEDICINE CLINIC | Age: 62
End: 2022-08-18

## 2022-08-18 LAB — PROSTATE SPECIFIC ANTIGEN: 1.24 NG/ML (ref 0–1)

## 2022-08-18 NOTE — TELEPHONE ENCOUNTER
----- Message from FREDIS Villanueva CNP sent at 8/18/2022  7:59 AM EDT -----  PSA is okay. No additional testing necessary at this time. Recheck annually.

## 2022-09-08 ENCOUNTER — OFFICE VISIT (OUTPATIENT)
Dept: FAMILY MEDICINE CLINIC | Age: 62
End: 2022-09-08
Payer: COMMERCIAL

## 2022-09-08 VITALS
BODY MASS INDEX: 24.01 KG/M2 | OXYGEN SATURATION: 97 % | SYSTOLIC BLOOD PRESSURE: 138 MMHG | TEMPERATURE: 97.8 F | DIASTOLIC BLOOD PRESSURE: 88 MMHG | HEIGHT: 67 IN | HEART RATE: 61 BPM | WEIGHT: 153 LBS

## 2022-09-08 DIAGNOSIS — M54.50 ACUTE RIGHT-SIDED LOW BACK PAIN WITHOUT SCIATICA: ICD-10-CM

## 2022-09-08 DIAGNOSIS — F17.200 TOBACCO USE DISORDER: ICD-10-CM

## 2022-09-08 DIAGNOSIS — R05.3 POST-COVID CHRONIC COUGH: Primary | ICD-10-CM

## 2022-09-08 DIAGNOSIS — U09.9 POST-COVID CHRONIC COUGH: Primary | ICD-10-CM

## 2022-09-08 PROCEDURE — 4004F PT TOBACCO SCREEN RCVD TLK: CPT | Performed by: FAMILY MEDICINE

## 2022-09-08 PROCEDURE — G8420 CALC BMI NORM PARAMETERS: HCPCS | Performed by: FAMILY MEDICINE

## 2022-09-08 PROCEDURE — G8427 DOCREV CUR MEDS BY ELIG CLIN: HCPCS | Performed by: FAMILY MEDICINE

## 2022-09-08 PROCEDURE — 99214 OFFICE O/P EST MOD 30 MIN: CPT | Performed by: FAMILY MEDICINE

## 2022-09-08 PROCEDURE — 3017F COLORECTAL CA SCREEN DOC REV: CPT | Performed by: FAMILY MEDICINE

## 2022-09-08 RX ORDER — TIZANIDINE 4 MG/1
4 TABLET ORAL EVERY 6 HOURS PRN
Qty: 30 TABLET | Refills: 0 | Status: SHIPPED | OUTPATIENT
Start: 2022-09-08

## 2022-09-08 RX ORDER — ALBUTEROL SULFATE 90 UG/1
2 AEROSOL, METERED RESPIRATORY (INHALATION) 4 TIMES DAILY PRN
Qty: 8 G | Refills: 5 | Status: SHIPPED | OUTPATIENT
Start: 2022-09-08

## 2022-09-08 NOTE — PROGRESS NOTES
Pippa Baptiste (:  1960) is a 57 y.o. male,Established patient, here for evaluation of the following chief complaint(s):  Post-COVID Symptoms (Continued cough from COVID / patient complains of dizziness after having a coughing fit) and Back Pain (Lower back pain that sometimes radiates to the front)         ASSESSMENT/PLAN:  1. Post-COVID chronic cough  -     albuterol sulfate HFA (VENTOLIN HFA) 108 (90 Base) MCG/ACT inhaler; Inhale 2 puffs into the lungs 4 times daily as needed for Wheezing, Disp-8 g, R-5Normal  -     XR CHEST STANDARD (2 VW); Future  2. Tobacco use disorder  -     albuterol sulfate HFA (VENTOLIN HFA) 108 (90 Base) MCG/ACT inhaler; Inhale 2 puffs into the lungs 4 times daily as needed for Wheezing, Disp-8 g, R-5Normal  -     XR CHEST STANDARD (2 VW); Future  3. Acute right-sided low back pain without sciatica  -     tiZANidine (ZANAFLEX) 4 MG tablet; Take 1 tablet by mouth every 6 hours as needed (muscle spasm), Disp-30 tablet, R-0Normal    Will start with above  Smoking cessation  He doesn't want a daily inhaler at this point  A graduated exercise or breathing program may be helpful    Heating pad, epsom salt soaks, gentle stretches  Chiropractor okay    Return in about 3 months (around 2022). Subjective   SUBJECTIVE/OBJECTIVE:  HPI    New to clinic 3/2022. Dx [id number] 2022  Custody of 5 grandchildren. Cough since covid19  -- tried cough drops, and cough medication  -- has SOB with coughing, moist, deep. No feve/chlls. -- dizziness related to such. -- smoker  -- at night not bothered. CXR 2022 -- left mid and bilat LL atelectasis and infiltrate    Back pain  -- mainly with bowel movements on right side and then may go to the front. Sometimes when standing or working in garage. Few minutes of sharp pain. No radiation into legs. No weakness. BM not changed -- fairly soft daily. No N/V. Bladder doing okay. Goes frequently but this is not changed. H/o pinched sciatic nerve, mainly right side  H/o colonoscopy 2019 -- 5 years to follow up on polyps. GERD -- well controlled with PPI 20 mg.  OA mobic on board daily. HTN well cotnrolled    Working on quitting smoking. Skin tag in mouth upper lip. Getting larger. He will talk to ENT  and see if they remove -- we can referral if needed. Wt Readings from Last 3 Encounters:   09/08/22 153 lb (69.4 kg)   03/28/22 153 lb (69.4 kg)   01/22/22 150 lb (68 kg)     BP Readings from Last 3 Encounters:   09/08/22 138/88   03/28/22 128/70   01/22/22 134/88     Lab Results   Component Value Date    PSA 1.24 (H) 08/17/2022     Review of Systems    No fever/chills. Objective   Physical Exam     In general, he appears to be in no acute distress. Alert or awake and oriented x3. Coherent. Lungs are clear to auscultation. No wheezing/crackles. Heart RRR no m/r/g. Back: tenderness lateral to the paraspinal muscles in the lumbar region. No midline tenderness or step-off. Straight leg raise test negative bilaterally. Piriformis testing negative. Hip rotation fairly normal bilaterally. No sensory or weakness identified in the legs.     Lab Results   Component Value Date     01/22/2022    K 4.7 01/22/2022    CL 99 01/22/2022    CO2 27 01/22/2022    BUN 13 01/22/2022    CREATININE 1.0 01/22/2022    CALCIUM 9.0 01/22/2022    GLUCOSE 108 01/22/2022        Lab Results   Component Value Date    CHOL 133 03/18/2021     Lab Results   Component Value Date    TRIG 46 03/18/2021     Lab Results   Component Value Date    HDL 44 03/18/2021     Lab Results   Component Value Date    LDLCALC 80 03/18/2021     No results found for: LABVLDL, VLDL  No results found for: CHOLHDLRATIO    No results found for: Mike Cool    No results found for: TSH   No results found for: [id number]   No results found for: MG   Lab Results   Component Value Date    ALT 51 01/22/2022    AST 33 01/22/2022    ALKPHOS 81 01/22/2022 BILITOT 0.4 01/22/2022    BILIDIR <0.2 04/12/2018        Lab Results   Component Value Date    WBC 6.8 01/22/2022    HGB 14.7 01/22/2022    HCT 44.3 01/22/2022    MCV 85.9 01/22/2022     01/22/2022               An electronic signature was used to authenticate this note.     --Earlene Nelson MD

## 2022-09-08 NOTE — PATIENT INSTRUCTIONS
Heating pad at night 5 minutes or so -- for 3-5 days.        Consider epsom salt to warm bath for magnesium support

## 2022-09-21 ENCOUNTER — TELEPHONE (OUTPATIENT)
Dept: FAMILY MEDICINE CLINIC | Age: 62
End: 2022-09-21

## 2022-09-21 ENCOUNTER — HOSPITAL ENCOUNTER (OUTPATIENT)
Dept: GENERAL RADIOLOGY | Age: 62
Discharge: HOME | End: 2022-09-21
Payer: COMMERCIAL

## 2022-09-21 ENCOUNTER — HOSPITAL ENCOUNTER (OUTPATIENT)
Age: 62
Discharge: HOME | End: 2022-09-21
Payer: COMMERCIAL

## 2022-09-21 DIAGNOSIS — F17.200 TOBACCO USE DISORDER: ICD-10-CM

## 2022-09-21 DIAGNOSIS — R05.3 POST-COVID CHRONIC COUGH: ICD-10-CM

## 2022-09-21 DIAGNOSIS — U09.9 POST-COVID CHRONIC COUGH: ICD-10-CM

## 2022-09-21 PROCEDURE — 71046 X-RAY EXAM CHEST 2 VIEWS: CPT

## 2022-09-21 NOTE — TELEPHONE ENCOUNTER
Called and notified patient of xray results. He has verbalized an understanding and will call with any questions or concerns. States he will continue with current inhaler.

## 2022-09-21 NOTE — TELEPHONE ENCOUNTER
----- Message from Ashley Cueto MD sent at 9/21/2022 12:56 PM EDT -----  Please let patient know that his CXR is reassuring -- the infiltrates/atelectasis noted before are resolved. However, there is mild hyperinflation which suggest COPD/emphysema. Treatment includes smoking cessation, maintaining healthy weight and activity levels and using inhalers for good lung function.

## 2022-12-22 NOTE — PROGRESS NOTES
[address] [facility] [address]  OUTPATIENT OCCUPATIONAL THERAPY  Daily Note  [address] [facility].    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          General:  OT Visit Information  Onset Date: 19  OT Insurance Information: Julia Kariegalina - no massage  Total # of Visits Approved: 25  Total # of Visits to Date: 9  Certification Period Expiration Date: 19  Progress Note Counter: 9/10 for PN  Comments: returns to physician on        Restrictions/Precautions:       Position Activity Restriction  Other position/activity restrictions: HTN, no lifting more than 10#; left wrist surgery on 18         Subjective:  Subjective: States that he is going to go into work sometime next week to see if he is able to use his left hand to do his work tasks. Pain:  Patient Currently in Pain: Yes  Pain Assessment: 0-10  Pain Level: 2  Pain Location: Hand  Pain Orientation: Left       Objective:     Upper Extremity Function  UE AROM: active left wrist flexion/extension x 15 reps; coordination maze with left hand x 10 reps; patient relates that he is not able to supinate his left arm enough to use his left hand to perform toileting routine after BM  UE PROM: PROM to left wrist and digits to patient tolerance - relates that he does have pain with the passive motion. PROM to left forearm for supination - tightness present, but not severe tightness                                     Paraffin  Number Minutes Paraffin: x15 minutes to L hand and wrist with digits taped into flexion with coban           Activity Tolerance: Additional Comments: Patient tolerated treatment well. Assessment:  Assessment: Progressing toward goals.  Patient continue to have difficulty with supination of left forearm. Patient Education:  Patient Education: discussed anatomy of left wrist/forearm and need to speak with physician about projected progress            Plan:  Plan Comment: Continue per established POC.    Specific instructions for Next Treatment: paraffin with fingers cobaned into flexion, PROM left wrist, forearm, and hand                   Luz Akhtar, OTR/L #[id number] Debility

## 2023-01-30 ENCOUNTER — TELEPHONE (OUTPATIENT)
Dept: FAMILY MEDICINE CLINIC | Age: 63
End: 2023-01-30

## 2023-01-30 NOTE — TELEPHONE ENCOUNTER
Spoke with patient and told him to check with the pharmacy. Per pharmacist at [facility], patient has 2 refills remaining.

## 2023-03-06 ENCOUNTER — APPOINTMENT (OUTPATIENT)
Dept: GENERAL RADIOLOGY | Age: 63
End: 2023-03-06
Payer: COMMERCIAL

## 2023-03-06 ENCOUNTER — HOSPITAL ENCOUNTER (EMERGENCY)
Age: 63
Discharge: HOME | End: 2023-03-06
Payer: COMMERCIAL

## 2023-03-06 VITALS
OXYGEN SATURATION: 96 % | RESPIRATION RATE: 16 BRPM | HEART RATE: 86 BPM | DIASTOLIC BLOOD PRESSURE: 91 MMHG | SYSTOLIC BLOOD PRESSURE: 154 MMHG | TEMPERATURE: 97.4 F

## 2023-03-06 DIAGNOSIS — J40 BRONCHITIS: Primary | ICD-10-CM

## 2023-03-06 PROCEDURE — 99213 OFFICE O/P EST LOW 20 MIN: CPT

## 2023-03-06 PROCEDURE — 71046 X-RAY EXAM CHEST 2 VIEWS: CPT

## 2023-03-06 RX ORDER — AZITHROMYCIN 250 MG/1
TABLET, FILM COATED ORAL
Qty: 1 PACKET | Refills: 0 | Status: SHIPPED | OUTPATIENT
Start: 2023-03-06 | End: 2023-03-10

## 2023-03-06 RX ORDER — PREDNISONE 20 MG/1
40 TABLET ORAL DAILY
Qty: 10 TABLET | Refills: 0 | Status: SHIPPED | OUTPATIENT
Start: 2023-03-06 | End: 2023-03-11

## 2023-03-06 ASSESSMENT — ENCOUNTER SYMPTOMS
WHEEZING: 1
COUGH: 1
DIARRHEA: 0
NAUSEA: 0
RHINORRHEA: 1
SORE THROAT: 0
VOMITING: 0
ABDOMINAL PAIN: 0
SHORTNESS OF BREATH: 0

## 2023-03-06 NOTE — DISCHARGE INSTRUCTIONS
Zithromax, prednisone  As directed  Drink plenty of fluids    Continue Mucinex daily    Avoid smoking    See your family physician or return here if no improvement 3 to 5 days. Sooner if worse.

## 2023-03-06 NOTE — ED PROVIDER NOTES
[address]  Urgent Care Encounter       CHIEF COMPLAINT       Chief Complaint   Patient presents with    Cough    Shortness of Breath    Fever       Nurses Notes reviewed and I agree except as noted in the HPI. HISTORY OF PRESENT ILLNESS   Candice Small is a 57 y.o. male who presents for complaints of cough, chest congestion, shortness of breath. Symptoms x2 weeks. He used over-the-counter Mucinex with no improvement of symptoms. Patient states he does smoke and has not smoked in 5 days, but this seems to be making his symptoms worse. No known fever. He does have thick, green/brown sputum. He has had had a history of pneumonia last year. HPI    REVIEW OF SYSTEMS     Review of Systems   Constitutional:  Positive for fatigue. Negative for activity change and fever. HENT:  Positive for congestion and rhinorrhea. Negative for sore throat. Respiratory:  Positive for cough and wheezing. Negative for shortness of breath. Cardiovascular:  Negative for chest pain. Gastrointestinal:  Negative for abdominal pain, diarrhea, nausea and vomiting. PAST MEDICAL HISTORY         Diagnosis Date    Bilateral inguinal hernia 12/14/2011    Chronic back pain     Cough     patient is a  has a chronic cough    Essential hypertension 04/01/2016    GERD (gastroesophageal reflux disease)     Osteoarthritis     Recurrent left inguinal hernia     Stomach ulcer        SURGICALHISTORY     Patient  has a past surgical history that includes Rotator cuff repair (2005 x2); hernia repair; hernia repair (01/03/2012); Vasectomy (2005); Tooth Extraction (1997); Colonoscopy (2019); EGD (2019); Wrist surgery (12/27/2018); Arm Surgery (Left, 03/2020); hernia repair (Bilateral, 04/28/2020); and hernia repair (Left, 3/23/2021).     CURRENT MEDICATIONS       Discharge Medication List as of 3/6/2023 11:55 AM        CONTINUE these medications which have NOT CHANGED    Details   albuterol sulfate HFA (VENTOLIN HFA) 108 (90 Base) MCG/ACT inhaler Inhale 2 puffs into the lungs 4 times daily as needed for Wheezing, Disp-8 g, R-5Normal      tiZANidine (ZANAFLEX) 4 MG tablet Take 1 tablet by mouth every 6 hours as needed (muscle spasm), Disp-30 tablet, R-0Normal      omeprazole (PRILOSEC) 20 MG delayed release capsule Take 1 capsule by mouth daily, Disp-90 capsule, R-3Normal      meloxicam (MOBIC) 15 MG tablet Take 1 tablet by mouth daily, Disp-90 tablet, R-3Normal      lisinopril (PRINIVIL;ZESTRIL) 5 MG tablet Take 1 tablet by mouth daily, Disp-90 tablet, R-3Normal             ALLERGIES     Patient is is allergic to sulfa antibiotics. Patients   Immunization History   Administered Date(s) Administered    Influenza Virus Vaccine 03/15/2014    Pneumococcal Polysaccharide ([id number]) 03/15/2014       FAMILY HISTORY     Patient's family history includes Arthritis in his brother, father, and mother; Cancer in his brother and father; Colon Cancer (age of onset: 80) in his father; Diabetes in his father; Heart Disease in his brother, father, mother, and sister; High Blood Pressure in his brother, father, mother, and sister; Learning Disabilities in his brother and sister. SOCIAL HISTORY     Patient  reports that he has been smoking cigarettes. He has a 20.00 pack-year smoking history. He has never used smokeless tobacco. He reports current alcohol use. He reports that he does not use drugs. PHYSICAL EXAM     ED TRIAGE VITALS  BP: (!) 154/91, Temp: 97.4 °F (36.3 °C), Heart Rate: 86, Resp: 16, SpO2: 96 %,Estimated body mass index is 24.32 kg/m² as calculated from the following:    Height as of 9/8/22: 5' 6.5\" (1.689 m). Weight as of 9/8/22: 153 lb (69.4 kg). ,No LMP for male patient. Physical Exam  Constitutional:       General: He is not in acute distress. Appearance: He is normal weight. He is ill-appearing. HENT:      Head: Normocephalic and atraumatic.    Cardiovascular:      Rate and Rhythm: Normal rate and regular rhythm. Pulmonary:      Effort: Pulmonary effort is normal.      Breath sounds: Decreased breath sounds and wheezing (scattered exp wheezes throughout) present. Musculoskeletal:      Cervical back: Normal range of motion. Skin:     General: Skin is warm and dry. Capillary Refill: Capillary refill takes less than 2 seconds. Neurological:      General: No focal deficit present. Mental Status: He is alert. DIAGNOSTIC RESULTS     Labs:No results found for this visit on 23. IMAGING:    XR CHEST (2 VW)   Final Result   1. No acute cardiopulmonary finding. **This report has been created using voice recognition software. It may contain minor errors which are inherent in voice recognition technology. **      Final report electronically signed by Dr Jatin Goyal on 3/6/2023 11:51 AM            EKG:      URGENT CARE COURSE:     Vitals:    23 1040   BP: (!) 154/91   Pulse: 86   Resp: 16   Temp: 97.4 °F (36.3 °C)   SpO2: 96%       Medications - No data to display         PROCEDURES:  None    FINAL IMPRESSION      1. Bronchitis          DISPOSITION/ PLAN     Presents for what is likely bronchitis. Chest x-ray was performed showing no acute findings. Patient has scattered expiratory wheezes throughout. Frequent cough. Symptoms x2 weeks. He is a smoker. Will place on prednisone and Zithromax for treatment of symptoms. Advised to drink plenty of water. Follow-up with primary care provider return here if no improvement in 3 to 4 days. Sooner if worse.       PATIENT REFERRED TO:  MD Yoel Gruber [id number]  [facility] [address]      DISCHARGE MEDICATIONS:  Discharge Medication List as of 3/6/2023 11:55 AM        START taking these medications    Details   azithromycin (ZITHROMAX Z-BERTO) 250 MG tablet Take 2 tablets (500 mg) on Day 1, and then take 1 tablet (250 mg) on days 2 through 5., Disp-1 packet, R-0Normal      predniSONE (DELTASONE) 20 MG tablet Take 2 tablets by mouth daily for 5 days, Disp-10 tablet, R-0Normal             Discharge Medication List as of 3/6/2023 11:55 AM          Discharge Medication List as of 3/6/2023 11:55 AM          FREDIS Eng CNP    (Please note that portions of this note were completed with a voice recognition program. Efforts were made to edit the dictations but occasionally words are mis-transcribed.)           FREDIS Eng CNP  03/06/23 1207

## 2023-03-06 NOTE — ED NOTES
To [facility] with complaints of cough and SOB that started last Sunday. States he had a fever on Thursday. States he is coughing up bright yellow phlegm. State that his wife has a sinus infection.       Barb Richardson RN  03/06/23 [id number]

## 2023-03-06 NOTE — Clinical Note
[place] was seen and treated in our emergency department on 3/6/2023.  He may return to work on 03/08/2023.       If you have any questions or concerns, please don't hesitate to call.      Kadeem Leong, APRN - CNP

## 2023-03-07 ENCOUNTER — TELEPHONE (OUTPATIENT)
Dept: FAMILY MEDICINE CLINIC | Age: 63
End: 2023-03-07

## 2023-03-07 NOTE — LETTER
[address] [facility]  Phone:   [phone]   Fax: [phone]    March 7, 2023    [address]. [facility] [facility],[address] [address]    Dear Sebastian Raymond,    Thank you for choosing our [facility] on 3/6/23. Dr. Niru Modi wanted to make sure that you understand your discharge instructions and that you were able to fill any prescriptions that may have been ordered for you. Please contact the office at the above phone number if advised you to follow up with Dr. Niru Modi, or if you have any further questions or needs. Also, did you know -                             [facility]) practices can often offer you an appointment on the same day that you call for acute issues. *We have some [place] offices that offer Walk-in appointments; check our website for availability in your community, [email or web address].      *Evisits are now available for patients through [address] If you do not have MyChart and are interested, please contact the office and a staff member may assist you or go to [email or web address].     Sincerely,     Niru Modi MD and your [facility]

## 2023-03-21 ENCOUNTER — TELEPHONE (OUTPATIENT)
Dept: FAMILY MEDICINE CLINIC | Age: 63
End: 2023-03-21

## 2023-03-21 DIAGNOSIS — I10 PRIMARY HYPERTENSION: ICD-10-CM

## 2023-03-21 RX ORDER — LISINOPRIL 5 MG/1
5 TABLET ORAL DAILY
Qty: 90 TABLET | Refills: 3 | OUTPATIENT
Start: 2023-03-21 | End: 2024-03-15

## 2023-03-21 NOTE — TELEPHONE ENCOUNTER
Spoke with patient and scheduled appointment for 3/27/2023 at 1100am. States he has enough medication to last him till his appointment

## 2023-03-21 NOTE — TELEPHONE ENCOUNTER
Rosaline Kiran called requesting a refill on the following medications:  Requested Prescriptions     Pending Prescriptions Disp Refills    lisinopril (PRINIVIL;ZESTRIL) 5 MG tablet 90 tablet 3     Sig: Take 1 tablet by mouth daily       Date of last visit: 9/8/2022  Date of next visit (if applicable):Visit date not found  Date of last refill: 3/28/2022  Pharmacy Name: [facility],  [place], [place]

## 2023-03-27 ENCOUNTER — HOSPITAL ENCOUNTER (OUTPATIENT)
Age: 63
Discharge: HOME | End: 2023-03-27
Payer: COMMERCIAL

## 2023-03-27 ENCOUNTER — OFFICE VISIT (OUTPATIENT)
Dept: FAMILY MEDICINE CLINIC | Age: 63
End: 2023-03-27
Payer: COMMERCIAL

## 2023-03-27 VITALS
DIASTOLIC BLOOD PRESSURE: 82 MMHG | WEIGHT: 159 LBS | HEART RATE: 86 BPM | RESPIRATION RATE: 20 BRPM | SYSTOLIC BLOOD PRESSURE: 136 MMHG | BODY MASS INDEX: 25.28 KG/M2

## 2023-03-27 DIAGNOSIS — M67.431 GANGLION CYST OF WRIST, RIGHT: ICD-10-CM

## 2023-03-27 DIAGNOSIS — R35.1 NOCTURIA: ICD-10-CM

## 2023-03-27 DIAGNOSIS — Z00.00 WELL ADULT EXAM: ICD-10-CM

## 2023-03-27 DIAGNOSIS — K21.9 GASTROESOPHAGEAL REFLUX DISEASE WITHOUT ESOPHAGITIS: ICD-10-CM

## 2023-03-27 DIAGNOSIS — I10 PRIMARY HYPERTENSION: ICD-10-CM

## 2023-03-27 DIAGNOSIS — M15.9 PRIMARY OSTEOARTHRITIS INVOLVING MULTIPLE JOINTS: ICD-10-CM

## 2023-03-27 DIAGNOSIS — Z00.00 WELL ADULT EXAM: Primary | ICD-10-CM

## 2023-03-27 LAB
BASOPHILS ABSOLUTE: 0 THOU/MM3 (ref 0–0.1)
BASOPHILS NFR BLD AUTO: 0.5 %
DEPRECATED RDW RBC AUTO: 46.5 FL (ref 35–45)
EOSINOPHIL NFR BLD AUTO: 2 %
EOSINOPHILS ABSOLUTE: 0.1 THOU/MM3 (ref 0–0.4)
ERYTHROCYTE [DISTWIDTH] IN BLOOD BY AUTOMATED COUNT: 14.6 % (ref 11.5–14.5)
HCT VFR BLD AUTO: 45.8 % (ref 42–52)
HGB BLD-MCNC: 14.6 GM/DL (ref 14–18)
IMM GRANULOCYTES # BLD AUTO: 0.03 THOU/MM3 (ref 0–0.07)
IMM GRANULOCYTES NFR BLD AUTO: 0.5 %
LYMPHOCYTES ABSOLUTE: 1.9 THOU/MM3 (ref 1–4.8)
LYMPHOCYTES NFR BLD AUTO: 30 %
MCH RBC QN AUTO: 27.7 PG (ref 26–33)
MCHC RBC AUTO-ENTMCNC: 31.9 GM/DL (ref 32.2–35.5)
MCV RBC AUTO: 86.9 FL (ref 80–94)
MONOCYTES ABSOLUTE: 0.5 THOU/MM3 (ref 0.4–1.3)
MONOCYTES NFR BLD AUTO: 8.4 %
NEUTROPHILS NFR BLD AUTO: 58.6 %
NRBC BLD AUTO-RTO: 0 /100 WBC
PLATELET # BLD AUTO: 245 THOU/MM3 (ref 130–400)
PMV BLD AUTO: 10.4 FL (ref 9.4–12.4)
RBC # BLD AUTO: 5.27 MILL/MM3 (ref 4.7–6.1)
SEGMENTED NEUTROPHILS ABSOLUTE COUNT: 3.8 THOU/MM3 (ref 1.8–7.7)
WBC # BLD AUTO: 6.4 THOU/MM3 (ref 4.8–10.8)

## 2023-03-27 PROCEDURE — 84153 ASSAY OF PSA TOTAL: CPT

## 2023-03-27 PROCEDURE — 36415 COLL VENOUS BLD VENIPUNCTURE: CPT

## 2023-03-27 PROCEDURE — 85025 COMPLETE CBC W/AUTO DIFF WBC: CPT

## 2023-03-27 PROCEDURE — 3075F SYST BP GE 130 - 139MM HG: CPT | Performed by: NURSE PRACTITIONER

## 2023-03-27 PROCEDURE — G8484 FLU IMMUNIZE NO ADMIN: HCPCS | Performed by: NURSE PRACTITIONER

## 2023-03-27 PROCEDURE — 80061 LIPID PANEL: CPT

## 2023-03-27 PROCEDURE — 80053 COMPREHEN METABOLIC PANEL: CPT

## 2023-03-27 PROCEDURE — 99396 PREV VISIT EST AGE 40-64: CPT | Performed by: NURSE PRACTITIONER

## 2023-03-27 PROCEDURE — 3079F DIAST BP 80-89 MM HG: CPT | Performed by: NURSE PRACTITIONER

## 2023-03-27 RX ORDER — LISINOPRIL 5 MG/1
5 TABLET ORAL DAILY
Qty: 90 TABLET | Refills: 3 | Status: SHIPPED | OUTPATIENT
Start: 2023-03-27 | End: 2024-03-21

## 2023-03-27 RX ORDER — MELOXICAM 15 MG/1
15 TABLET ORAL DAILY
Qty: 90 TABLET | Refills: 3 | Status: SHIPPED | OUTPATIENT
Start: 2023-03-27 | End: 2024-03-21

## 2023-03-27 RX ORDER — OMEPRAZOLE 20 MG/1
20 CAPSULE, DELAYED RELEASE ORAL DAILY
Qty: 90 CAPSULE | Refills: 3 | Status: SHIPPED | OUTPATIENT
Start: 2023-03-27 | End: 2024-03-21

## 2023-03-27 SDOH — ECONOMIC STABILITY: FOOD INSECURITY: WITHIN THE PAST 12 MONTHS, YOU WORRIED THAT YOUR FOOD WOULD RUN OUT BEFORE YOU GOT MONEY TO BUY MORE.: NEVER TRUE

## 2023-03-27 SDOH — ECONOMIC STABILITY: FOOD INSECURITY: WITHIN THE PAST 12 MONTHS, THE FOOD YOU BOUGHT JUST DIDN'T LAST AND YOU DIDN'T HAVE MONEY TO GET MORE.: NEVER TRUE

## 2023-03-27 SDOH — ECONOMIC STABILITY: HOUSING INSECURITY
IN THE LAST 12 MONTHS, WAS THERE A TIME WHEN YOU DID NOT HAVE A STEADY PLACE TO SLEEP OR SLEPT IN A SHELTER (INCLUDING NOW)?: NO

## 2023-03-27 SDOH — ECONOMIC STABILITY: INCOME INSECURITY: HOW HARD IS IT FOR YOU TO PAY FOR THE VERY BASICS LIKE FOOD, HOUSING, MEDICAL CARE, AND HEATING?: NOT HARD AT ALL

## 2023-03-27 ASSESSMENT — PATIENT HEALTH QUESTIONNAIRE - PHQ9
1. LITTLE INTEREST OR PLEASURE IN DOING THINGS: 0
2. FEELING DOWN, DEPRESSED OR HOPELESS: 0
SUM OF ALL RESPONSES TO PHQ QUESTIONS 1-9: 0
SUM OF ALL RESPONSES TO PHQ9 QUESTIONS 1 & 2: 0
SUM OF ALL RESPONSES TO PHQ QUESTIONS 1-9: 0

## 2023-03-27 ASSESSMENT — ENCOUNTER SYMPTOMS: SHORTNESS OF BREATH: 1

## 2023-03-27 NOTE — PROGRESS NOTES
Tdap) Never done    Diabetes screen  Never done    Shingles vaccine (1 of 2) Never done    Low dose CT lung screening  Never done    Flu vaccine (1) 08/01/2022    Depression Screen  03/28/2023    Lipids  03/18/2026    Colorectal Cancer Screen  01/31/2029    Pneumococcal 0-64 years Vaccine  Completed    Hepatitis A vaccine  Aged Out    Hib vaccine  Aged Out    Meningococcal (ACWY) vaccine  Aged Out    Hepatitis C screen  Discontinued       Subjective/Objective:      Review of Systems   Constitutional:  Negative for fever. Respiratory:  Positive for shortness of breath. Genitourinary:         Nocturia   Musculoskeletal:  Positive for arthralgias. Neurological:  Negative for headaches. Psychiatric/Behavioral:  Negative for dysphoric mood. /82   Pulse 86   Resp 20   Wt 159 lb (72.1 kg)   BMI 25.28 kg/m²     Physical Exam  Vitals and nursing note reviewed. Constitutional:       General: He is awake. Appearance: Normal appearance. HENT:      Head: Normocephalic and atraumatic. Right Ear: Hearing and external ear normal.      Left Ear: Hearing and external ear normal.      Nose: Nose normal. No congestion or rhinorrhea. Eyes:      General: Lids are normal.         Right eye: No discharge. Left eye: No discharge. Conjunctiva/sclera: Conjunctivae normal.   Neck:      Trachea: No tracheal deviation. Cardiovascular:      Rate and Rhythm: Normal rate and regular rhythm. Heart sounds: Normal heart sounds. No murmur heard. Pulmonary:      Effort: Pulmonary effort is normal. No respiratory distress. Breath sounds: No stridor. No wheezing. Musculoskeletal:      Cervical back: Full passive range of motion without pain. Skin:     General: Skin is dry. Coloration: Skin is not jaundiced or pale. Neurological:      General: No focal deficit present. Mental Status: He is alert. Mental status is at baseline.    Psychiatric:         Mood and Affect: Mood and

## 2023-03-28 ENCOUNTER — TELEPHONE (OUTPATIENT)
Dept: FAMILY MEDICINE CLINIC | Age: 63
End: 2023-03-28

## 2023-03-28 LAB
ALBUMIN SERPL BCG-MCNC: 4.7 G/DL (ref 3.5–5.1)
ALP SERPL-CCNC: 68 U/L (ref 38–126)
ALT SERPL W/O P-5'-P-CCNC: 22 U/L (ref 11–66)
ANION GAP SERPL CALC-SCNC: 13 MEQ/L (ref 8–16)
AST SERPL-CCNC: 19 U/L (ref 5–40)
BILIRUB SERPL-MCNC: 0.4 MG/DL (ref 0.3–1.2)
BUN SERPL-MCNC: 27 MG/DL (ref 7–22)
CALCIUM SERPL-MCNC: 9.8 MG/DL (ref 8.5–10.5)
CHLORIDE SERPL-SCNC: 101 MEQ/L (ref 98–111)
CHOLEST SERPL-MCNC: 169 MG/DL (ref 100–199)
CO2 SERPL-SCNC: 25 MEQ/L (ref 23–33)
CREAT SERPL-MCNC: 1.1 MG/DL (ref 0.4–1.2)
GFR SERPL CREATININE-BSD FRML MDRD: > 60 ML/MIN/1.73M2
GLUCOSE SERPL-MCNC: 102 MG/DL (ref 70–108)
HDLC SERPL-MCNC: 50 MG/DL
LDLC SERPL CALC-MCNC: 107 MG/DL
POTASSIUM SERPL-SCNC: 4.9 MEQ/L (ref 3.5–5.2)
PROT SERPL-MCNC: 7.7 G/DL (ref 6.1–8)
PSA SERPL-MCNC: 1.27 NG/ML (ref 0–1)
SODIUM SERPL-SCNC: 139 MEQ/L (ref 135–145)
TRIGL SERPL-MCNC: 62 MG/DL (ref 0–199)

## 2023-03-28 NOTE — TELEPHONE ENCOUNTER
----- Message from FREDIS Kenyon CNP sent at 3/28/2023  7:58 AM EDT -----  Blood work looks okay. Psa is stable. Recheck annually.     The 10-year ASCVD risk score (Terrance ALMONTE, et al., 2019) is: 13.4%    Values used to calculate the score:      Age: 57 years      Sex: Male      Is Non- : No      Diabetic: No      Tobacco smoker: No      Systolic Blood Pressure: 443 mmHg      Is BP treated: Yes      HDL Cholesterol: 50 mg/dL      Total Cholesterol: 169 mg/dL

## 2023-06-01 ENCOUNTER — OFFICE VISIT (OUTPATIENT)
Dept: FAMILY MEDICINE CLINIC | Age: 63
End: 2023-06-01
Payer: MEDICARE

## 2023-06-01 VITALS
HEART RATE: 72 BPM | DIASTOLIC BLOOD PRESSURE: 60 MMHG | RESPIRATION RATE: 18 BRPM | WEIGHT: 152 LBS | SYSTOLIC BLOOD PRESSURE: 124 MMHG | BODY MASS INDEX: 24.17 KG/M2

## 2023-06-01 DIAGNOSIS — R10.9 RIGHT FLANK PAIN: ICD-10-CM

## 2023-06-01 DIAGNOSIS — R10.32 LEFT GROIN PAIN: Primary | ICD-10-CM

## 2023-06-01 PROCEDURE — G8427 DOCREV CUR MEDS BY ELIG CLIN: HCPCS | Performed by: NURSE PRACTITIONER

## 2023-06-01 PROCEDURE — 99214 OFFICE O/P EST MOD 30 MIN: CPT | Performed by: NURSE PRACTITIONER

## 2023-06-01 PROCEDURE — 81003 URINALYSIS AUTO W/O SCOPE: CPT | Performed by: NURSE PRACTITIONER

## 2023-06-01 PROCEDURE — 3017F COLORECTAL CA SCREEN DOC REV: CPT | Performed by: NURSE PRACTITIONER

## 2023-06-01 PROCEDURE — 1036F TOBACCO NON-USER: CPT | Performed by: NURSE PRACTITIONER

## 2023-06-01 PROCEDURE — G8420 CALC BMI NORM PARAMETERS: HCPCS | Performed by: NURSE PRACTITIONER

## 2023-06-01 PROCEDURE — 1123F ACP DISCUSS/DSCN MKR DOCD: CPT | Performed by: NURSE PRACTITIONER

## 2023-06-01 PROCEDURE — 3078F DIAST BP <80 MM HG: CPT | Performed by: NURSE PRACTITIONER

## 2023-06-01 PROCEDURE — 3074F SYST BP LT 130 MM HG: CPT | Performed by: NURSE PRACTITIONER

## 2023-06-01 ASSESSMENT — ENCOUNTER SYMPTOMS
ABDOMINAL PAIN: 1
BLOOD IN STOOL: 0
DIARRHEA: 0
NAUSEA: 0

## 2023-06-01 NOTE — PROGRESS NOTES
Scarlet London (:  1960) is a 70 y.o. male,Established patient, here for evaluation of the following chief complaint(s):  Hip Pain (Left side, previous hernia surgery )         ASSESSMENT/PLAN:  1. Left groin pain  - New  - Further evaluate with imaging  - Concern for return of left inguinal hernia but also could be related to left hip arthralgia  - OTC pain medication, heat/ice as needed for pain  -     CT ABDOMEN PELVIS WO CONTRAST Additional Contrast? None; Future    2. Right flank pain  - Chronic, uncontrolled  - Further evaluate with imaging and UA  - Concern for nephrolithiasis given location of symptoms  - OTC pain medication as needed  -     CT ABDOMEN PELVIS WO CONTRAST Additional Contrast? None; Future  -     POCT Urinalysis No Micro (Auto)      Return if symptoms worsen or fail to improve. Subjective   SUBJECTIVE/OBJECTIVE:  Patient presents for left groin pain. No known injury. Worsening. 7/10 at its worst. Mild pain while sitting. No change in bowel movements. No blood in his stool. Sharp pain. Hx of multiple left inguinal hernia repairs. Last procedure in . Patient also reports ongoing right flank pain. Ongoing for multiple months. Was initially seen by Dr. Anyi Shultz for this problem. Was treated with muscle relaxant and stretches. No improvement in symptoms. No hematuria. Hip Pain   The incident occurred more than 1 week ago. There was no injury mechanism. The quality of the pain is described as stabbing. Associated symptoms include muscle weakness (when pain is severe). Pertinent negatives include no inability to bear weight, numbness or tingling. The symptoms are aggravated by movement and weight bearing. He has tried heat, ice and NSAIDs for the symptoms. The treatment provided moderate relief. Review of Systems   Gastrointestinal:  Positive for abdominal pain (LLQ). Negative for blood in stool, diarrhea and nausea. Musculoskeletal:  Positive for arthralgias.

## 2023-06-02 ENCOUNTER — HOSPITAL ENCOUNTER (OUTPATIENT)
Age: 63
Discharge: HOME | End: 2023-06-02
Payer: MEDICARE

## 2023-06-02 ENCOUNTER — HOSPITAL ENCOUNTER (OUTPATIENT)
Dept: CT IMAGING | Age: 63
Discharge: HOME | End: 2023-06-02
Payer: MEDICARE

## 2023-06-02 DIAGNOSIS — R10.32 LEFT GROIN PAIN: ICD-10-CM

## 2023-06-02 DIAGNOSIS — R10.9 RIGHT FLANK PAIN: ICD-10-CM

## 2023-06-02 LAB
BILIRUB UR QL STRIP: NEGATIVE
CHARACTER UR: CLEAR
COLOR UR: YELLOW
GLUCOSE UR QL STRIP.AUTO: NEGATIVE MG/DL
HGB UR QL STRIP.AUTO: NEGATIVE
KETONES UR QL STRIP.AUTO: NEGATIVE
LEUKOCYTE ESTERASE UR QL STRIP.AUTO: NEGATIVE
NITRITE UR QL STRIP.AUTO: NEGATIVE
PH UR STRIP.AUTO: 7.5 [PH] (ref 5–9)
PROT UR STRIP.AUTO-MCNC: NEGATIVE MG/DL
SP GR UR REFRACT.AUTO: 1.01 (ref 1–1.03)
UROBILINOGEN UR QL STRIP.AUTO: 0.2 EU/DL (ref 0–1)

## 2023-06-02 PROCEDURE — 81003 URINALYSIS AUTO W/O SCOPE: CPT

## 2023-06-02 PROCEDURE — 74176 CT ABD & PELVIS W/O CONTRAST: CPT

## 2023-06-05 ENCOUNTER — TELEPHONE (OUTPATIENT)
Dept: FAMILY MEDICINE CLINIC | Age: 63
End: 2023-06-05

## 2023-06-05 NOTE — TELEPHONE ENCOUNTER
----- Message from FREDIS Muniz CNP sent at 6/5/2023  7:46 AM EDT -----  CT is negative for acute process. CT did reveal diffuse degenerative disc disease and degenerative spondylosis which may be contributing to symptoms. No mention of left inguinal hernia. Can we clarify with radiology? May require additional US.

## 2023-06-05 NOTE — TELEPHONE ENCOUNTER
Spoke to Jacinto in radiology. States that hernia would be found on CT w or w/o contrast. Informed TR.      LMTCB for patient

## 2023-07-07 ENCOUNTER — OFFICE VISIT (OUTPATIENT)
Dept: FAMILY MEDICINE CLINIC | Age: 63
End: 2023-07-07
Payer: MEDICARE

## 2023-07-07 VITALS
DIASTOLIC BLOOD PRESSURE: 86 MMHG | SYSTOLIC BLOOD PRESSURE: 136 MMHG | RESPIRATION RATE: 18 BRPM | WEIGHT: 152.6 LBS | HEIGHT: 66 IN | BODY MASS INDEX: 24.53 KG/M2 | HEART RATE: 76 BPM | OXYGEN SATURATION: 95 % | TEMPERATURE: 97.9 F

## 2023-07-07 DIAGNOSIS — R10.32 GROIN PAIN, LEFT: ICD-10-CM

## 2023-07-07 DIAGNOSIS — M54.16 ACUTE LEFT LUMBAR RADICULOPATHY: Primary | ICD-10-CM

## 2023-07-07 DIAGNOSIS — R29.898 LEFT LEG WEAKNESS: ICD-10-CM

## 2023-07-07 DIAGNOSIS — M25.552 LEFT HIP PAIN: ICD-10-CM

## 2023-07-07 PROCEDURE — 3078F DIAST BP <80 MM HG: CPT | Performed by: FAMILY MEDICINE

## 2023-07-07 PROCEDURE — G8420 CALC BMI NORM PARAMETERS: HCPCS | Performed by: FAMILY MEDICINE

## 2023-07-07 PROCEDURE — 3074F SYST BP LT 130 MM HG: CPT | Performed by: FAMILY MEDICINE

## 2023-07-07 PROCEDURE — G8427 DOCREV CUR MEDS BY ELIG CLIN: HCPCS | Performed by: FAMILY MEDICINE

## 2023-07-07 PROCEDURE — 1123F ACP DISCUSS/DSCN MKR DOCD: CPT | Performed by: FAMILY MEDICINE

## 2023-07-07 PROCEDURE — 99213 OFFICE O/P EST LOW 20 MIN: CPT | Performed by: FAMILY MEDICINE

## 2023-07-07 PROCEDURE — 1036F TOBACCO NON-USER: CPT | Performed by: FAMILY MEDICINE

## 2023-07-07 PROCEDURE — 3017F COLORECTAL CA SCREEN DOC REV: CPT | Performed by: FAMILY MEDICINE

## 2023-07-07 RX ORDER — PREDNISONE 20 MG/1
20 TABLET ORAL 2 TIMES DAILY
Qty: 10 TABLET | Refills: 0 | Status: SHIPPED | OUTPATIENT
Start: 2023-07-07 | End: 2023-07-12

## 2023-07-07 NOTE — PROGRESS NOTES
Boubacar Tracy (:  1960) is a 70 y.o. male,Established patient, here for evaluation of the following chief complaint(s):  Hip Pain (Left hip pain for 2 months. Just woke up with the pain one day and it's getting gradually worse. Exercises not helping. Usually takes ibuprofen to relieve pain. Gets a sharp shooting pain that travels into his knee.)         ASSESSMENT/PLAN:  1. Acute left lumbar radiculopathy  -     XR HIP LEFT (2-3 VIEWS); Future  -     XR LUMBAR SPINE (2-3 VIEWS); Future  -     predniSONE (DELTASONE) 20 MG tablet; Take 1 tablet by mouth 2 times daily for 5 days, Disp-10 tablet, R-0Normal  2. Left hip pain  -     XR HIP LEFT (2-3 VIEWS); Future  -     XR LUMBAR SPINE (2-3 VIEWS); Future  -     predniSONE (DELTASONE) 20 MG tablet; Take 1 tablet by mouth 2 times daily for 5 days, Disp-10 tablet, R-0Normal  3. Groin pain, left  -     XR HIP LEFT (2-3 VIEWS); Future  -     XR LUMBAR SPINE (2-3 VIEWS); Future  -     predniSONE (DELTASONE) 20 MG tablet; Take 1 tablet by mouth 2 times daily for 5 days, Disp-10 tablet, R-0Normal  4. Left leg weakness  -     XR HIP LEFT (2-3 VIEWS); Future  -     XR LUMBAR SPINE (2-3 VIEWS); Future  -     predniSONE (DELTASONE) 20 MG tablet; Take 1 tablet by mouth 2 times daily for 5 days, Disp-10 tablet, R-0Normal    MRI of lumbar spine is next step  He is doing HEP    Return if symptoms worsen or fail to improve. Subjective   SUBJECTIVE/OBJECTIVE:  HPI    Back pain  and groin pain now on left side progressing over last 5-6 weeks. Goes into leg, anterior thigh. Started mild and now moderate and severe at times. Never had before this bad. Right side pain about a year ago, better but still there. + xray with OA. No injury in the past or recent. Left leg weakness noted some,  worse with pain   CT abd/pelvis done per NP on  visit due to groin pain. No hernia, mass. No tingling or numbness. Meloxicam, not helping much now.    Doing HEP as

## 2023-07-08 ENCOUNTER — HOSPITAL ENCOUNTER (OUTPATIENT)
Age: 63
Discharge: HOME | End: 2023-07-08
Payer: MEDICARE

## 2023-07-08 ENCOUNTER — HOSPITAL ENCOUNTER (OUTPATIENT)
Dept: GENERAL RADIOLOGY | Age: 63
Discharge: HOME | End: 2023-07-08
Attending: FAMILY MEDICINE
Payer: MEDICARE

## 2023-07-08 DIAGNOSIS — R10.32 GROIN PAIN, LEFT: ICD-10-CM

## 2023-07-08 DIAGNOSIS — M54.16 ACUTE LEFT LUMBAR RADICULOPATHY: ICD-10-CM

## 2023-07-08 DIAGNOSIS — R29.898 LEFT LEG WEAKNESS: ICD-10-CM

## 2023-07-08 DIAGNOSIS — M25.552 LEFT HIP PAIN: ICD-10-CM

## 2023-07-08 PROCEDURE — 72100 X-RAY EXAM L-S SPINE 2/3 VWS: CPT

## 2023-07-08 PROCEDURE — 73502 X-RAY EXAM HIP UNI 2-3 VIEWS: CPT

## 2023-07-12 ENCOUNTER — TELEPHONE (OUTPATIENT)
Dept: FAMILY MEDICINE CLINIC | Age: 63
End: 2023-07-12

## 2023-07-12 DIAGNOSIS — M16.12 PRIMARY OSTEOARTHRITIS OF LEFT HIP: ICD-10-CM

## 2023-07-12 DIAGNOSIS — R29.898 LEFT LEG WEAKNESS: Primary | ICD-10-CM

## 2023-07-12 DIAGNOSIS — M25.552 LEFT HIP PAIN: ICD-10-CM

## 2023-07-12 DIAGNOSIS — M48.062 SPINAL STENOSIS OF LUMBAR REGION WITH NEUROGENIC CLAUDICATION: ICD-10-CM

## 2023-07-12 NOTE — TELEPHONE ENCOUNTER
I would recommend a visit with Dr. Ayanna Gross for the hip. In the meantime I am getting an MRI of the lumbar spine due to the leg weakness. Insurance may require him to do physical therapy for 6 weeks before approving this but sometimes with weakness of the limbs they will approve before PT. Poonam Castillo was seen today for results. Diagnoses and all orders for this visit:    Left leg weakness  -     MRI LUMBAR SPINE WO CONTRAST; Future    Left hip pain  -     [facility]OL[facility] - Farrah Simmons MD, Orthopedic Surgery, [facility]    Primary osteoarthritis of left hip  -     ROLA Simmons MD, Orthopedic Surgery, [facility]    Spinal stenosis of lumbar region with neurogenic claudication  -     MRI LUMBAR SPINE WO CONTRAST;  Future

## 2023-07-12 NOTE — TELEPHONE ENCOUNTER
----- Message from Teresa Downs MD sent at 7/11/2023  9:40 PM EDT -----  Please let patient know that his left hip shows marked narrowing of the joint. There seems to be calcification of the cartilage along with wear and tare. Flare ups of pain are treated with steroids or nsaids, therapy, and injections. If that is not helpful then surgery is considered. How is he doing?

## 2023-07-12 NOTE — TELEPHONE ENCOUNTER
Patient informed and verbalized understanding. Patient reports that he is still having the back pain and the shooting pain down to his knee. Patient reports the pain has improved with the prednisone and he has a couple left. Patient reports doing HEP but is not helping. Patient would like to move forward with the next step of what you would recommend.

## 2023-07-13 NOTE — TELEPHONE ENCOUNTER
Patient informed and verbalized understanding. Referral sent to [facility] at [phone], and phone # for Dr. Katie Burgess given to patient #[phone]. Patient agreeable to MRI of lumbar spine and Central Scheduling #[phone] given to patient to call at his convenience to schedule MRI.

## 2023-07-14 ENCOUNTER — TELEPHONE (OUTPATIENT)
Dept: FAMILY MEDICINE CLINIC | Age: 63
End: 2023-07-14

## 2023-07-14 DIAGNOSIS — M54.6 CHRONIC THORACIC SPINE PAIN: ICD-10-CM

## 2023-07-14 DIAGNOSIS — G89.29 CHRONIC THORACIC SPINE PAIN: ICD-10-CM

## 2023-07-14 DIAGNOSIS — M48.062 SPINAL STENOSIS OF LUMBAR REGION WITH NEUROGENIC CLAUDICATION: ICD-10-CM

## 2023-07-14 DIAGNOSIS — R29.898 LEFT LEG WEAKNESS: Primary | ICD-10-CM

## 2023-07-26 ENCOUNTER — TELEPHONE (OUTPATIENT)
Dept: FAMILY MEDICINE CLINIC | Age: 63
End: 2023-07-26

## 2023-07-26 ENCOUNTER — HOSPITAL ENCOUNTER (OUTPATIENT)
Dept: MRI IMAGING | Age: 63
Discharge: HOME | End: 2023-07-26
Attending: FAMILY MEDICINE
Payer: MEDICARE

## 2023-07-26 DIAGNOSIS — R29.898 LEFT LEG WEAKNESS: ICD-10-CM

## 2023-07-26 DIAGNOSIS — G89.29 CHRONIC THORACIC SPINE PAIN: ICD-10-CM

## 2023-07-26 DIAGNOSIS — M54.6 CHRONIC THORACIC SPINE PAIN: ICD-10-CM

## 2023-07-26 DIAGNOSIS — M54.16 ACUTE LEFT LUMBAR RADICULOPATHY: ICD-10-CM

## 2023-07-26 DIAGNOSIS — M48.062 SPINAL STENOSIS OF LUMBAR REGION WITH NEUROGENIC CLAUDICATION: Primary | ICD-10-CM

## 2023-07-26 DIAGNOSIS — M48.062 SPINAL STENOSIS OF LUMBAR REGION WITH NEUROGENIC CLAUDICATION: ICD-10-CM

## 2023-07-26 DIAGNOSIS — M16.12 PRIMARY OSTEOARTHRITIS OF LEFT HIP: ICD-10-CM

## 2023-07-26 PROCEDURE — 72146 MRI CHEST SPINE W/O DYE: CPT

## 2023-07-26 PROCEDURE — 72148 MRI LUMBAR SPINE W/O DYE: CPT

## 2023-07-26 NOTE — RESULT ENCOUNTER NOTE
Please let patient know that his MRI of back shows mild degenerative changes of thoracic spine but more moderate arthritis in lumbar spine, with moderate to severe narrowing around nerves at L3 to L4 level, and with moderate arthritis contributing to narrowing around nerves also noted at L4 L5 with some scoliosis. -- next steps would be to work with pain specialist for discussion of epidurals to relieve pressure and impingement of nerves and then rehabilitate for leg strength. -- if that doesn't work then further surgical options may be discussed with spine surgeon.

## 2023-07-26 NOTE — TELEPHONE ENCOUNTER
Patient informed and verbalized understanding. Patient is ok with a referral to pain management to whoever you would recommend.

## 2023-07-26 NOTE — TELEPHONE ENCOUNTER
----- Message from Christian Kehr, MD sent at 7/26/2023  4:09 PM EDT -----  Please let patient know that his MRI of back shows mild degenerative changes of thoracic spine but more moderate arthritis in lumbar spine, with moderate to severe narrowing around nerves at L3 to L4 level, and with moderate arthritis contributing to narrowing around nerves also noted at L4 L5 with some scoliosis. -- next steps would be to work with pain specialist for discussion of epidurals to relieve pressure and impingement of nerves and then rehabilitate for leg strength. -- if that doesn't work then further surgical options may be discussed with spine surgeon.

## 2023-09-11 DIAGNOSIS — F17.200 TOBACCO USE DISORDER: ICD-10-CM

## 2023-09-11 DIAGNOSIS — U09.9 POST-COVID CHRONIC COUGH: ICD-10-CM

## 2023-09-11 DIAGNOSIS — R05.3 POST-COVID CHRONIC COUGH: ICD-10-CM

## 2023-09-26 ENCOUNTER — OFFICE VISIT (OUTPATIENT)
Dept: FAMILY MEDICINE CLINIC | Age: 63
End: 2023-09-26
Payer: MEDICARE

## 2023-09-26 VITALS
HEART RATE: 71 BPM | SYSTOLIC BLOOD PRESSURE: 150 MMHG | BODY MASS INDEX: 25.2 KG/M2 | DIASTOLIC BLOOD PRESSURE: 90 MMHG | TEMPERATURE: 98.3 F | RESPIRATION RATE: 16 BRPM | WEIGHT: 156.8 LBS | OXYGEN SATURATION: 98 % | HEIGHT: 66 IN

## 2023-09-26 DIAGNOSIS — Z87.891 HISTORY OF SMOKING: ICD-10-CM

## 2023-09-26 DIAGNOSIS — I10 PRIMARY HYPERTENSION: Primary | ICD-10-CM

## 2023-09-26 DIAGNOSIS — M67.431 GANGLION CYST OF WRIST, RIGHT: ICD-10-CM

## 2023-09-26 DIAGNOSIS — Z13.1 SCREENING FOR DIABETES MELLITUS (DM): ICD-10-CM

## 2023-09-26 DIAGNOSIS — R73.09 ELEVATED GLUCOSE: ICD-10-CM

## 2023-09-26 PROCEDURE — 3078F DIAST BP <80 MM HG: CPT | Performed by: FAMILY MEDICINE

## 2023-09-26 PROCEDURE — 3074F SYST BP LT 130 MM HG: CPT | Performed by: FAMILY MEDICINE

## 2023-09-26 PROCEDURE — 99214 OFFICE O/P EST MOD 30 MIN: CPT | Performed by: FAMILY MEDICINE

## 2023-09-26 PROCEDURE — 20612 ASPIRATE/INJ GANGLION CYST: CPT | Performed by: FAMILY MEDICINE

## 2023-09-26 PROCEDURE — 1123F ACP DISCUSS/DSCN MKR DOCD: CPT | Performed by: FAMILY MEDICINE

## 2023-09-26 RX ORDER — LISINOPRIL 10 MG/1
10 TABLET ORAL DAILY
Qty: 90 TABLET | Refills: 3 | Status: SHIPPED | OUTPATIENT
Start: 2023-09-26 | End: 2024-09-20

## 2023-09-26 ASSESSMENT — ENCOUNTER SYMPTOMS: SHORTNESS OF BREATH: 0

## 2023-09-26 NOTE — PROGRESS NOTES
moderate OA    Right wrist cyst -- enlarging. Gets in the way. Mildly tender. Social History     Tobacco Use    Smoking status: Former     Packs/day: 0.50     Years: 40.00     Additional pack years: 0.00     Total pack years: 20.00     Types: Cigarettes     Quit date: 2023     Years since quittin.5    Smokeless tobacco: Never    Tobacco comments:     trying to cut down   Substance Use Topics    Alcohol use: Yes     Alcohol/week: 0.0 standard drinks of alcohol     Comment: occasionally    Drug use: No       Review of Systems   Constitutional:  Negative for fatigue and fever. Respiratory:  Negative for shortness of breath. Cardiovascular:  Negative for chest pain and leg swelling. Objective   Physical Exam  Constitutional:       General: He is not in acute distress. Appearance: Normal appearance. He is not ill-appearing. HENT:      Head: Normocephalic. Eyes:      Extraocular Movements: Extraocular movements intact. Conjunctiva/sclera: Conjunctivae normal.      Pupils: Pupils are equal, round, and reactive to light. Cardiovascular:      Rate and Rhythm: Normal rate and regular rhythm. Heart sounds: No murmur heard. Pulmonary:      Effort: Pulmonary effort is normal. No respiratory distress. Breath sounds: Normal breath sounds. No wheezing. Musculoskeletal:         General: No swelling. Comments: Right wrist ventro-lateral with firm smooth-cyst structure, <1 cm   Neurological:      Mental Status: He is alert and oriented to person, place, and time. Psychiatric:         Mood and Affect: Mood normal.       Procedure:  right Wrist Radial  Ganglion Cyst Aspiration [first aspiration]: After full discussion of the nature of this process and outlining a treatment plan with Mr. Loida Kumar, we discussed the complications, limitations, expectations, alternatives, and risks of attempted aspiration.  We specifically discussed the possibility of incomplete

## 2023-10-04 ENCOUNTER — HOSPITAL ENCOUNTER (OUTPATIENT)
Age: 63
Discharge: HOME | End: 2023-10-04
Payer: MEDICARE

## 2023-10-04 DIAGNOSIS — R73.09 ELEVATED GLUCOSE: ICD-10-CM

## 2023-10-04 DIAGNOSIS — I10 PRIMARY HYPERTENSION: ICD-10-CM

## 2023-10-04 LAB
ALBUMIN SERPL BCG-MCNC: 4.5 G/DL (ref 3.5–5.1)
ALP SERPL-CCNC: 67 U/L (ref 38–126)
ALT SERPL W/O P-5'-P-CCNC: 24 U/L (ref 11–66)
ANION GAP SERPL CALC-SCNC: 14 MEQ/L (ref 8–16)
AST SERPL-CCNC: 18 U/L (ref 5–40)
BASOPHILS ABSOLUTE: 0 THOU/MM3 (ref 0–0.1)
BASOPHILS NFR BLD AUTO: 0.7 %
BILIRUB SERPL-MCNC: 0.4 MG/DL (ref 0.3–1.2)
BUN SERPL-MCNC: 19 MG/DL (ref 7–22)
CALCIUM SERPL-MCNC: 9.5 MG/DL (ref 8.5–10.5)
CHLORIDE SERPL-SCNC: 99 MEQ/L (ref 98–111)
CHOLEST SERPL-MCNC: 143 MG/DL (ref 100–199)
CO2 SERPL-SCNC: 24 MEQ/L (ref 23–33)
CREAT SERPL-MCNC: 1.1 MG/DL (ref 0.4–1.2)
DEPRECATED RDW RBC AUTO: 47.4 FL (ref 35–45)
EOSINOPHIL NFR BLD AUTO: 1.8 %
EOSINOPHILS ABSOLUTE: 0.1 THOU/MM3 (ref 0–0.4)
ERYTHROCYTE [DISTWIDTH] IN BLOOD BY AUTOMATED COUNT: 14.4 % (ref 11.5–14.5)
GFR SERPL CREATININE-BSD FRML MDRD: > 60 ML/MIN/1.73M2
GLUCOSE SERPL-MCNC: 120 MG/DL (ref 70–108)
HCT VFR BLD AUTO: 47.1 % (ref 42–52)
HDLC SERPL-MCNC: 48 MG/DL
HGB BLD-MCNC: 15.2 GM/DL (ref 14–18)
IMM GRANULOCYTES # BLD AUTO: 0.02 THOU/MM3 (ref 0–0.07)
IMM GRANULOCYTES NFR BLD AUTO: 0.3 %
LDLC SERPL CALC-MCNC: 84 MG/DL
LYMPHOCYTES ABSOLUTE: 1.6 THOU/MM3 (ref 1–4.8)
LYMPHOCYTES NFR BLD AUTO: 26.1 %
MAGNESIUM SERPL-MCNC: 2.1 MG/DL (ref 1.6–2.4)
MCH RBC QN AUTO: 28.6 PG (ref 26–33)
MCHC RBC AUTO-ENTMCNC: 32.3 GM/DL (ref 32.2–35.5)
MCV RBC AUTO: 88.7 FL (ref 80–94)
MONOCYTES ABSOLUTE: 0.3 THOU/MM3 (ref 0.4–1.3)
MONOCYTES NFR BLD AUTO: 5.7 %
NEUTROPHILS NFR BLD AUTO: 65.4 %
NRBC BLD AUTO-RTO: 0 /100 WBC
PLATELET # BLD AUTO: 251 THOU/MM3 (ref 130–400)
PMV BLD AUTO: 10 FL (ref 9.4–12.4)
POTASSIUM SERPL-SCNC: 4.6 MEQ/L (ref 3.5–5.2)
PROT SERPL-MCNC: 7.5 G/DL (ref 6.1–8)
RBC # BLD AUTO: 5.31 MILL/MM3 (ref 4.7–6.1)
SEGMENTED NEUTROPHILS ABSOLUTE COUNT: 4 THOU/MM3 (ref 1.8–7.7)
SODIUM SERPL-SCNC: 137 MEQ/L (ref 135–145)
TRIGL SERPL-MCNC: 55 MG/DL (ref 0–199)
TSH SERPL DL<=0.005 MIU/L-ACNC: 0.92 UIU/ML (ref 0.4–4.2)
WBC # BLD AUTO: 6.1 THOU/MM3 (ref 4.8–10.8)

## 2023-10-04 PROCEDURE — 84443 ASSAY THYROID STIM HORMONE: CPT

## 2023-10-04 PROCEDURE — 85025 COMPLETE CBC W/AUTO DIFF WBC: CPT

## 2023-10-04 PROCEDURE — 83735 ASSAY OF MAGNESIUM: CPT

## 2023-10-04 PROCEDURE — 80053 COMPREHEN METABOLIC PANEL: CPT

## 2023-10-04 PROCEDURE — 36415 COLL VENOUS BLD VENIPUNCTURE: CPT

## 2023-10-04 PROCEDURE — 83036 HEMOGLOBIN GLYCOSYLATED A1C: CPT

## 2023-10-04 PROCEDURE — 80061 LIPID PANEL: CPT

## 2023-10-05 ENCOUNTER — OFFICE VISIT (OUTPATIENT)
Dept: FAMILY MEDICINE CLINIC | Age: 63
End: 2023-10-05
Payer: MEDICARE

## 2023-10-05 VITALS
OXYGEN SATURATION: 98 % | SYSTOLIC BLOOD PRESSURE: 124 MMHG | WEIGHT: 157.2 LBS | RESPIRATION RATE: 16 BRPM | DIASTOLIC BLOOD PRESSURE: 84 MMHG | TEMPERATURE: 98 F | HEART RATE: 84 BPM | BODY MASS INDEX: 25.26 KG/M2 | HEIGHT: 66 IN

## 2023-10-05 DIAGNOSIS — Z00.00 WELCOME TO MEDICARE PREVENTIVE VISIT: Primary | ICD-10-CM

## 2023-10-05 DIAGNOSIS — I10 ESSENTIAL HYPERTENSION: Chronic | ICD-10-CM

## 2023-10-05 DIAGNOSIS — F17.200 SMOKER: ICD-10-CM

## 2023-10-05 DIAGNOSIS — R73.03 PREDIABETES: ICD-10-CM

## 2023-10-05 LAB
DEPRECATED MEAN GLUCOSE BLD GHB EST-ACNC: 123 MG/DL (ref 70–126)
HBA1C MFR BLD HPLC: 6.1 % (ref 4.4–6.4)

## 2023-10-05 PROCEDURE — 1123F ACP DISCUSS/DSCN MKR DOCD: CPT | Performed by: FAMILY MEDICINE

## 2023-10-05 PROCEDURE — G0446 INTENS BEHAVE THER CARDIO DX: HCPCS | Performed by: FAMILY MEDICINE

## 2023-10-05 PROCEDURE — G0402 INITIAL PREVENTIVE EXAM: HCPCS | Performed by: FAMILY MEDICINE

## 2023-10-05 PROCEDURE — 3079F DIAST BP 80-89 MM HG: CPT | Performed by: FAMILY MEDICINE

## 2023-10-05 PROCEDURE — 3074F SYST BP LT 130 MM HG: CPT | Performed by: FAMILY MEDICINE

## 2023-10-05 ASSESSMENT — PATIENT HEALTH QUESTIONNAIRE - PHQ9
SUM OF ALL RESPONSES TO PHQ QUESTIONS 1-9: 0
SUM OF ALL RESPONSES TO PHQ9 QUESTIONS 1 & 2: 0
SUM OF ALL RESPONSES TO PHQ QUESTIONS 1-9: 0
SUM OF ALL RESPONSES TO PHQ QUESTIONS 1-9: 0
2. FEELING DOWN, DEPRESSED OR HOPELESS: 0
SUM OF ALL RESPONSES TO PHQ QUESTIONS 1-9: 0
1. LITTLE INTEREST OR PLEASURE IN DOING THINGS: 0

## 2023-10-05 ASSESSMENT — LIFESTYLE VARIABLES
HOW MANY STANDARD DRINKS CONTAINING ALCOHOL DO YOU HAVE ON A TYPICAL DAY: 1 OR 2
HOW OFTEN DO YOU HAVE A DRINK CONTAINING ALCOHOL: 2-3 TIMES A WEEK

## 2023-10-05 NOTE — PATIENT INSTRUCTIONS
More water  Reduce pop intake and sweetened drinks (once a week for a treat). Uses pure stevia for sweetening if needed. See alternatives below. Less processed grains/foods   Add more vegetable 2-3 cups  Add one piece of fruit  Add tree nuts -- variety, most days of the week     Recommend substitute for sweetened beverages including pop, sweet tea, juices:   -- Continue good water intake   -- adding stevia to tea or coffee  -- Zevia brand pop (stevia will not increase your insulin)   -- Stevia flavoring to soda water (eg. Root beer flavoring)  -- Add 2-3 mint oil drops or 2-3 lemon oil drops to 10-12 oz of water. To remove extra sugar and reduce amount of processed foods, consider the following:    -- Keep added sugars limit to <25 gram per day    -- For items with grains, aim for Carb: fiber ratio 10: 1+. For example, 47 grams of carb: 4.7 is minimum and 6 grams fiber is great. -- More fiber and moderate healthy fats to replace less desirable carbs or processed food options. -- increase fiber slowly for your gut to adjust.  Vegetables, legumes, nuts, seeds, fruits, whole grains. Fiber helps with weight maintenance, gut health, reduce inflammation, lower cholesterol, etc.          Copied from [employer]  On 5/4/8730          Preventing Falls: Care Instructions    Talk to your doctor about the medicines you take. Ask if any of them increase the risk of falls and whether they can be changed or stopped. Try to exercise regularly. It can help improve your strength and balance. This can help lower your risk of falling. Practice fall safety and prevention. Wear low-heeled shoes that fit well and give your feet good support. Talk to your doctor if you have foot problems that make this hard. Carry a cellphone or wear a medical alert device that you can use to call for help. Use stepladders instead of chairs to reach high objects.  Don't

## 2023-10-30 NOTE — TELEPHONE ENCOUNTER
Avel Armijo called requesting a refill on the following medications:  Requested Prescriptions     Pending Prescriptions Disp Refills    PROAIR  (90 Base) MCG/ACT inhaler [Pharmacy Med Name: PROAIR HFA 90 MCG INHALER] 8.5 g      Sig: inhale 2 puffs by mouth and INTO THE LUNGS four times a day if needed for wheezing       Date of last visit: 7/7/2023  Date of next visit (if applicable):9/26/2023  Date of last refill: 9/8/22  Pharmacy Name: [facility]    Rx pending #8g /5      [place],  Marie Han LPN No protocol for requested medication     HYDROcodone-acetaminophen (NORCO) 7.5-325 MG per tablet  Last office visit date: 11/4/2022    PDMP reviewed and documented  Last filled:   Prescribed: 9/28/2023  Dispensed: 9/28/2023  Sold: 9/29/2023  Qty: 30  Days: 30  Refills: 0    Last Urine test: 8/1/2023  Contract last signed: 1/12/2023    Sent to provider to approve or deny       Patient called today with regards to the following prescription request: Current    Provider: Kolton Watkins MD  Medication: Hydrocodone    Pharmacy:       [id number] [place] [address] [address] [address] [facility] [address]  Phone: [phone] Fax: [phone]              For additional information, or if you need to contact the caller at the following number:    Contact Phone Number:   Mobile [phone]          show

## 2023-12-23 ENCOUNTER — HOSPITAL ENCOUNTER (EMERGENCY)
Age: 63
Discharge: HOME | End: 2023-12-23
Attending: EMERGENCY MEDICINE
Payer: MEDICARE

## 2023-12-23 ENCOUNTER — APPOINTMENT (OUTPATIENT)
Dept: GENERAL RADIOLOGY | Age: 63
End: 2023-12-23
Payer: MEDICARE

## 2023-12-23 VITALS
OXYGEN SATURATION: 98 % | SYSTOLIC BLOOD PRESSURE: 165 MMHG | TEMPERATURE: 97.9 F | RESPIRATION RATE: 17 BRPM | HEART RATE: 76 BPM | DIASTOLIC BLOOD PRESSURE: 82 MMHG

## 2023-12-23 DIAGNOSIS — K21.9 GASTROESOPHAGEAL REFLUX DISEASE WITHOUT ESOPHAGITIS: ICD-10-CM

## 2023-12-23 DIAGNOSIS — S62.639A CLOSED FRACTURE OF TUFT OF DISTAL PHALANX OF FINGER: ICD-10-CM

## 2023-12-23 DIAGNOSIS — S61.209A AVULSION OF FINGER TIP, INITIAL ENCOUNTER: Primary | ICD-10-CM

## 2023-12-23 PROCEDURE — 6360000002 HC RX W HCPCS: Performed by: NURSE PRACTITIONER

## 2023-12-23 PROCEDURE — 96372 THER/PROPH/DIAG INJ SC/IM: CPT

## 2023-12-23 PROCEDURE — 90471 IMMUNIZATION ADMIN: CPT | Performed by: NURSE PRACTITIONER

## 2023-12-23 PROCEDURE — 99213 OFFICE O/P EST LOW 20 MIN: CPT | Performed by: NURSE PRACTITIONER

## 2023-12-23 PROCEDURE — 90715 TDAP VACCINE 7 YRS/> IM: CPT | Performed by: NURSE PRACTITIONER

## 2023-12-23 PROCEDURE — 6370000000 HC RX 637 (ALT 250 FOR IP)

## 2023-12-23 PROCEDURE — 99283 EMERGENCY DEPT VISIT LOW MDM: CPT

## 2023-12-23 PROCEDURE — 73140 X-RAY EXAM OF FINGER(S): CPT

## 2023-12-23 PROCEDURE — 99215 OFFICE O/P EST HI 40 MIN: CPT

## 2023-12-23 RX ORDER — HYDROCODONE BITARTRATE AND ACETAMINOPHEN 5; 325 MG/1; MG/1
1 TABLET ORAL ONCE
Status: COMPLETED | OUTPATIENT
Start: 2023-12-23 | End: 2023-12-23

## 2023-12-23 RX ORDER — CEPHALEXIN 500 MG/1
500 CAPSULE ORAL 2 TIMES DAILY
Qty: 14 CAPSULE | Refills: 0 | Status: SHIPPED | OUTPATIENT
Start: 2023-12-23 | End: 2023-12-30

## 2023-12-23 RX ORDER — GINSENG 100 MG
CAPSULE ORAL ONCE
Status: COMPLETED | OUTPATIENT
Start: 2023-12-23 | End: 2023-12-23

## 2023-12-23 RX ORDER — HYDROCODONE BITARTRATE AND ACETAMINOPHEN 5; 325 MG/1; MG/1
1 TABLET ORAL EVERY 6 HOURS PRN
Qty: 12 TABLET | Refills: 0 | Status: SHIPPED | OUTPATIENT
Start: 2023-12-23 | End: 2023-12-26

## 2023-12-23 RX ADMIN — BACITRACIN: 500 OINTMENT TOPICAL at 15:11

## 2023-12-23 RX ADMIN — HYDROCODONE BITARTRATE AND ACETAMINOPHEN 1 TABLET: 5; 325 TABLET ORAL at 15:08

## 2023-12-23 RX ADMIN — TETANUS TOXOID, REDUCED DIPHTHERIA TOXOID AND ACELLULAR PERTUSSIS VACCINE, ADSORBED 0.5 ML: 5; 2.5; 8; 8; 2.5 SUSPENSION INTRAMUSCULAR at 12:47

## 2023-12-23 NOTE — ED NOTES
Wound wash used on a wet gauze over finger and applied more wet gauze and wrapped in kerlix at this time.       Jj Hodge RN  12/23/23 [id number]

## 2023-12-26 RX ORDER — OMEPRAZOLE 20 MG/1
20 CAPSULE, DELAYED RELEASE ORAL DAILY
Qty: 90 CAPSULE | Refills: 3 | OUTPATIENT
Start: 2023-12-26

## 2024-01-02 DIAGNOSIS — K21.9 GASTROESOPHAGEAL REFLUX DISEASE WITHOUT ESOPHAGITIS: ICD-10-CM

## 2024-01-03 RX ORDER — OMEPRAZOLE 20 MG/1
20 CAPSULE, DELAYED RELEASE ORAL DAILY
Qty: 90 CAPSULE | Refills: 3 | OUTPATIENT
Start: 2024-01-03

## 2024-01-04 ENCOUNTER — HOSPITAL ENCOUNTER (OUTPATIENT)
Age: 64
Discharge: HOME | End: 2024-01-04
Payer: MEDICARE

## 2024-01-04 LAB
BILIRUB UR QL STRIP.AUTO: NEGATIVE
CHARACTER UR: CLEAR
COLOR: YELLOW
GLUCOSE UR QL STRIP.AUTO: NEGATIVE MG/DL
HGB UR QL STRIP.AUTO: NEGATIVE
KETONES UR QL STRIP.AUTO: NEGATIVE
NITRITE UR QL STRIP: NEGATIVE
PH UR STRIP.AUTO: 6.5 [PH] (ref 5–9)
PROT UR STRIP.AUTO-MCNC: NEGATIVE MG/DL
SP GR UR REFRACT.AUTO: 1.01 (ref 1–1.03)
UROBILINOGEN, URINE: 0.2 EU/DL (ref 0–1)
WBC #/AREA URNS HPF: NEGATIVE /[HPF]

## 2024-01-04 PROCEDURE — 81003 URINALYSIS AUTO W/O SCOPE: CPT

## 2024-01-09 ENCOUNTER — TELEPHONE (OUTPATIENT)
Dept: FAMILY MEDICINE CLINIC | Age: 64
End: 2024-01-09

## 2024-01-09 DIAGNOSIS — K21.9 GASTROESOPHAGEAL REFLUX DISEASE WITHOUT ESOPHAGITIS: ICD-10-CM

## 2024-01-09 RX ORDER — OMEPRAZOLE 20 MG/1
20 CAPSULE, DELAYED RELEASE ORAL DAILY
Qty: 90 CAPSULE | Refills: 0 | Status: SHIPPED | OUTPATIENT
Start: 2024-01-09 | End: 2025-01-03

## 2024-01-09 NOTE — TELEPHONE ENCOUNTER
Refilled 90 day supply of omeprazole  Future Appointments   Date Time Provider Department Center   1/12/2024 11:20 AM Marsha Ireland MD [facility]

## 2024-01-09 NOTE — TELEPHONE ENCOUNTER
Patient needs a refill on Omeprazole, called into [facility].  The pharmacy told him he does not have any refills left.  He has been out completely for four days.

## 2024-01-09 NOTE — TELEPHONE ENCOUNTER
Spoke with [facility] and patient filled his last script in September and then on October 7 patient refilled and paid out of pocket. Pharmacy not sure if he lost them or what but patient reports he is out and he should have enough till 3/27/24.

## 2024-01-12 ENCOUNTER — OFFICE VISIT (OUTPATIENT)
Dept: FAMILY MEDICINE CLINIC | Age: 64
End: 2024-01-12
Payer: MEDICARE

## 2024-01-12 VITALS
BODY MASS INDEX: 25.52 KG/M2 | SYSTOLIC BLOOD PRESSURE: 180 MMHG | HEART RATE: 78 BPM | WEIGHT: 158.8 LBS | OXYGEN SATURATION: 96 % | TEMPERATURE: 97.9 F | HEIGHT: 66 IN | RESPIRATION RATE: 18 BRPM | DIASTOLIC BLOOD PRESSURE: 100 MMHG

## 2024-01-12 DIAGNOSIS — Z01.818 PREOPERATIVE EXAMINATION: Primary | ICD-10-CM

## 2024-01-12 DIAGNOSIS — I44.0 AV BLOC FIRST DEGREE: ICD-10-CM

## 2024-01-12 DIAGNOSIS — R73.03 PREDIABETES: ICD-10-CM

## 2024-01-12 DIAGNOSIS — M16.12 PRIMARY OSTEOARTHRITIS OF LEFT HIP: ICD-10-CM

## 2024-01-12 DIAGNOSIS — Z87.891 HISTORY OF SMOKING: ICD-10-CM

## 2024-01-12 DIAGNOSIS — I10 PRIMARY HYPERTENSION: ICD-10-CM

## 2024-01-12 PROCEDURE — 1123F ACP DISCUSS/DSCN MKR DOCD: CPT | Performed by: FAMILY MEDICINE

## 2024-01-12 PROCEDURE — 3077F SYST BP >= 140 MM HG: CPT | Performed by: FAMILY MEDICINE

## 2024-01-12 PROCEDURE — 99214 OFFICE O/P EST MOD 30 MIN: CPT | Performed by: FAMILY MEDICINE

## 2024-01-12 PROCEDURE — 3080F DIAST BP >= 90 MM HG: CPT | Performed by: FAMILY MEDICINE

## 2024-01-12 RX ORDER — LISINOPRIL 20 MG/1
20 TABLET ORAL DAILY
Qty: 90 TABLET | Refills: 3 | Status: SHIPPED | OUTPATIENT
Start: 2024-01-12 | End: 2025-01-06

## 2024-01-12 ASSESSMENT — PATIENT HEALTH QUESTIONNAIRE - PHQ9
SUM OF ALL RESPONSES TO PHQ QUESTIONS 1-9: 0
SUM OF ALL RESPONSES TO PHQ QUESTIONS 1-9: 0
SUM OF ALL RESPONSES TO PHQ9 QUESTIONS 1 & 2: 0
2. FEELING DOWN, DEPRESSED OR HOPELESS: 0
1. LITTLE INTEREST OR PLEASURE IN DOING THINGS: 0
SUM OF ALL RESPONSES TO PHQ QUESTIONS 1-9: 0
SUM OF ALL RESPONSES TO PHQ QUESTIONS 1-9: 0

## 2024-01-12 ASSESSMENT — ENCOUNTER SYMPTOMS
TROUBLE SWALLOWING: 0
SHORTNESS OF BREATH: 0
CONSTIPATION: 0
DIARRHEA: 0
SORE THROAT: 0
WHEEZING: 0
COUGH: 0
ABDOMINAL PAIN: 0
VOMITING: 0

## 2024-01-12 NOTE — PROGRESS NOTES
[facility] MEDICINE  [address]  [facility] [address]  Dept: [phone]  Loc: [phone]     SUBJECTIVE         Pt presents for PRE-OP PE for planned left hip replacement .  Surgery is scheduled with Dr. Burns on 1/23.  General or spinal anesthesia is planned.  Current symptoms include severe pain, limping .   Previous therapy tried includes: medications, injection, therapy.     CURRENT EXERCISE REGIMEN:  Mets > 4 (light housework, climbing a flight of stairs) :  yes    BP uncontrolled here.  Taking his lisinopril 10 mg. Having pain but more trouble related to stress.  Cares for 5 grandchildren with a couple of them that are rather young.  Unfortunately parents are using illegal drugs and unable to care for children.    Patient reported that his laboratories and EKG were done at a while.  We were able to obtain an EKG showing sinus bradycardia with a first-degree AV block and incomplete right bundle branch.  Urine analysis that was normal.  Chest x-ray that was negative except for hyperinflation with chronic interstitial changes.    Lab Results   Component Value Date/Time     10/04/2023 01:38 PM    K 4.6 10/04/2023 01:38 PM    CL 99 10/04/2023 01:38 PM    CO2 24 10/04/2023 01:38 PM    BUN 19 10/04/2023 01:38 PM    CREATININE 1.1 10/04/2023 01:38 PM    GLUCOSE 120 10/04/2023 01:38 PM    CALCIUM 9.5 10/04/2023 01:38 PM    LABGLOM >60 10/04/2023 01:38 PM      Lab Results   Component Value Date     10/04/2023    K 4.6 10/04/2023    CL 99 10/04/2023    CO2 24 10/04/2023    BUN 19 10/04/2023    CREATININE 1.1 10/04/2023    GLUCOSE 120 (H) 10/04/2023    CALCIUM 9.5 10/04/2023    PROT 7.5 10/04/2023    LABALBU 4.5 10/04/2023    BILITOT 0.4 10/04/2023    ALKPHOS 67 10/04/2023    AST 18 10/04/2023    ALT 24 10/04/2023    LABGLOM >60 10/04/2023       Lab Results   Component Value Date    TSH 0.916 10/04/2023     Lab Results   Component Value

## 2024-01-16 ENCOUNTER — NURSE ONLY (OUTPATIENT)
Dept: FAMILY MEDICINE CLINIC | Age: 64
End: 2024-01-16

## 2024-01-16 VITALS — SYSTOLIC BLOOD PRESSURE: 172 MMHG | HEART RATE: 67 BPM | DIASTOLIC BLOOD PRESSURE: 88 MMHG

## 2024-01-16 NOTE — PROGRESS NOTES
Patient presents to the office today for a blood pressure recheck. /84, P 71. Per patient request checked home BP monitor. The home BP monitor was off by quite a bit, and patient reports he will get a new BP monitor for home. Patient sat in the patient room and BP was rechecked after giving patient time to rest. /88, P 67.

## 2024-01-17 ENCOUNTER — TELEPHONE (OUTPATIENT)
Dept: FAMILY MEDICINE CLINIC | Age: 64
End: 2024-01-17

## 2024-01-17 RX ORDER — ATENOLOL 25 MG/1
25 TABLET ORAL DAILY
Qty: 30 TABLET | Refills: 0 | Status: SHIPPED | OUTPATIENT
Start: 2024-01-17

## 2024-01-17 NOTE — TELEPHONE ENCOUNTER
Slight improvement but not to goal  Patient to continue increased lisinopril @ 20 mg.    Due to needing to get BP down in preparation for surgery, I recommend starting atenolol 25 mg orally nightly. Sent to [facility] [place].    Recheck BP on Friday morning.

## 2024-01-19 ENCOUNTER — NURSE ONLY (OUTPATIENT)
Dept: FAMILY MEDICINE CLINIC | Age: 64
End: 2024-01-19

## 2024-01-19 ENCOUNTER — HOSPITAL ENCOUNTER (OUTPATIENT)
Age: 64
Discharge: HOME | End: 2024-01-19
Payer: MEDICARE

## 2024-01-19 VITALS — SYSTOLIC BLOOD PRESSURE: 176 MMHG | OXYGEN SATURATION: 96 % | HEART RATE: 62 BPM | DIASTOLIC BLOOD PRESSURE: 90 MMHG

## 2024-01-19 DIAGNOSIS — I10 PRIMARY HYPERTENSION: ICD-10-CM

## 2024-01-19 LAB
BASOPHILS ABSOLUTE: 0 THOU/MM3 (ref 0–0.1)
BASOPHILS NFR BLD AUTO: 0.6 %
CRP SERPL-MCNC: < 0.3 MG/DL (ref 0–1)
DEPRECATED RDW RBC AUTO: 45.4 FL (ref 35–45)
EOSINOPHIL NFR BLD AUTO: 3.8 %
EOSINOPHILS ABSOLUTE: 0.2 THOU/MM3 (ref 0–0.4)
ERYTHROCYTE [DISTWIDTH] IN BLOOD BY AUTOMATED COUNT: 14 % (ref 11.5–14.5)
ERYTHROCYTE [SEDIMENTATION RATE] IN BLOOD BY WESTERGREN METHOD: 5 MM/HR (ref 0–10)
HCT VFR BLD AUTO: 51.1 % (ref 42–52)
HGB BLD-MCNC: 16.7 GM/DL (ref 14–18)
IMM GRANULOCYTES # BLD AUTO: 0.04 THOU/MM3 (ref 0–0.07)
IMM GRANULOCYTES NFR BLD AUTO: 0.6 %
LYMPHOCYTES ABSOLUTE: 2.2 THOU/MM3 (ref 1–4.8)
LYMPHOCYTES NFR BLD AUTO: 33.6 %
MCH RBC QN AUTO: 29.2 PG (ref 26–33)
MCHC RBC AUTO-ENTMCNC: 32.7 GM/DL (ref 32.2–35.5)
MCV RBC AUTO: 89.5 FL (ref 80–94)
MONOCYTES ABSOLUTE: 0.6 THOU/MM3 (ref 0.4–1.3)
MONOCYTES NFR BLD AUTO: 8.9 %
NEUTROPHILS NFR BLD AUTO: 52.5 %
NRBC BLD AUTO-RTO: 0 /100 WBC
PLATELET # BLD AUTO: 254 THOU/MM3 (ref 130–400)
PMV BLD AUTO: 11.1 FL (ref 9.4–12.4)
RBC # BLD AUTO: 5.71 MILL/MM3 (ref 4.7–6.1)
SEGMENTED NEUTROPHILS ABSOLUTE COUNT: 3.4 THOU/MM3 (ref 1.8–7.7)
WBC # BLD AUTO: 6.5 THOU/MM3 (ref 4.8–10.8)

## 2024-01-19 PROCEDURE — 36415 COLL VENOUS BLD VENIPUNCTURE: CPT

## 2024-01-19 PROCEDURE — 85025 COMPLETE CBC W/AUTO DIFF WBC: CPT

## 2024-01-19 PROCEDURE — 86140 C-REACTIVE PROTEIN: CPT

## 2024-01-19 PROCEDURE — 85651 RBC SED RATE NONAUTOMATED: CPT

## 2024-01-19 RX ORDER — LISINOPRIL 30 MG/1
30 TABLET ORAL DAILY
Qty: 90 TABLET | Refills: 3
Start: 2024-01-19 | End: 2025-01-13

## 2024-01-19 NOTE — PROGRESS NOTES
Patient presented to the office for a blood pressure recheck.  Patient's 1st reading : 176/86 in [facility]                                       HR: 63                                       O2: 97      2nd reading after waiting 10 minutes:  172/88: [facility]  HR: 64  O2: 96    Patient was instructed to lay down in the procedure room for 15-20 minutes and then blood pressure was rechecked.    3rd Readin/90 in [facility]  HR: 62  O2: 96     was made aware and spoke with patient herself.

## 2024-01-19 NOTE — PROGRESS NOTES
Increase lisinopril to 30 mg. Continue atenolol.  Return on Tuesday.    Alert [facility] that patient's BP are not controlled and are still 170s/80s.  Not cleared yet.

## 2024-01-23 ENCOUNTER — TELEPHONE (OUTPATIENT)
Dept: FAMILY MEDICINE CLINIC | Age: 64
End: 2024-01-23

## 2024-01-23 ENCOUNTER — NURSE ONLY (OUTPATIENT)
Dept: FAMILY MEDICINE CLINIC | Age: 64
End: 2024-01-23

## 2024-01-23 VITALS — SYSTOLIC BLOOD PRESSURE: 136 MMHG | HEART RATE: 65 BPM | DIASTOLIC BLOOD PRESSURE: 86 MMHG

## 2024-01-23 DIAGNOSIS — I10 PRIMARY HYPERTENSION: Primary | ICD-10-CM

## 2024-01-23 NOTE — PROGRESS NOTES
Patient here for B/P check. States B/P at home over the weekend ran in the 120s/80s. B/P today in office was WNL.

## 2024-01-23 NOTE — TELEPHONE ENCOUNTER
HTN now appears to be controlled.  Okay for surgery   Patient to continue atenolol 25 mg + lisinopril 30 mg.  Smoking cessation recommendations ongoing.   He should be off meloxicam already.    Let [facility] Dr. Renaldo Leigh know.    Send this noted along + previous pre-op note and paper [facility] medical clearance.     Thank you.     BP Readings from Last 10 Encounters:   01/23/24 136/86   01/19/24 (!) 176/90   01/16/24 (!) 172/88   01/12/24 (!) 180/100   12/23/23 (!) 165/82   10/05/23 124/84   09/26/23 (!) 150/90   07/07/23 136/86   06/01/23 124/60   03/27/23 136/82

## 2024-02-05 ENCOUNTER — TELEPHONE (OUTPATIENT)
Dept: FAMILY MEDICINE CLINIC | Age: 64
End: 2024-02-05

## 2024-02-05 ENCOUNTER — OFFICE VISIT (OUTPATIENT)
Dept: FAMILY MEDICINE CLINIC | Age: 64
End: 2024-02-05
Payer: MEDICARE

## 2024-02-05 VITALS
WEIGHT: 156 LBS | HEART RATE: 80 BPM | RESPIRATION RATE: 20 BRPM | DIASTOLIC BLOOD PRESSURE: 70 MMHG | SYSTOLIC BLOOD PRESSURE: 134 MMHG | BODY MASS INDEX: 25.18 KG/M2

## 2024-02-05 DIAGNOSIS — R43.2 ALTERED TASTE: Primary | ICD-10-CM

## 2024-02-05 DIAGNOSIS — I10 PRIMARY HYPERTENSION: ICD-10-CM

## 2024-02-05 PROBLEM — M48.05 SPINAL STENOSIS, THORACOLUMBAR REGION: Status: ACTIVE | Noted: 2024-02-05

## 2024-02-05 PROBLEM — M54.16 LUMBAR RADICULOPATHY: Status: ACTIVE | Noted: 2024-02-05

## 2024-02-05 PROCEDURE — 99213 OFFICE O/P EST LOW 20 MIN: CPT | Performed by: NURSE PRACTITIONER

## 2024-02-05 PROCEDURE — 1123F ACP DISCUSS/DSCN MKR DOCD: CPT | Performed by: NURSE PRACTITIONER

## 2024-02-05 PROCEDURE — 3078F DIAST BP <80 MM HG: CPT | Performed by: NURSE PRACTITIONER

## 2024-02-05 PROCEDURE — 3075F SYST BP GE 130 - 139MM HG: CPT | Performed by: NURSE PRACTITIONER

## 2024-02-05 RX ORDER — PREGABALIN 75 MG/1
75 CAPSULE ORAL 2 TIMES DAILY
COMMUNITY
Start: 2024-01-25

## 2024-02-05 RX ORDER — ATENOLOL 25 MG/1
25 TABLET ORAL DAILY
Qty: 30 TABLET | Refills: 0 | Status: SHIPPED | OUTPATIENT
Start: 2024-02-05 | End: 2024-02-05

## 2024-02-05 RX ORDER — OXYCODONE HYDROCHLORIDE 5 MG/1
TABLET ORAL
COMMUNITY
Start: 2024-01-25

## 2024-02-05 RX ORDER — ASPIRIN 325 MG
TABLET ORAL DAILY
COMMUNITY
Start: 2024-01-25

## 2024-02-05 ASSESSMENT — ENCOUNTER SYMPTOMS
SORE THROAT: 0
FACIAL SWELLING: 0
TROUBLE SWALLOWING: 1

## 2024-02-05 NOTE — PROGRESS NOTES
[place] (:  1960) is a 63 y.o. male,Established patient, here for evaluation of the following chief complaint(s):  Oral Pain (Tongue is sensitive )         ASSESSMENT/PLAN:  1. Altered taste  - Acute  - I suspect oropharyngeal symptoms are due to a medication SE. I encouraged patient to stop Lyrica, as it is a ST medication at this time, to see if this improves symptoms  - Notify office if no resolution in symptoms or if symptoms worsen    2. Primary hypertension  - Chronic, controlled  - Stop atenolol  - Monitor bp at home  - Follow up in 1 week with a nurse visit to verify stability of bp's and check bp cuff accuracy     Return if symptoms worsen or fail to improve.         Subjective   SUBJECTIVE/OBJECTIVE:  Patient presents for evaluation of altered taste and tongue sensitivity. States symptoms started 3 days ago. Slightly better today. Tongue feels like it was burned. Feels swollen. Food tastes salty. Bumps on the back of his tongue. No sob but some difficulty swallowing this morning. Was started on Lyrica 1 week ago.         Review of Systems   Constitutional:  Negative for fever.   HENT:  Positive for trouble swallowing. Negative for facial swelling, mouth sores and sore throat.           Objective   Physical Exam  Vitals and nursing note reviewed.   Constitutional:       General: He is not in acute distress.     Appearance: Normal appearance.   HENT:      Head: Normocephalic and atraumatic.      Right Ear: External ear normal.      Left Ear: External ear normal.      Nose: No nasal deformity or rhinorrhea.      Mouth/Throat:      Lips: Pink. No lesions.      Mouth: No oral lesions or angioedema.      Dentition: No gum lesions.      Pharynx: No pharyngeal swelling, oropharyngeal exudate or posterior oropharyngeal erythema.     Eyes:      General: Lids are normal.         Right eye: No discharge.         Left eye: No discharge.      Conjunctiva/sclera: Conjunctivae normal.   Pulmonary:

## 2024-02-05 NOTE — TELEPHONE ENCOUNTER
[place] called requesting a refill on the following medications:  Requested Prescriptions     Pending Prescriptions Disp Refills    atenolol (TENORMIN) 25 MG tablet 30 tablet 0     Sig: Take 1 tablet by mouth daily       Date of last visit: 1/12/2024  Date of next visit (if applicable):5/7/2024  Date of last refill: 01/17/24  Pharmacy Name: [facility] [place]      Thanks,  Sveta Ferrer MA

## 2024-02-05 NOTE — TELEPHONE ENCOUNTER
Refilling short-term. He is seeing Sumanth today. Sharing with Sumanth to evaluate need for ongoing atenolol    Future Appointments   Date Time Provider Department Center   2/5/2024  1:20 PM Sumanth Lovell APRN - CNP [facility]   5/7/2024 10:40 AM Shu, Marsha S, MD [facility] [place]

## 2024-02-05 NOTE — TELEPHONE ENCOUNTER
[facility]- Nurse triage, patient called and reports he has bumps on the back of his tongue, tip of his tongue is tender, and swollen x 3 days. Appt scheduled for 2/5/24 at 1320.

## 2024-02-29 ENCOUNTER — HOSPITAL ENCOUNTER (OUTPATIENT)
Age: 64
Discharge: HOME | End: 2024-02-29
Payer: MEDICARE

## 2024-02-29 ENCOUNTER — HOSPITAL ENCOUNTER (OUTPATIENT)
Dept: GENERAL RADIOLOGY | Age: 64
Discharge: HOME | End: 2024-02-29
Payer: MEDICARE

## 2024-02-29 DIAGNOSIS — G89.18 POST-OP PAIN: ICD-10-CM

## 2024-02-29 PROCEDURE — 73502 X-RAY EXAM HIP UNI 2-3 VIEWS: CPT

## 2024-03-11 DIAGNOSIS — I10 PRIMARY HYPERTENSION: ICD-10-CM

## 2024-03-11 RX ORDER — LISINOPRIL 30 MG/1
30 TABLET ORAL DAILY
Qty: 90 TABLET | Refills: 0 | Status: SHIPPED | OUTPATIENT
Start: 2024-03-11 | End: 2025-03-06

## 2024-03-11 RX ORDER — LISINOPRIL 5 MG/1
5 TABLET ORAL DAILY
Qty: 90 TABLET | Refills: 3 | OUTPATIENT
Start: 2024-03-11

## 2024-03-26 DIAGNOSIS — U09.9 POST-COVID CHRONIC COUGH: ICD-10-CM

## 2024-03-26 DIAGNOSIS — F17.200 TOBACCO USE DISORDER: ICD-10-CM

## 2024-03-26 DIAGNOSIS — R05.3 POST-COVID CHRONIC COUGH: ICD-10-CM

## 2024-03-26 RX ORDER — ALBUTEROL SULFATE 90 UG/1
2 AEROSOL, METERED RESPIRATORY (INHALATION) 4 TIMES DAILY PRN
Qty: 8 G | Refills: 5 | Status: SHIPPED | OUTPATIENT
Start: 2024-03-26

## 2024-03-26 NOTE — TELEPHONE ENCOUNTER
This medication refill is regarding a telephone request. Refill requested by patient.    Requested Prescriptions     Pending Prescriptions Disp Refills    albuterol sulfate HFA (VENTOLIN HFA) 108 (90 Base) MCG/ACT inhaler 8 g 5     Sig: Inhale 2 puffs into the lungs 4 times daily as needed for Wheezing       Date of last visit: 2/5/2024   Date of next visit: 5/7/2024  Date of last refill: 9/8/2022  8g/5    Last Lipid Panel:    Lab Results   Component Value Date/Time    CHOL 143 10/04/2023 01:38 PM    TRIG 55 10/04/2023 01:38 PM    HDL 48 10/04/2023 01:38 PM    LDLCALC 84 10/04/2023 01:38 PM     Last CMP:   Lab Results   Component Value Date     10/04/2023    K 4.6 10/04/2023    CL 99 10/04/2023    CO2 24 10/04/2023    BUN 19 10/04/2023    CREATININE 1.1 10/04/2023    GLUCOSE 120 (H) 10/04/2023    CALCIUM 9.5 10/04/2023    PROT 7.5 10/04/2023    LABALBU 4.5 10/04/2023    BILITOT 0.4 10/04/2023    ALKPHOS 67 10/04/2023    AST 18 10/04/2023    ALT 24 10/04/2023    LABGLOM >60 10/04/2023       Last Thyroid:    Lab Results   Component Value Date    TSH 0.916 10/04/2023     Last Hemoglobin A1C:    Lab Results   Component Value Date/Time    LABA1C 6.1 10/04/2023 01:38 PM       Rx verified, ordered and set to EP.

## 2024-03-27 ENCOUNTER — HOSPITAL ENCOUNTER (OUTPATIENT)
Dept: GENERAL RADIOLOGY | Age: 64
Discharge: HOME | End: 2024-03-27
Payer: MEDICARE

## 2024-03-27 ENCOUNTER — HOSPITAL ENCOUNTER (OUTPATIENT)
Age: 64
Discharge: HOME | End: 2024-03-27
Payer: MEDICARE

## 2024-03-27 ENCOUNTER — OFFICE VISIT (OUTPATIENT)
Dept: FAMILY MEDICINE CLINIC | Age: 64
End: 2024-03-27

## 2024-03-27 VITALS
BODY MASS INDEX: 24.98 KG/M2 | SYSTOLIC BLOOD PRESSURE: 174 MMHG | HEIGHT: 66 IN | HEART RATE: 77 BPM | DIASTOLIC BLOOD PRESSURE: 90 MMHG | WEIGHT: 155.4 LBS | OXYGEN SATURATION: 95 % | TEMPERATURE: 98 F | RESPIRATION RATE: 16 BRPM

## 2024-03-27 DIAGNOSIS — R05.1 ACUTE COUGH: ICD-10-CM

## 2024-03-27 DIAGNOSIS — F17.210 CIGARETTE SMOKER: ICD-10-CM

## 2024-03-27 DIAGNOSIS — J18.9 COMMUNITY ACQUIRED PNEUMONIA, UNSPECIFIED LATERALITY: Primary | ICD-10-CM

## 2024-03-27 DIAGNOSIS — J18.9 COMMUNITY ACQUIRED PNEUMONIA, UNSPECIFIED LATERALITY: ICD-10-CM

## 2024-03-27 PROBLEM — Z98.890 HISTORY OF ARTHROPLASTY OF LEFT HIP: Status: ACTIVE | Noted: 2024-03-27

## 2024-03-27 PROBLEM — Z96.642 HISTORY OF ARTHROPLASTY OF LEFT HIP: Status: ACTIVE | Noted: 2024-03-27

## 2024-03-27 LAB
INFLUENZA VIRUS A RNA: NEGATIVE
INFLUENZA VIRUS B RNA: NEGATIVE
Lab: NORMAL
QC PASS/FAIL: NORMAL
SARS-COV-2 RDRP RESP QL NAA+PROBE: NEGATIVE

## 2024-03-27 PROCEDURE — 71046 X-RAY EXAM CHEST 2 VIEWS: CPT

## 2024-03-27 RX ORDER — AMOXICILLIN AND CLAVULANATE POTASSIUM 875; 125 MG/1; MG/1
1 TABLET, FILM COATED ORAL 2 TIMES DAILY
Qty: 20 TABLET | Refills: 0 | Status: SHIPPED | OUTPATIENT
Start: 2024-03-27 | End: 2024-04-06

## 2024-03-27 RX ORDER — NEBULIZER ACCESSORIES
1 KIT MISCELLANEOUS 4 TIMES DAILY
Qty: 1 KIT | Refills: 0 | Status: SHIPPED | OUTPATIENT
Start: 2024-03-27

## 2024-03-27 RX ORDER — NEBULIZER ACCESSORIES
1 KIT MISCELLANEOUS 4 TIMES DAILY
Qty: 1 KIT | Refills: 0 | Status: SHIPPED | OUTPATIENT
Start: 2024-03-27 | End: 2024-03-27

## 2024-03-27 RX ORDER — AZITHROMYCIN 250 MG/1
TABLET, FILM COATED ORAL
Qty: 6 TABLET | Refills: 0 | Status: SHIPPED | OUTPATIENT
Start: 2024-03-27 | End: 2024-04-06

## 2024-03-27 RX ORDER — PREDNISONE 50 MG/1
50 TABLET ORAL DAILY
Qty: 5 TABLET | Refills: 0 | Status: SHIPPED | OUTPATIENT
Start: 2024-03-27 | End: 2024-04-01

## 2024-03-27 RX ORDER — IPRATROPIUM BROMIDE AND ALBUTEROL SULFATE 2.5; .5 MG/3ML; MG/3ML
1 SOLUTION RESPIRATORY (INHALATION) EVERY 4 HOURS PRN
Qty: 360 ML | Refills: 0 | Status: SHIPPED | OUTPATIENT
Start: 2024-03-27

## 2024-03-27 SDOH — ECONOMIC STABILITY: FOOD INSECURITY: WITHIN THE PAST 12 MONTHS, THE FOOD YOU BOUGHT JUST DIDN'T LAST AND YOU DIDN'T HAVE MONEY TO GET MORE.: NEVER TRUE

## 2024-03-27 SDOH — ECONOMIC STABILITY: FOOD INSECURITY: WITHIN THE PAST 12 MONTHS, YOU WORRIED THAT YOUR FOOD WOULD RUN OUT BEFORE YOU GOT MONEY TO BUY MORE.: NEVER TRUE

## 2024-03-27 SDOH — ECONOMIC STABILITY: INCOME INSECURITY: HOW HARD IS IT FOR YOU TO PAY FOR THE VERY BASICS LIKE FOOD, HOUSING, MEDICAL CARE, AND HEATING?: NOT HARD AT ALL

## 2024-03-27 ASSESSMENT — ENCOUNTER SYMPTOMS
NAUSEA: 0
SHORTNESS OF BREATH: 1
WHEEZING: 1
EYE PAIN: 0
ABDOMINAL PAIN: 0
SORE THROAT: 1
CHEST TIGHTNESS: 0
COUGH: 1
RHINORRHEA: 1
VOMITING: 0

## 2024-03-27 NOTE — PROGRESS NOTES
include no chest pain, chills, ear congestion, ear pain, fever, headaches, myalgias, nasal congestion, postnasal drip, rash or sweats. Nothing aggravates the symptoms. He has tried a beta-agonist inhaler for the symptoms. His past medical history is significant for pneumonia.       Review of Systems   Constitutional:  Positive for fatigue. Negative for chills and fever.   HENT:  Positive for rhinorrhea and sore throat. Negative for congestion, ear pain and postnasal drip.    Eyes:  Negative for pain and visual disturbance.   Respiratory:  Positive for cough, shortness of breath and wheezing. Negative for chest tightness.    Cardiovascular:  Negative for chest pain and palpitations.   Gastrointestinal:  Negative for abdominal pain, nausea and vomiting.   Genitourinary:  Negative for decreased urine volume and dysuria.   Musculoskeletal:  Negative for myalgias.   Skin:  Negative for rash.   Neurological:  Negative for dizziness, weakness and headaches.   Psychiatric/Behavioral:  Negative for dysphoric mood. The patient is not nervous/anxious.        Physical Exam  Constitutional:       General: He is not in acute distress.     Appearance: Normal appearance. He is ill-appearing.   HENT:      Head: Normocephalic.      Right Ear: Tympanic membrane, ear canal and external ear normal.      Left Ear: Tympanic membrane, ear canal and external ear normal.      Nose: Nose normal.      Mouth/Throat:      Mouth: Mucous membranes are moist.   Eyes:      Conjunctiva/sclera: Conjunctivae normal.   Cardiovascular:      Rate and Rhythm: Normal rate and regular rhythm.      Heart sounds: Normal heart sounds.   Pulmonary:      Effort: Pulmonary effort is normal.      Breath sounds: Wheezing and rhonchi present.   Musculoskeletal:         General: Normal range of motion.      Cervical back: Neck supple.   Lymphadenopathy:      Cervical: No cervical adenopathy.   Skin:     General: Skin is warm and dry.      Findings: No rash.

## 2024-03-28 ENCOUNTER — TELEPHONE (OUTPATIENT)
Dept: FAMILY MEDICINE CLINIC | Age: 64
End: 2024-03-28

## 2024-03-28 DIAGNOSIS — K21.9 GASTROESOPHAGEAL REFLUX DISEASE WITHOUT ESOPHAGITIS: ICD-10-CM

## 2024-03-28 RX ORDER — OMEPRAZOLE 20 MG/1
20 CAPSULE, DELAYED RELEASE ORAL DAILY
Qty: 90 CAPSULE | Refills: 0 | Status: SHIPPED | OUTPATIENT
Start: 2024-03-28

## 2024-03-28 NOTE — TELEPHONE ENCOUNTER
Spoke with patient. He was able to get the nebulizer and all the supplies and has already completed 3 treatments and is already noticing a slight improvement.

## 2024-03-28 NOTE — TELEPHONE ENCOUNTER
[place] called requesting a refill on the following medications:  Requested Prescriptions     Pending Prescriptions Disp Refills    omeprazole (PRILOSEC) 20 MG delayed release capsule 90 capsule 0     Sig: Take 1 capsule by mouth daily       Date of last visit: 3/27/2024  Date of next visit (if applicable):5/7/2024  Date of last refill: 01/09/24  Pharm[facility] Name: [facility],  Sveta Ferrer MA

## 2024-03-28 NOTE — TELEPHONE ENCOUNTER
----- Message from FREDIS Starr - CNP sent at 3/28/2024  9:12 AM EDT -----  Xray does show possible pneumonia. Did he get the nebulizer? How is he feeling today?

## 2024-04-07 ENCOUNTER — HOSPITAL ENCOUNTER (OUTPATIENT)
Age: 64
Setting detail: OBSERVATION
Discharge: HOME | End: 2024-04-08
Attending: EMERGENCY MEDICINE | Admitting: HOSPITALIST
Payer: MEDICARE

## 2024-04-07 ENCOUNTER — APPOINTMENT (OUTPATIENT)
Dept: GENERAL RADIOLOGY | Age: 64
End: 2024-04-07
Payer: MEDICARE

## 2024-04-07 ENCOUNTER — APPOINTMENT (OUTPATIENT)
Dept: CT IMAGING | Age: 64
End: 2024-04-07
Payer: MEDICARE

## 2024-04-07 DIAGNOSIS — J44.1 COPD EXACERBATION (HCC): ICD-10-CM

## 2024-04-07 DIAGNOSIS — J96.01 ACUTE HYPOXEMIC RESPIRATORY FAILURE (HCC): Primary | ICD-10-CM

## 2024-04-07 PROBLEM — Z87.19 HISTORY OF GASTROESOPHAGEAL REFLUX (GERD): Status: ACTIVE | Noted: 2024-04-07

## 2024-04-07 PROBLEM — Z78.9 FAILURE OF OUTPATIENT TREATMENT: Status: ACTIVE | Noted: 2024-04-07

## 2024-04-07 LAB
ACINETOBACTER CALCOACETICUS-BAUMANNII BY PCR: NOT DETECTED
ALBUMIN SERPL BCG-MCNC: 4.8 G/DL (ref 3.5–5.1)
ALP SERPL-CCNC: 73 U/L (ref 38–126)
ALT SERPL W/O P-5'-P-CCNC: 35 U/L (ref 11–66)
ANION GAP SERPL CALC-SCNC: 15 MEQ/L (ref 8–16)
ARTERIAL PATENCY WRIST A: POSITIVE
AST SERPL-CCNC: 22 U/L (ref 5–40)
BASE EXCESS BLDA CALC-SCNC: -1.8 MMOL/L (ref -2.5–2.5)
BASOPHILS ABSOLUTE: 0.1 THOU/MM3 (ref 0–0.1)
BASOPHILS NFR BLD AUTO: 0.7 %
BDY SITE: ABNORMAL
BILIRUB SERPL-MCNC: 0.3 MG/DL (ref 0.3–1.2)
BLACTX-M ISLT/SPM QL: NOT DETECTED
BLAIMP ISLT/SPM QL: NOT DETECTED
BLAKPC ISLT/SPM QL: NOT DETECTED
BLAOXA-48-LIKE ISLT/SPM QL: NOT DETECTED
BLAVIM ISLT/SPM QL: NOT DETECTED
BUN SERPL-MCNC: 19 MG/DL (ref 7–22)
C PNEUM DNA LOWER RESP QL NAA+NON-PROBE: NOT DETECTED
CALCIUM SERPL-MCNC: 9.5 MG/DL (ref 8.5–10.5)
CHLORIDE SERPL-SCNC: 97 MEQ/L (ref 98–111)
CO2 SERPL-SCNC: 23 MEQ/L (ref 23–33)
COLLECTED BY:: ABNORMAL
CREAT SERPL-MCNC: 0.9 MG/DL (ref 0.4–1.2)
DEPRECATED RDW RBC AUTO: 48.6 FL (ref 35–45)
DEVICE: ABNORMAL
ENTEROBACTER CLOACAE COMPLEX BY PCR: NOT DETECTED
EOSINOPHIL NFR BLD AUTO: 16.7 %
EOSINOPHILS ABSOLUTE: 1.7 THOU/MM3 (ref 0–0.4)
ERYTHROCYTE [DISTWIDTH] IN BLOOD BY AUTOMATED COUNT: 15.1 % (ref 11.5–14.5)
ESCHERICHIA COLI BY PCR: NOT DETECTED
FIO2 ON VENT O2 ANALYZER: 6 %
FLUAV RNA LOWER RESP QL NAA+NON-PROBE: NOT DETECTED
FLUAV RNA RESP QL NAA+PROBE: NOT DETECTED
FLUAV RNA RESP QL NAA+PROBE: NOT DETECTED
FLUBV RNA LOWER RESP QL NAA+NON-PROBE: NOT DETECTED
FLUBV RNA RESP QL NAA+PROBE: NOT DETECTED
FLUBV RNA RESP QL NAA+PROBE: NOT DETECTED
GFR SERPL CREATININE-BSD FRML MDRD: > 90 ML/MIN/1.73M2
GLUCOSE BLD STRIP.AUTO-MCNC: 123 MG/DL (ref 70–108)
GLUCOSE SERPL-MCNC: 118 MG/DL (ref 70–108)
HADV DNA LOWER RESP QL NAA+NON-PROBE: NOT DETECTED
HAEMOPHILUS INFLUENZAE BY PCR: DETECTED
HCO3 BLDA-SCNC: 24 MMOL/L (ref 23–28)
HCOV RNA LOWER RESP QL NAA+NON-PROBE: NOT DETECTED
HCT VFR BLD AUTO: 47.5 % (ref 42–52)
HGB BLD-MCNC: 15.5 GM/DL (ref 14–18)
HMPV RNA LOWER RESP QL NAA+NON-PROBE: NOT DETECTED
HPIV RNA LOWER RESP QL NAA+NON-PROBE: NOT DETECTED
IMM GRANULOCYTES # BLD AUTO: 0.08 THOU/MM3 (ref 0–0.07)
IMM GRANULOCYTES NFR BLD AUTO: 0.8 %
KLEBSIELLA AEROGENES BY PCR: NOT DETECTED
KLEBSIELLA OXYTOCA BY PCR: DETECTED
KLEBSIELLA PNEUMONIAE GROUP BY PCR: NOT DETECTED
L PNEUMO DNA LOWER RESP QL NAA+NON-PROBE: NOT DETECTED
LYMPHOCYTES ABSOLUTE: 3.1 THOU/MM3 (ref 1–4.8)
LYMPHOCYTES NFR BLD AUTO: 29.9 %
M PNEUMO DNA LOWER RESP QL NAA+NON-PROBE: NOT DETECTED
MCH RBC QN AUTO: 28.9 PG (ref 26–33)
MCHC RBC AUTO-ENTMCNC: 32.6 GM/DL (ref 32.2–35.5)
MCV RBC AUTO: 88.6 FL (ref 80–94)
MONOCYTES ABSOLUTE: 0.8 THOU/MM3 (ref 0.4–1.3)
MONOCYTES NFR BLD AUTO: 7.4 %
MORAXELLA CATARRHALIS BY PCR: NOT DETECTED
MRSA DNA SPEC QL NAA+PROBE: NEGATIVE
NEUTROPHILS NFR BLD AUTO: 44.5 %
NRBC BLD AUTO-RTO: 0 /100 WBC
NT-PROBNP SERPL IA-MCNC: 39.5 PG/ML (ref 0–124)
OSMOLALITY SERPL CALC.SUM OF ELEC: 273.4 MOSMOL/KG (ref 275–300)
PCO2 BLDA: 45 MMHG (ref 35–45)
PH BLDA: 7.34 [PH] (ref 7.35–7.45)
PLATELET # BLD AUTO: 287 THOU/MM3 (ref 130–400)
PMV BLD AUTO: 10.1 FL (ref 9.4–12.4)
PO2 BLDA: 96 MMHG (ref 71–104)
POTASSIUM SERPL-SCNC: 4.3 MEQ/L (ref 3.5–5.2)
PROCALCITONIN SERPL IA-MCNC: 0.07 NG/ML (ref 0.01–0.09)
PROT SERPL-MCNC: 7.8 G/DL (ref 6.1–8)
PROTEUS SPECIES BY PCR: NOT DETECTED
PSEUDOMONAS AERUGINOSA BY PCR: NOT DETECTED
RBC # BLD AUTO: 5.36 MILL/MM3 (ref 4.7–6.1)
RESISTANT GENE MECA/C & MREJ BY PCR: ABNORMAL
RESISTANT GENE NDM BY PCR: NOT DETECTED
RSV RNA LOWER RESP QL NAA+NON-PROBE: NOT DETECTED
RV+EV RNA LOWER RESP QL NAA+NON-PROBE: NOT DETECTED
SAO2 % BLDA: 97 %
SARS-COV-2 RNA RESP QL NAA+PROBE: NOT DETECTED
SARS-COV-2 RNA RESP QL NAA+PROBE: NOT DETECTED
SEGMENTED NEUTROPHILS ABSOLUTE COUNT: 4.6 THOU/MM3 (ref 1.8–7.7)
SERRATIA MARCESCENS BY PCR: NOT DETECTED
SODIUM SERPL-SCNC: 135 MEQ/L (ref 135–145)
SOURCE: ABNORMAL
SPECIMEN ACCEPTABILITY: ABNORMAL
STAPH AUREUS BY PCR: NOT DETECTED
STREP AGALACTIAE BY PCR: NOT DETECTED
STREP PNEUMONIAE BY PCR: NOT DETECTED
STREP PYOGENES BY PCR: NOT DETECTED
TROPONIN, HIGH SENSITIVITY: 16 NG/L (ref 0–12)
WBC # BLD AUTO: 10.3 THOU/MM3 (ref 4.8–10.8)

## 2024-04-07 PROCEDURE — 2580000003 HC RX 258: Performed by: NURSE PRACTITIONER

## 2024-04-07 PROCEDURE — 99223 1ST HOSP IP/OBS HIGH 75: CPT | Performed by: NURSE PRACTITIONER

## 2024-04-07 PROCEDURE — 6370000000 HC RX 637 (ALT 250 FOR IP): Performed by: EMERGENCY MEDICINE

## 2024-04-07 PROCEDURE — 2580000003 HC RX 258

## 2024-04-07 PROCEDURE — 82948 REAGENT STRIP/BLOOD GLUCOSE: CPT

## 2024-04-07 PROCEDURE — 94669 MECHANICAL CHEST WALL OSCILL: CPT

## 2024-04-07 PROCEDURE — 84145 PROCALCITONIN (PCT): CPT

## 2024-04-07 PROCEDURE — 6370000000 HC RX 637 (ALT 250 FOR IP): Performed by: NURSE PRACTITIONER

## 2024-04-07 PROCEDURE — 85025 COMPLETE CBC W/AUTO DIFF WBC: CPT

## 2024-04-07 PROCEDURE — 87641 MR-STAPH DNA AMP PROBE: CPT

## 2024-04-07 PROCEDURE — 2700000000 HC OXYGEN THERAPY PER DAY

## 2024-04-07 PROCEDURE — 87636 SARSCOV2 & INF A&B AMP PRB: CPT

## 2024-04-07 PROCEDURE — 87205 SMEAR GRAM STAIN: CPT

## 2024-04-07 PROCEDURE — 87631 RESP VIRUS 3-5 TARGETS: CPT

## 2024-04-07 PROCEDURE — 87541 LEGION PNEUMO DNA AMP PROB: CPT

## 2024-04-07 PROCEDURE — 87486 CHLMYD PNEUM DNA AMP PROBE: CPT

## 2024-04-07 PROCEDURE — 6370000000 HC RX 637 (ALT 250 FOR IP)

## 2024-04-07 PROCEDURE — 87150 DNA/RNA AMPLIFIED PROBE: CPT

## 2024-04-07 PROCEDURE — 87581 M.PNEUMON DNA AMP PROBE: CPT

## 2024-04-07 PROCEDURE — 87186 SC STD MICRODIL/AGAR DIL: CPT

## 2024-04-07 PROCEDURE — 96365 THER/PROPH/DIAG IV INF INIT: CPT

## 2024-04-07 PROCEDURE — 96361 HYDRATE IV INFUSION ADD-ON: CPT

## 2024-04-07 PROCEDURE — 71275 CT ANGIOGRAPHY CHEST: CPT

## 2024-04-07 PROCEDURE — 93005 ELECTROCARDIOGRAM TRACING: CPT | Performed by: EMERGENCY MEDICINE

## 2024-04-07 PROCEDURE — 80053 COMPREHEN METABOLIC PANEL: CPT

## 2024-04-07 PROCEDURE — 96374 THER/PROPH/DIAG INJ IV PUSH: CPT

## 2024-04-07 PROCEDURE — 99233 SBSQ HOSP IP/OBS HIGH 50: CPT | Performed by: HOSPITALIST

## 2024-04-07 PROCEDURE — G0378 HOSPITAL OBSERVATION PER HR: HCPCS

## 2024-04-07 PROCEDURE — 84484 ASSAY OF TROPONIN QUANT: CPT

## 2024-04-07 PROCEDURE — 6360000002 HC RX W HCPCS: Performed by: EMERGENCY MEDICINE

## 2024-04-07 PROCEDURE — 99285 EMERGENCY DEPT VISIT HI MDM: CPT

## 2024-04-07 PROCEDURE — 87798 DETECT AGENT NOS DNA AMP: CPT

## 2024-04-07 PROCEDURE — 87040 BLOOD CULTURE FOR BACTERIA: CPT

## 2024-04-07 PROCEDURE — 93010 ELECTROCARDIOGRAM REPORT: CPT | Performed by: INTERNAL MEDICINE

## 2024-04-07 PROCEDURE — 2580000003 HC RX 258: Performed by: EMERGENCY MEDICINE

## 2024-04-07 PROCEDURE — 96375 TX/PRO/DX INJ NEW DRUG ADDON: CPT

## 2024-04-07 PROCEDURE — 82803 BLOOD GASES ANY COMBINATION: CPT

## 2024-04-07 PROCEDURE — 94640 AIRWAY INHALATION TREATMENT: CPT

## 2024-04-07 PROCEDURE — 96376 TX/PRO/DX INJ SAME DRUG ADON: CPT

## 2024-04-07 PROCEDURE — 94761 N-INVAS EAR/PLS OXIMETRY MLT: CPT

## 2024-04-07 PROCEDURE — 87070 CULTURE OTHR SPECIMN AEROBIC: CPT

## 2024-04-07 PROCEDURE — 6360000004 HC RX CONTRAST MEDICATION: Performed by: EMERGENCY MEDICINE

## 2024-04-07 PROCEDURE — 87077 CULTURE AEROBIC IDENTIFY: CPT

## 2024-04-07 PROCEDURE — 36415 COLL VENOUS BLD VENIPUNCTURE: CPT

## 2024-04-07 PROCEDURE — 36600 WITHDRAWAL OF ARTERIAL BLOOD: CPT

## 2024-04-07 PROCEDURE — 96372 THER/PROPH/DIAG INJ SC/IM: CPT

## 2024-04-07 PROCEDURE — 6360000002 HC RX W HCPCS: Performed by: NURSE PRACTITIONER

## 2024-04-07 PROCEDURE — 2060000000 HC ICU INTERMEDIATE R&B

## 2024-04-07 PROCEDURE — 83880 ASSAY OF NATRIURETIC PEPTIDE: CPT

## 2024-04-07 PROCEDURE — 71046 X-RAY EXAM CHEST 2 VIEWS: CPT

## 2024-04-07 PROCEDURE — 6360000002 HC RX W HCPCS

## 2024-04-07 RX ORDER — SODIUM CHLORIDE 0.9 % (FLUSH) 0.9 %
5-40 SYRINGE (ML) INJECTION EVERY 12 HOURS SCHEDULED
Status: DISCONTINUED | OUTPATIENT
Start: 2024-04-07 | End: 2024-04-08 | Stop reason: HOSPADM

## 2024-04-07 RX ORDER — POTASSIUM CHLORIDE 20 MEQ/1
40 TABLET, EXTENDED RELEASE ORAL PRN
Status: DISCONTINUED | OUTPATIENT
Start: 2024-04-07 | End: 2024-04-08 | Stop reason: HOSPADM

## 2024-04-07 RX ORDER — PREGABALIN 75 MG/1
75 CAPSULE ORAL 2 TIMES DAILY
Status: DISCONTINUED | OUTPATIENT
Start: 2024-04-07 | End: 2024-04-07

## 2024-04-07 RX ORDER — ONDANSETRON 4 MG/1
4 TABLET, ORALLY DISINTEGRATING ORAL EVERY 8 HOURS PRN
Status: DISCONTINUED | OUTPATIENT
Start: 2024-04-07 | End: 2024-04-07 | Stop reason: CLARIF

## 2024-04-07 RX ORDER — ONDANSETRON 2 MG/ML
4 INJECTION INTRAMUSCULAR; INTRAVENOUS EVERY 6 HOURS PRN
Status: DISCONTINUED | OUTPATIENT
Start: 2024-04-07 | End: 2024-04-07 | Stop reason: CLARIF

## 2024-04-07 RX ORDER — SODIUM CHLORIDE 9 MG/ML
INJECTION, SOLUTION INTRAVENOUS PRN
Status: DISCONTINUED | OUTPATIENT
Start: 2024-04-07 | End: 2024-04-08 | Stop reason: HOSPADM

## 2024-04-07 RX ORDER — ONDANSETRON 2 MG/ML
4 INJECTION INTRAMUSCULAR; INTRAVENOUS EVERY 6 HOURS PRN
Status: DISCONTINUED | OUTPATIENT
Start: 2024-04-07 | End: 2024-04-08 | Stop reason: HOSPADM

## 2024-04-07 RX ORDER — IPRATROPIUM BROMIDE AND ALBUTEROL SULFATE 2.5; .5 MG/3ML; MG/3ML
1 SOLUTION RESPIRATORY (INHALATION) EVERY 4 HOURS PRN
Status: DISCONTINUED | OUTPATIENT
Start: 2024-04-07 | End: 2024-04-07

## 2024-04-07 RX ORDER — PANTOPRAZOLE SODIUM 40 MG/1
40 TABLET, DELAYED RELEASE ORAL
Status: DISCONTINUED | OUTPATIENT
Start: 2024-04-07 | End: 2024-04-08 | Stop reason: HOSPADM

## 2024-04-07 RX ORDER — POTASSIUM CHLORIDE 7.45 MG/ML
10 INJECTION INTRAVENOUS PRN
Status: DISCONTINUED | OUTPATIENT
Start: 2024-04-07 | End: 2024-04-08 | Stop reason: HOSPADM

## 2024-04-07 RX ORDER — IPRATROPIUM BROMIDE AND ALBUTEROL SULFATE 2.5; .5 MG/3ML; MG/3ML
1 SOLUTION RESPIRATORY (INHALATION)
Status: DISCONTINUED | OUTPATIENT
Start: 2024-04-07 | End: 2024-04-08 | Stop reason: HOSPADM

## 2024-04-07 RX ORDER — AZITHROMYCIN 250 MG/1
500 TABLET, FILM COATED ORAL ONCE
Status: COMPLETED | OUTPATIENT
Start: 2024-04-07 | End: 2024-04-07

## 2024-04-07 RX ORDER — LANOLIN ALCOHOL/MO/W.PET/CERES
3 CREAM (GRAM) TOPICAL NIGHTLY PRN
Status: DISCONTINUED | OUTPATIENT
Start: 2024-04-07 | End: 2024-04-08 | Stop reason: HOSPADM

## 2024-04-07 RX ORDER — ACETAMINOPHEN 650 MG/1
650 SUPPOSITORY RECTAL EVERY 6 HOURS PRN
Status: DISCONTINUED | OUTPATIENT
Start: 2024-04-07 | End: 2024-04-08 | Stop reason: HOSPADM

## 2024-04-07 RX ORDER — AZITHROMYCIN 250 MG/1
500 TABLET, FILM COATED ORAL ONCE
Status: DISCONTINUED | OUTPATIENT
Start: 2024-04-08 | End: 2024-04-07

## 2024-04-07 RX ORDER — IPRATROPIUM BROMIDE AND ALBUTEROL SULFATE 2.5; .5 MG/3ML; MG/3ML
2 SOLUTION RESPIRATORY (INHALATION) ONCE
Status: COMPLETED | OUTPATIENT
Start: 2024-04-07 | End: 2024-04-07

## 2024-04-07 RX ORDER — ACETAMINOPHEN 325 MG/1
650 TABLET ORAL EVERY 6 HOURS PRN
Status: DISCONTINUED | OUTPATIENT
Start: 2024-04-07 | End: 2024-04-08 | Stop reason: HOSPADM

## 2024-04-07 RX ORDER — MAGNESIUM SULFATE IN WATER 40 MG/ML
2000 INJECTION, SOLUTION INTRAVENOUS PRN
Status: DISCONTINUED | OUTPATIENT
Start: 2024-04-07 | End: 2024-04-08 | Stop reason: HOSPADM

## 2024-04-07 RX ORDER — SODIUM CHLORIDE 0.9 % (FLUSH) 0.9 %
5-40 SYRINGE (ML) INJECTION PRN
Status: DISCONTINUED | OUTPATIENT
Start: 2024-04-07 | End: 2024-04-08 | Stop reason: HOSPADM

## 2024-04-07 RX ORDER — GUAIFENESIN 600 MG/1
600 TABLET, EXTENDED RELEASE ORAL 2 TIMES DAILY
Status: DISCONTINUED | OUTPATIENT
Start: 2024-04-07 | End: 2024-04-08 | Stop reason: HOSPADM

## 2024-04-07 RX ORDER — ENOXAPARIN SODIUM 100 MG/ML
40 INJECTION SUBCUTANEOUS DAILY
Status: DISCONTINUED | OUTPATIENT
Start: 2024-04-07 | End: 2024-04-08 | Stop reason: HOSPADM

## 2024-04-07 RX ORDER — POLYETHYLENE GLYCOL 3350 17 G/17G
17 POWDER, FOR SOLUTION ORAL DAILY PRN
Status: DISCONTINUED | OUTPATIENT
Start: 2024-04-07 | End: 2024-04-08 | Stop reason: HOSPADM

## 2024-04-07 RX ORDER — 0.9 % SODIUM CHLORIDE 0.9 %
1000 INTRAVENOUS SOLUTION INTRAVENOUS ONCE
Status: COMPLETED | OUTPATIENT
Start: 2024-04-07 | End: 2024-04-07

## 2024-04-07 RX ORDER — ONDANSETRON 4 MG/1
4 TABLET, ORALLY DISINTEGRATING ORAL EVERY 8 HOURS PRN
Status: DISCONTINUED | OUTPATIENT
Start: 2024-04-07 | End: 2024-04-08 | Stop reason: HOSPADM

## 2024-04-07 RX ORDER — PREDNISONE 20 MG/1
40 TABLET ORAL DAILY
Status: DISCONTINUED | OUTPATIENT
Start: 2024-04-10 | End: 2024-04-08 | Stop reason: HOSPADM

## 2024-04-07 RX ADMIN — IPRATROPIUM BROMIDE AND ALBUTEROL SULFATE 1 DOSE: .5; 3 SOLUTION RESPIRATORY (INHALATION) at 08:05

## 2024-04-07 RX ADMIN — LISINOPRIL 30 MG: 20 TABLET ORAL at 11:01

## 2024-04-07 RX ADMIN — IOPAMIDOL 80 ML: 755 INJECTION, SOLUTION INTRAVENOUS at 04:18

## 2024-04-07 RX ADMIN — IPRATROPIUM BROMIDE AND ALBUTEROL SULFATE 2 DOSE: .5; 3 SOLUTION RESPIRATORY (INHALATION) at 04:32

## 2024-04-07 RX ADMIN — AZITHROMYCIN DIHYDRATE 500 MG: 250 TABLET ORAL at 04:55

## 2024-04-07 RX ADMIN — GUAIFENESIN 600 MG: 600 TABLET, EXTENDED RELEASE ORAL at 11:03

## 2024-04-07 RX ADMIN — GUAIFENESIN 600 MG: 600 TABLET, EXTENDED RELEASE ORAL at 21:13

## 2024-04-07 RX ADMIN — IPRATROPIUM BROMIDE AND ALBUTEROL SULFATE 1 DOSE: .5; 3 SOLUTION RESPIRATORY (INHALATION) at 20:40

## 2024-04-07 RX ADMIN — SODIUM CHLORIDE 1000 ML: 9 INJECTION, SOLUTION INTRAVENOUS at 04:02

## 2024-04-07 RX ADMIN — SODIUM CHLORIDE, PRESERVATIVE FREE 10 ML: 5 INJECTION INTRAVENOUS at 21:16

## 2024-04-07 RX ADMIN — SODIUM CHLORIDE, PRESERVATIVE FREE 10 ML: 5 INJECTION INTRAVENOUS at 07:48

## 2024-04-07 RX ADMIN — ACETAMINOPHEN 650 MG: 325 TABLET ORAL at 11:01

## 2024-04-07 RX ADMIN — METHYLPREDNISOLONE SODIUM SUCCINATE 125 MG: 125 INJECTION INTRAMUSCULAR; INTRAVENOUS at 04:02

## 2024-04-07 RX ADMIN — CEFTRIAXONE SODIUM 1000 MG: 1 INJECTION, POWDER, FOR SOLUTION INTRAMUSCULAR; INTRAVENOUS at 21:20

## 2024-04-07 RX ADMIN — IPRATROPIUM BROMIDE AND ALBUTEROL SULFATE 1 DOSE: .5; 3 SOLUTION RESPIRATORY (INHALATION) at 12:25

## 2024-04-07 RX ADMIN — IPRATROPIUM BROMIDE AND ALBUTEROL SULFATE 1 DOSE: .5; 3 SOLUTION RESPIRATORY (INHALATION) at 16:17

## 2024-04-07 RX ADMIN — PANTOPRAZOLE SODIUM 40 MG: 40 TABLET, DELAYED RELEASE ORAL at 07:47

## 2024-04-07 RX ADMIN — SODIUM CHLORIDE: 9 INJECTION, SOLUTION INTRAVENOUS at 21:19

## 2024-04-07 RX ADMIN — ENOXAPARIN SODIUM 40 MG: 100 INJECTION SUBCUTANEOUS at 07:47

## 2024-04-07 RX ADMIN — METHYLPREDNISOLONE SODIUM SUCCINATE 40 MG: 40 INJECTION INTRAMUSCULAR; INTRAVENOUS at 16:19

## 2024-04-07 ASSESSMENT — PAIN DESCRIPTION - DESCRIPTORS: DESCRIPTORS: ACHING

## 2024-04-07 ASSESSMENT — PAIN SCALES - GENERAL
PAINLEVEL_OUTOF10: 6
PAINLEVEL_OUTOF10: 0

## 2024-04-07 ASSESSMENT — PAIN - FUNCTIONAL ASSESSMENT: PAIN_FUNCTIONAL_ASSESSMENT: 0-10

## 2024-04-07 ASSESSMENT — PAIN DESCRIPTION - LOCATION: LOCATION: CHEST

## 2024-04-07 NOTE — PLAN OF CARE
Internal Medicine Resident Plan of Care    Patient:  South Nettles    YOB: 1960  Unit/Bed:4K-27/027-A  MRN: [id number]    Acct: [id number]   PCP: Marsha Ireland MD    Date of Admission: 4/7/2024    Assessment/Plan:  Acute hypoxic respiratory failure. Secondary to COPD exacerbation. Requiring heated high flow nasal cannula, 50% FiO2, 50 L/min. On room air at baseline outpatient.  Continue supplemental O2, titrate to SpO2 >88-94%, wean as tolerated.  Encourage aggressive pulmonary hygiene: IS and acapella ordered.  Recommend OP follow up with pulmonology  Continue further management as noted below.     COPD, exacerbated. Likely secondary to respiratory tract infection. In with increased dyspnea, cough, sputum purulence, chest tightness, pleuritic chest pain. No PFTs in epic, however mild central lobar emphysema and right upper lobe scarring noted on CTA chest, abdomen.  On room air at baseline. Procal on admit 0.07.  Current smoker, still smokes on some days despite chart stating he quit smoking on 2/27/2023.  On DuoNebs and albuterol outpatient.  Continue azithromycin x 3 doses.  Complete course of prednisolone ordered on admission, then switch to oral prednisone x 3 doses.  Continue DuoNebs every 4 hours while awake  Continue Guaifenasin  Encourage pulmonary hygiene with IS and acapella.     Glucose intolerance.  Not on outpatient therapies.  Consider point-of-care glucose checks and sliding scale pending clinical course in setting of steroids.    HTN.   Continue home lisinopril.    GERD.  Continue PPI.    Chronic back pain.  As needed analgesics, adjust as needed.    History of PUD.  Noted.    Subjective (past 24 hours):   Patient seen and examined at bedside.  Patient on high flow at 50% FiO2 at 50 L/min.  Patient endorses ongoing mild dyspnea, however states that he feels much better than on presentation.  Denies any home oxygen use at baseline.  Notes that he does have a chronic  cough and has had increased sputum production over the last month, however cough has been nonproductive this morning.  Denies any new questions or concerns.      LDA: Left antecubital PIV, right antecubital PIV  Antibiotics: Received azithromycin in ED.  Steroids: Received 125 mg of methylprednisolone.  Labs (still needed?): []Yes / [x]No  IVF (still needed?): []Yes / [x]No    Level of care: [x]Step Down / []Med-Surg  Bed Status: [x]Inpatient / []Observation  Telemetry: [x]Yes / []No  PT/OT: Will evaluate    DVT Prophylaxis: [x] Lovenox / [] Heparin / [] SCDs / [] Already on Systemic Anticoagulation / [] None         Electronically signed by Esther Davis MD, IM-[id number] on 4/7/2024 at 6:46 AM    Case was discussed with Attending, Dr. Sim

## 2024-04-07 NOTE — ED NOTES
Spoke to [facility]K staff approve pt transport to Replaced by [facility] in stable condition.

## 2024-04-07 NOTE — ED NOTES
Patient resting in bed. Updated on results and plan of care. Denies any needs. Call light in reach.

## 2024-04-07 NOTE — PLAN OF CARE
Problem: Respiratory - Adult  Goal: Achieves optimal ventilation and oxygenation  Outcome: Progressing  Flowsheets (Taken 4/7/2024 0810)  Achieves optimal ventilation and oxygenation:   Assess for changes in respiratory status   Position to facilitate oxygenation and minimize respiratory effort   Assess and instruct to report shortness of breath or any respiratory difficulty   Respiratory therapy support as indicated

## 2024-04-07 NOTE — H&P
Hospitalist  History and Physical    Patient:  South Nettles  MRN: [id number]    CHIEF COMPLAINT:  shortness of breath    History Obtained From:  patient, electronic medical record  PCP: Marsha Ireland MD    HISTORY OF PRESENT ILLNESS:   South Nettles is a 63 year old gentlemen presented to [facility] ER 4/7/24 with chief complaint of shortness of breath and hypoxia.    Patient has past medical history significant for current everyday smoker, likely COPD no PFTs noted on epic review, essential hypertension, GERD, chronic cough.     Patient was seen by PCP on 3/27/2024 with complaint of cough, wheezing, shortness of breath and fatigue.  COVID-19 and influenza A/B were negative.  Patient was treated with Zithromax/Augmentin/Prednisone and was given DuoNeb aerosol treatments.  Patient states he noted some improvement, however over the past 4 to 5 days he has noted increased in wheezing and shortness of breath.  Patient denies any fever.  Positive for productive cough with yellow sputum.  Positive for pleuritic chest pain with deep inspiration and coughing.  Patient states he gave himself back-to-back aerosol treatments prior to seeking treatment through the ER this evening but received no improvement. SaO2=83% on room air this did improve to 95% on 6L/NC CTA and ABG was completed.  While in the emergency room patient did receive Zithromax/DuoNeb aerosol/Solu-Medrol/0.9NS 1L.      Past Medical History:        Diagnosis Date    Bilateral inguinal hernia 12/14/2011    Chronic back pain     Cough     patient is a  has a chronic cough    Essential hypertension 04/01/2016    GERD (gastroesophageal reflux disease)     Osteoarthritis     Recurrent left inguinal hernia     Stomach ulcer        Past Surgical History:        Procedure Laterality Date    ARM SURGERY Left 03/2020    [facility]    [facility]    [facility]  2019    [facility]    HERNIA REPAIR      child    HERNIA REPAIR  01/03/2012

## 2024-04-07 NOTE — ED NOTES
ED to inpatient nurses report      Chief Complaint:  Chief Complaint   Patient presents with    Shortness of Breath     Present to ED from: home    MOA:     LOC: alert and orientated to name, place, date  Mobility: Requires assistance * 1  Oxygen Baseline: room air    Current needs required: heated high flow     Code Status:   Full Code    What abnormal results were found and what did you give/do to treat them? Hypoxic- admission  Any procedures or intervention occur? Heated high flow started    Mental Status:  Level of Consciousness: Alert (0)    Psych Assessment:        Vitals:  Patient Vitals for the past 24 hrs:   BP Temp Temp src Pulse Resp SpO2 Height Weight   04/07/24 0558 (!) 162/97 -- -- 98 17 96 % -- --   04/07/24 0513 (!) 173/90 -- -- 100 21 98 % -- --   04/07/24 0435 -- -- -- 93 20 97 % -- --   04/07/24 0428 (!) 164/89 -- -- 95 22 98 % -- --   04/07/24 0352 (!) 146/89 -- -- 96 20 97 % -- --   04/07/24 0338 -- -- -- -- -- -- 1.702 m (5' 7\") 70.3 kg (155 lb)   04/07/24 0337 (!) 185/107 97.9 °F (36.6 °C) Oral -- -- 95 % -- --   04/07/24 0335 -- -- Oral (!) 117 27 (!) 83 % -- --        LDAs:   Peripheral IV 04/07/24 Left Antecubital (Active)   Site Assessment Clean, dry & intact 04/07/24 0341       Peripheral IV 04/07/24 Right Antecubital (Active)   Site Assessment Clean, dry & intact 04/07/24 0402       Ambulatory Status:  No data recorded    Diagnosis:  DISPOSITION Admitted 04/07/2024 05:45:02 AM   Final diagnoses:   Acute hypoxemic respiratory failure (HCC)   COPD exacerbation (HCC)        Consults:  IP CONSULT TO PULMONOLOGY     Pain Score:  Pain Assessment  Pain Assessment: 0-10  Pain Level: 6  Pain Location: Chest  Pain Descriptors: Aching    C-SSRS:   Risk of Suicide: No Risk    Sepsis Screening:  Sepsis Risk Score: 2.65    Xenia Fall Risk:       Swallow Screening        Preferred Language:   English      ALLERGIES     Sulfa antibiotics    SURGICAL HISTORY       Past Surgical History:   Procedure

## 2024-04-07 NOTE — ED PROVIDER NOTES
135 - 145 meq/L    Potassium 4.3 3.5 - 5.2 meq/L    Chloride 97 (L) 98 - 111 meq/L    CO2 23 23 - 33 meq/L    Calcium 9.5 8.5 - 10.5 mg/dL    AST 22 5 - 40 U/L    Alkaline Phosphatase 73 38 - 126 U/L    Total Protein 7.8 6.1 - 8.0 g/dL    Albumin 4.8 3.5 - 5.1 g/dL    Total Bilirubin 0.3 0.3 - 1.2 mg/dL    ALT 35 11 - 66 U/L   Troponin   Result Value Ref Range    Troponin, High Sensitivity 16 (H) 0 - 12 ng/L   Brain Natriuretic Peptide   Result Value Ref Range    Pro-BNP 39.5 0.0 - 124.0 pg/mL   Procalcitonin   Result Value Ref Range    Procalcitonin 0.07 0.01 - 0.09 ng/mL   Blood Gas, Arterial   Result Value Ref Range    pH, Blood Gas 7.34 (L) 7.35 - 7.45    PCO2 45 35 - 45 mmhg    PO2 96 71 - 104 mmhg    HCO3 24 23 - 28 mmol/l    Base Excess (Calculated) -1.8 -2.5 - 2.5 mmol/l    O2 Sat 97 %    IFIO2 6     DEVICE Cannula     Leonel Test Positive     Source: R Radial     COLLECTED BY: [id number]    Anion Gap   Result Value Ref Range    Anion Gap 15.0 8.0 - 16.0 meq/L   Glomerular Filtration Rate, Estimated   Result Value Ref Range    Est, Glom Filt Rate >90 >60 ml/min/1.73m2   Osmolality   Result Value Ref Range    Osmolality Calc 273.4 (L) 275.0 - 300.0 mOsmol/kg   EKG 12 Lead   Result Value Ref Range    Ventricular Rate 111 BPM    Atrial Rate 111 BPM    P-R Interval 176 ms    QRS Duration 150 ms    Q-T Interval 384 ms    QTc Calculation (Bazett) 522 ms    P Axis 90 degrees    R Axis 86 degrees    T Axis 85 degrees       (Any cultures that may have been sent were not resulted at the time of this patient visit)    MEDICAL DECISION MAKING (MDM) AND ED COURSE     3:43 AM EDT: Patient was seen and evaluated.   DDx include but not limited to: Pneumonia, bronchitis, CHF, PE, COPD, asthma, pulmonary edema, pleural effusion, AMI, URI, influenza, sinusitis, allergies, anxiety  Initial plan: Large-bore IV, NS bolus, DuoNeb x 2, IV Solu-Medrol, labs, CTA chest    5:14 AM    Patient has acute hypoxemic respiratory failure  radiology tests and orders.    Shared Decision-Making: ED workups, treatment plan and dispositions are discussed with the patient/family, questions answered     FINAL IMPRESSION AND DISPOSITION/PLAN     1. Acute hypoxemic respiratory failure (HCC)    2. COPD exacerbation (HCC)        DISPOSITION Admitted 04/07/2024 05:45:02 AM      OUTPATIENT FOLLOW UP THE PATIENT:  No follow-up provider specified.    DISCHARGE MEDICATIONS:(None if blank)  New Prescriptions    No medications on file       MD Alonzo Felix, MD Servando  04/07/24 0602

## 2024-04-07 NOTE — ED TRIAGE NOTES
Patient presents to ED with c/o shortness of breath. Patient was 83% on room air. Placed on 6L O2 via nasal cannula and oxygen saturation now 94%. Patient states that he was recently treated for pneumonia. EKG completed in triage. Call light in reach.

## 2024-04-08 VITALS
OXYGEN SATURATION: 94 % | HEART RATE: 90 BPM | SYSTOLIC BLOOD PRESSURE: 121 MMHG | WEIGHT: 154.32 LBS | DIASTOLIC BLOOD PRESSURE: 61 MMHG | RESPIRATION RATE: 18 BRPM | BODY MASS INDEX: 24.22 KG/M2 | TEMPERATURE: 98.2 F | HEIGHT: 67 IN

## 2024-04-08 LAB
ANION GAP SERPL CALC-SCNC: 12 MEQ/L (ref 8–16)
BUN SERPL-MCNC: 23 MG/DL (ref 7–22)
CALCIUM SERPL-MCNC: 8.8 MG/DL (ref 8.5–10.5)
CHLORIDE SERPL-SCNC: 101 MEQ/L (ref 98–111)
CO2 SERPL-SCNC: 23 MEQ/L (ref 23–33)
CREAT SERPL-MCNC: 0.9 MG/DL (ref 0.4–1.2)
DEPRECATED RDW RBC AUTO: 49.7 FL (ref 35–45)
ERYTHROCYTE [DISTWIDTH] IN BLOOD BY AUTOMATED COUNT: 15.2 % (ref 11.5–14.5)
GFR SERPL CREATININE-BSD FRML MDRD: > 90 ML/MIN/1.73M2
GLUCOSE SERPL-MCNC: 141 MG/DL (ref 70–108)
HCT VFR BLD AUTO: 44.5 % (ref 42–52)
HGB BLD-MCNC: 14.4 GM/DL (ref 14–18)
MCH RBC QN AUTO: 29.1 PG (ref 26–33)
MCHC RBC AUTO-ENTMCNC: 32.4 GM/DL (ref 32.2–35.5)
MCV RBC AUTO: 89.9 FL (ref 80–94)
PLATELET # BLD AUTO: 253 THOU/MM3 (ref 130–400)
PMV BLD AUTO: 10 FL (ref 9.4–12.4)
POTASSIUM SERPL-SCNC: 4.3 MEQ/L (ref 3.5–5.2)
RBC # BLD AUTO: 4.95 MILL/MM3 (ref 4.7–6.1)
SODIUM SERPL-SCNC: 136 MEQ/L (ref 135–145)
WBC # BLD AUTO: 13.3 THOU/MM3 (ref 4.8–10.8)

## 2024-04-08 PROCEDURE — 2700000000 HC OXYGEN THERAPY PER DAY

## 2024-04-08 PROCEDURE — 36415 COLL VENOUS BLD VENIPUNCTURE: CPT

## 2024-04-08 PROCEDURE — 6370000000 HC RX 637 (ALT 250 FOR IP): Performed by: NURSE PRACTITIONER

## 2024-04-08 PROCEDURE — 99239 HOSP IP/OBS DSCHRG MGMT >30: CPT | Performed by: HOSPITALIST

## 2024-04-08 PROCEDURE — 6360000002 HC RX W HCPCS: Performed by: NURSE PRACTITIONER

## 2024-04-08 PROCEDURE — 85027 COMPLETE CBC AUTOMATED: CPT

## 2024-04-08 PROCEDURE — 94640 AIRWAY INHALATION TREATMENT: CPT

## 2024-04-08 PROCEDURE — 94761 N-INVAS EAR/PLS OXIMETRY MLT: CPT

## 2024-04-08 PROCEDURE — G0378 HOSPITAL OBSERVATION PER HR: HCPCS

## 2024-04-08 PROCEDURE — 96372 THER/PROPH/DIAG INJ SC/IM: CPT

## 2024-04-08 PROCEDURE — 2580000003 HC RX 258: Performed by: NURSE PRACTITIONER

## 2024-04-08 PROCEDURE — 6370000000 HC RX 637 (ALT 250 FOR IP)

## 2024-04-08 PROCEDURE — 80048 BASIC METABOLIC PNL TOTAL CA: CPT

## 2024-04-08 PROCEDURE — 96376 TX/PRO/DX INJ SAME DRUG ADON: CPT

## 2024-04-08 RX ORDER — AZITHROMYCIN 500 MG/1
500 TABLET, FILM COATED ORAL DAILY
Qty: 2 TABLET | Refills: 0 | Status: SHIPPED | OUTPATIENT
Start: 2024-04-08 | End: 2024-04-10

## 2024-04-08 RX ORDER — PREDNISONE 20 MG/1
40 TABLET ORAL DAILY
Qty: 6 TABLET | Refills: 0 | Status: SHIPPED | OUTPATIENT
Start: 2024-04-08 | End: 2024-04-11 | Stop reason: ALTCHOICE

## 2024-04-08 RX ADMIN — ENOXAPARIN SODIUM 40 MG: 100 INJECTION SUBCUTANEOUS at 08:37

## 2024-04-08 RX ADMIN — IPRATROPIUM BROMIDE AND ALBUTEROL SULFATE 1 DOSE: .5; 3 SOLUTION RESPIRATORY (INHALATION) at 11:25

## 2024-04-08 RX ADMIN — METHYLPREDNISOLONE SODIUM SUCCINATE 40 MG: 40 INJECTION INTRAMUSCULAR; INTRAVENOUS at 05:18

## 2024-04-08 RX ADMIN — PANTOPRAZOLE SODIUM 40 MG: 40 TABLET, DELAYED RELEASE ORAL at 05:18

## 2024-04-08 RX ADMIN — SODIUM CHLORIDE, PRESERVATIVE FREE 10 ML: 5 INJECTION INTRAVENOUS at 08:38

## 2024-04-08 RX ADMIN — IPRATROPIUM BROMIDE AND ALBUTEROL SULFATE 1 DOSE: .5; 3 SOLUTION RESPIRATORY (INHALATION) at 07:50

## 2024-04-08 RX ADMIN — LISINOPRIL 30 MG: 20 TABLET ORAL at 08:38

## 2024-04-08 RX ADMIN — GUAIFENESIN 600 MG: 600 TABLET, EXTENDED RELEASE ORAL at 08:38

## 2024-04-08 NOTE — PLAN OF CARE
Problem: Discharge Planning  Goal: Discharge to home or other facility with appropriate resources  Outcome: Progressing  Flowsheets (Taken 4/8/2024 0034)  Discharge to home or other facility with appropriate resources:   Identify barriers to discharge with patient and caregiver   Arrange for needed discharge resources and transportation as appropriate   Identify discharge learning needs (meds, wound care, etc)     Problem: Pain  Goal: Verbalizes/displays adequate comfort level or baseline comfort level  Outcome: Progressing  Flowsheets (Taken 4/8/2024 0034)  Verbalizes/displays adequate comfort level or baseline comfort level:   Encourage patient to monitor pain and request assistance   Administer analgesics based on type and severity of pain and evaluate response   Assess pain using appropriate pain scale     Problem: Respiratory - Adult  Goal: Achieves optimal ventilation and oxygenation  Outcome: Progressing  Flowsheets (Taken 4/8/2024 0034)  Achieves optimal ventilation and oxygenation:   Position to facilitate oxygenation and minimize respiratory effort   Assess for changes in respiratory status   Assess for changes in mentation and behavior     Problem: Infection - Adult  Goal: Absence of infection at discharge  Outcome: Progressing  Flowsheets (Taken 4/8/2024 0034)  Absence of infection at discharge:   Assess and monitor for signs and symptoms of infection   Monitor lab/diagnostic results   Monitor all insertion sites i.e., indwelling lines, tubes and drains     Care plan reviewed with patient.  Patient verbalizes understanding of the care plan and contributed to goal setting.

## 2024-04-08 NOTE — PROGRESS NOTES
A home oxygen evaluation has been completed.     [x]Patient is an inpatient. It is expected that the patient will be discharged within the next 48 hours.  Qualified provider to write orders for possible sleep study or home oxygen prescription.Social service/care managers will arrange for home oxygen if ordered.  If patient is active, arrange for Home Medical supplier to assess for Oxygen Conserving Device per pulse oximetry.  []Patient is an outpatient. Results will be faxed to the ordering provider. Qualified provider to write orders for possible sleep study or home oxygen prescription.    Patient was placed on room air for  3 hours. SpO2 was 94 % on room air at rest. Patients SpO2 was 89% or above and did not qualify for home oxygen. Patient was walked for 6 minutes. SpO2 was 92 % during walking. Patients SpO2 was 89% or above and did not qualify for home oxygen. Patient does not have a positive pressure airway device at home. Patient is not  diagnosed with Obstructive Sleep Apnea. Patient will not be set up/instructed on a nocturnal study. Results will be given to qualified provider.    Note: For any SpO2 at 89% see policy and procedure for possible qualifications.

## 2024-04-08 NOTE — CARE COORDINATION
Case Management Assessment Initial Evaluation    Date/Time of Evaluation: 2024 8:54 AM  Assessment Completed by: Chasity Velez RN    If patient is discharged prior to next notation, then this note serves as note for discharge by case management.    Patient Name: South Nettles                   YOB: 1960  Diagnosis: COPD exacerbation (HCC) [J44.1]  Acute respiratory failure with hypoxia (HCC) [J96.01]  Acute hypoxemic respiratory failure (HCC) [J96.01]                   Date / Time: 2024  3:34 AM  Location: Counts include [facility] beds at the [facility]     Patient Admission Status: Inpatient   Readmission Risk Low 0-14, Mod 15-19), High > 20: Readmission Risk Score: 6.9    Current PCP: Marsha Ireland MD    Additional Case Management Notes: Presented to ED with c/o shortness of breath and hypoxia. He had seen PCP on 3/27 for cough, wheezing, SOB, and fatigue. Rapid flu & COVID 19 were negative. Treated with ATB, steroids, and given nebs. Pt reports some improvement, however, over past 4-5 days had increased wheezing and SOB, productive cough with yellow sputum. Reports pleuritic chest pain w/deep inspiration and coughing. On arrival to ED sats were 83% on room air and placed on 6L O2. Admitted to 4K stepdown. Weaned down to 1L O2. Order to transfer to Med/Surg floor; awaiting bed assignment.     Afebrile. NSR. Sast 93% on 1L O2. Neuro WDL. Respiratory culture +HFlu and Klebsiella oxytoca. Telemetry, IS, n/v checks. IV rocephin, lovenox, mucinex, nebs, lisinopril, IV solumedrol 40 mg Q12H, protonix, Electrolyte replacement protocols. Trop 16, wbc 10.3 - now 13.3. Rapid flu & COVID 19 were negative. Blood cultures sent.     Procedures: none    Imagin/7 CXR: No acute disease   CTA Chest: No evidence for pulmonary embolus. Mild centrilobular emphysema. Right upper lobe scarring. Partially visualized upper abdomen: Fatty change of the liver. Multiple small gallstones within the gallbladder lumen. There is no CT  nebs thru [facility] so prefers to use them if needs O2.)

## 2024-04-08 NOTE — PLAN OF CARE
Problem: Discharge Planning  Goal: Discharge to home or other facility with appropriate resources  Outcome: Adequate for Discharge  Flowsheets (Taken 4/8/2024 0830)  Discharge to home or other facility with appropriate resources:   Identify barriers to discharge with patient and caregiver   Identify discharge learning needs (meds, wound care, etc)     Problem: Pain  Goal: Verbalizes/displays adequate comfort level or baseline comfort level  Outcome: Adequate for Discharge     Problem: Respiratory - Adult  Goal: Achieves optimal ventilation and oxygenation  Outcome: Adequate for Discharge  Flowsheets (Taken 4/8/2024 0830)  Achieves optimal ventilation and oxygenation:   Assess for changes in respiratory status   Assess for changes in mentation and behavior     Problem: Infection - Adult  Goal: Absence of infection at discharge  Outcome: Adequate for Discharge  Flowsheets (Taken 4/8/2024 0830)  Absence of infection at discharge:   Assess and monitor for signs and symptoms of infection   Monitor lab/diagnostic results

## 2024-04-08 NOTE — DISCHARGE INSTRUCTIONS
-Continue prednisone 40 mg daily x 3 days  -Continue azithromycin 500 mg x 2 days  -Follow-up with pulmonology clinic for PFTs and further evaluation of COPD, referral made  -Follow-up with PCP x 1 week  -Continue to use home albuterol inhaler as needed.

## 2024-04-09 ENCOUNTER — TELEPHONE (OUTPATIENT)
Dept: FAMILY MEDICINE CLINIC | Age: 64
End: 2024-04-09

## 2024-04-09 ENCOUNTER — CARE COORDINATION (OUTPATIENT)
Dept: CASE MANAGEMENT | Age: 64
End: 2024-04-09

## 2024-04-09 DIAGNOSIS — J18.9 COMMUNITY ACQUIRED PNEUMONIA, UNSPECIFIED LATERALITY: ICD-10-CM

## 2024-04-09 DIAGNOSIS — J96.01 ACUTE HYPOXEMIC RESPIRATORY FAILURE (HCC): Primary | ICD-10-CM

## 2024-04-09 LAB
BACTERIA SPEC RESP CULT: ABNORMAL
BACTERIA SPEC RESP CULT: ABNORMAL
GRAM STN SPEC: ABNORMAL
ORGANISM: ABNORMAL

## 2024-04-09 PROCEDURE — 1111F DSCHRG MED/CURRENT MED MERGE: CPT | Performed by: FAMILY MEDICINE

## 2024-04-09 RX ORDER — IPRATROPIUM BROMIDE AND ALBUTEROL SULFATE 2.5; .5 MG/3ML; MG/3ML
1 SOLUTION RESPIRATORY (INHALATION) EVERY 6 HOURS PRN
Qty: 360 ML | Refills: 0 | Status: SHIPPED | OUTPATIENT
Start: 2024-04-09

## 2024-04-09 NOTE — TELEPHONE ENCOUNTER
[facility] Transitions Initial Follow Up Call    Outreach made within 2 business days of discharge: Yes    Patient: South Nettles Patient : 1960   MRN: [id number]  Reason for Admission: There are no discharge diagnoses documented for the most recent discharge.  Discharge Date: 24       Spoke with: South    Discharge department/facility: [facility] Interactive Patient Contact:  Was patient able to fill all prescriptions: Yes  Was patient instructed to bring all medications to the follow-up visit: Yes  Is patient taking all medications as directed in the discharge summary? Yes  Does patient understand their discharge instructions: Yes  Does patient have questions or concerns that need addressed prior to 7-14 day follow up office visit: no    Scheduled appointment with PCP within 7-14 days    Follow Up  Future Appointments   Date Time Provider Department Center   2024 11:00 AM Danielle Andersen, APRN - CNP [facility] [place]   2024 10:40 AM Lali Madera MD [facility] [place]   2024 10:40 AM Marsha Ireland MD [facility] [place]       Stanislaw Lamas MA

## 2024-04-09 NOTE — TELEPHONE ENCOUNTER
Please clarify how much patient is using per day. Danielle gave him 120 doses of duoneb 13 days ago (@ 6 times a day max that's about 80 doses).

## 2024-04-09 NOTE — TELEPHONE ENCOUNTER
South Nettles called requesting a refill on the following medications:  Requested Prescriptions      No prescriptions requested or ordered in this encounter       Date of last visit: 3/27/2024  Date of next visit (if applicable):5/7/2024  Date of last refill: 3/24/24  Pharmacy Name: [facility],  Emma Kovacs MA

## 2024-04-09 NOTE — CARE COORDINATION
Care Transitions Initial Follow Up Call    Call within 2 business days of discharge: Yes    Patient Current Location:  Home: [address] [place] [id number]    Care Transition Nurse contacted the patient by telephone to perform post hospital discharge assessment. Verified name and  with patient as identifiers. Provided introduction to self, and explanation of the Care Transition Nurse role.     Patient: South Nettles Patient : 1960   MRN: [id number]  Reason for Admission: Acute hypoxemic resp failure  Discharge Date: 24 RARS: Readmission Risk Score: 7.5      Last Discharge Facility       Date Complaint Diagnosis Description Type Department Provider    24 Shortness of Breath Acute hypoxemic respiratory failure (HCC) ... ED to Hosp-Admission (Discharged) (ADMITTED) [facility] Brenda Sim MD; Servando Rosado MD            Challenges to be reviewed by the provider   Additional needs identified to be addressed with provider: No  none               Method of communication with provider: none.    Spoke with Jatinder, said he slept half way decent.  Is weak but knows it will take a few days to gain his strength.  Denies SOB but is using nebs d/t wheezing.  Denies chest pain, fever, chills, dizziness.  Reviewed medications/changes.  Appetite and fluid intake is good.  Will see PCP this week and pulmonary end of month.  No other issues to report.  Denies any other needs.  No other questions or concerns at this time.  Will continue to follow.    Care Transition Nurse reviewed discharge instructions, medical action plan, and red flags with patient who verbalized understanding. The patient was given an opportunity to ask questions and does not have any further questions or concerns at this time. Were discharge instructions available to patient? Yes. Reviewed appropriate site of care based on symptoms and resources available to patient including: PCP  Specialist  Urgent care clinics  When to call 911. The

## 2024-04-11 ENCOUNTER — OFFICE VISIT (OUTPATIENT)
Dept: FAMILY MEDICINE CLINIC | Age: 64
End: 2024-04-11

## 2024-04-11 VITALS
HEIGHT: 67 IN | SYSTOLIC BLOOD PRESSURE: 130 MMHG | TEMPERATURE: 97.6 F | WEIGHT: 157 LBS | RESPIRATION RATE: 18 BRPM | HEART RATE: 85 BPM | BODY MASS INDEX: 24.64 KG/M2 | DIASTOLIC BLOOD PRESSURE: 70 MMHG | OXYGEN SATURATION: 95 %

## 2024-04-11 DIAGNOSIS — J44.1 COPD EXACERBATION (HCC): ICD-10-CM

## 2024-04-11 DIAGNOSIS — G89.29 CHRONIC LOW BACK PAIN, UNSPECIFIED BACK PAIN LATERALITY, UNSPECIFIED WHETHER SCIATICA PRESENT: ICD-10-CM

## 2024-04-11 DIAGNOSIS — I10 PRIMARY HYPERTENSION: ICD-10-CM

## 2024-04-11 DIAGNOSIS — J96.01 ACUTE HYPOXEMIC RESPIRATORY FAILURE (HCC): ICD-10-CM

## 2024-04-11 DIAGNOSIS — Z72.0 TOBACCO ABUSE: ICD-10-CM

## 2024-04-11 DIAGNOSIS — K21.9 GASTROESOPHAGEAL REFLUX DISEASE, UNSPECIFIED WHETHER ESOPHAGITIS PRESENT: ICD-10-CM

## 2024-04-11 DIAGNOSIS — M54.50 CHRONIC LOW BACK PAIN, UNSPECIFIED BACK PAIN LATERALITY, UNSPECIFIED WHETHER SCIATICA PRESENT: ICD-10-CM

## 2024-04-11 DIAGNOSIS — Z09 HOSPITAL DISCHARGE FOLLOW-UP: Primary | ICD-10-CM

## 2024-04-11 LAB
EKG ATRIAL RATE: 111 BPM
EKG P AXIS: 90 DEGREES
EKG P-R INTERVAL: 176 MS
EKG Q-T INTERVAL: 384 MS
EKG QRS DURATION: 110 MS
EKG QTC CALCULATION (BAZETT): 522 MS
EKG R AXIS: 86 DEGREES
EKG T AXIS: 85 DEGREES
EKG VENTRICULAR RATE: 111 BPM

## 2024-04-11 NOTE — PROGRESS NOTES
Post-Discharge Transitional Care  Follow Up      South Nettles   YOB: 1960    Date of Office Visit:  4/11/2024  Date of Hospital Admission: 4/7/24  Date of Hospital Discharge: 4/8/24  Risk of hospital readmission (high >=14%. Medium >=10%) :Readmission Risk Score: 7.5      Care management risk score Rising risk (score 2-5) and Complex Care (Scores >=6): No Risk Score On File     Non face to face  following discharge, date last encounter closed (first attempt may have been earlier): 04/09/2024    Call initiated 2 business days of discharge: Yes    ASSESSMENT/PLAN:   Hospital discharge follow-up  -     [place] DISCHARGE MEDS RECONCILED W/ CURRENT OUTPATIENT MED LIST  COPD exacerbation (HCC)  Acute hypoxemic respiratory failure (HCC)  Tobacco abuse  Primary hypertension  Gastroesophageal reflux disease, unspecified whether esophagitis present  Chronic low back pain, unspecified back pain laterality, unspecified whether sciatica present    Medical Decision Making: moderate complexity  No follow-ups on file.         Keep scheduled follow up with pulmonology.  Will call if symptoms do not continue to improve.  Has not smoked in 3 weeks and will continue with cessation.  BP is controlled.  Advised of red flag symptoms and when to return to ER.    Subjective:   HPI:  Follow up of Hospital problems/diagnosis(es):   Discharge Diagnoses:  COPD exacerbation  No prior PFTs, CTA chest showing mild centrilobular emphysema and right upper lobe scarring  Discharged with prednisone 40 mg x 3 days  Discharged with azithromycin 500 mg x 2 days  Referral made to pulmonology clinic  Continue albuterol inhaler as needed  Acute hypoxic respiratory failure 2/2 above diagnosis  Initially requiring HFNC, wean to room air on day of discharge  Home O2 eval negative, does not require oxygen on discharge  Tobacco abuse  Patient states he quit smoking 3 weeks ago  Hypertension  GERD  Chronic back pain  History of PAD    Inpatient

## 2024-04-12 ENCOUNTER — CARE COORDINATION (OUTPATIENT)
Dept: CASE MANAGEMENT | Age: 64
End: 2024-04-12

## 2024-04-12 LAB
BACTERIA BLD AEROBE CULT: NORMAL
BACTERIA BLD AEROBE CULT: NORMAL

## 2024-04-12 NOTE — CARE COORDINATION
5/7/2024 10:40 AM Marsha Ireland MD Family Medicine [phone]            Care Transition Nurse provided contact information.  Plan for follow-up call in 2-5 days based on severity of symptoms and risk factors.  Plan for next call: symptom management-new or worsening symptoms      Sera Quinn RN

## 2024-04-17 ENCOUNTER — CARE COORDINATION (OUTPATIENT)
Dept: CASE MANAGEMENT | Age: 64
End: 2024-04-17

## 2024-04-17 NOTE — CARE COORDINATION
Care Transitions Follow Up Call    Patient Current Location:  [facility] Care Coordinator contacted the patient by telephone to follow up after admission on 2024.  Verified name and  with patient as identifiers.    Patient: South Nettles  Patient : 1960   MRN: <[id number]>  Reason for Admission: Acute hypoxemic resp failure   Discharge Date: 24 RARS: Readmission Risk Score: 7.5      Needs to be reviewed by the provider   Additional needs identified to be addressed with provider: No  none             Method of communication with provider: none.  Spoke to patient. He stated he is feeling better. Denies smoking, fever, chills, weakness, fatigue, ha, dizziness, cp or wheezing. He has sob and coughing at times. Phlegm is clear to light yellow. Appetite and fluid intake is good. No urinary or bowel elimination problems. No questions, needs or concerns voiced. Will continue to follow.      Addressed changes since last contact:  none  Discussed follow-up appointments. If no appointment was previously scheduled, appointment scheduling offered: na.   Is follow up appointment scheduled within 7 days of discharge? Yes.    Follow Up  Future Appointments   Date Time Provider Department Center   2024 10:40 AM Lali Madrea MD N Pulm Med [facility]   2024 10:40 AM Marsha Ireland MD [facility]     External follow up appointment(s):     [facility] Care Coordinator reviewed medical action plan and red flags with patient and discussed any barriers to care and/or understanding of plan of care after discharge. Discussed appropriate site of care based on symptoms and resources available to patient including: PCP  Specialist  Urgent care clinics  When to call 911. The patient agrees to contact the PCP office for questions related to their healthcare.     Advance Care Planning:   reviewed and current.     Patients top risk factors for readmission: ineffective coping and medical

## 2024-04-24 ENCOUNTER — HOSPITAL ENCOUNTER (EMERGENCY)
Age: 64
Discharge: HOME | DRG: 190 | End: 2024-04-24
Attending: INTERNAL MEDICINE
Payer: MEDICARE

## 2024-04-24 ENCOUNTER — APPOINTMENT (OUTPATIENT)
Dept: GENERAL RADIOLOGY | Age: 64
DRG: 190 | End: 2024-04-24
Payer: MEDICARE

## 2024-04-24 VITALS
WEIGHT: 155 LBS | SYSTOLIC BLOOD PRESSURE: 179 MMHG | BODY MASS INDEX: 24.33 KG/M2 | RESPIRATION RATE: 15 BRPM | TEMPERATURE: 98.3 F | HEIGHT: 67 IN | DIASTOLIC BLOOD PRESSURE: 107 MMHG | OXYGEN SATURATION: 96 % | HEART RATE: 92 BPM

## 2024-04-24 DIAGNOSIS — R06.09 DYSPNEA ON EXERTION: ICD-10-CM

## 2024-04-24 DIAGNOSIS — R09.02 HYPOXIA: ICD-10-CM

## 2024-04-24 DIAGNOSIS — J44.1 COPD EXACERBATION (HCC): Primary | ICD-10-CM

## 2024-04-24 LAB
ANION GAP SERPL CALC-SCNC: 16 MEQ/L (ref 8–16)
BASOPHILS ABSOLUTE: 0 THOU/MM3 (ref 0–0.1)
BASOPHILS NFR BLD AUTO: 0.5 %
BUN SERPL-MCNC: 16 MG/DL (ref 7–22)
CALCIUM SERPL-MCNC: 10.2 MG/DL (ref 8.5–10.5)
CHLORIDE SERPL-SCNC: 98 MEQ/L (ref 98–111)
CO2 SERPL-SCNC: 23 MEQ/L (ref 23–33)
CREAT SERPL-MCNC: 1.1 MG/DL (ref 0.4–1.2)
DEPRECATED RDW RBC AUTO: 48 FL (ref 35–45)
EKG ATRIAL RATE: 98 BPM
EKG P AXIS: 31 DEGREES
EKG P-R INTERVAL: 172 MS
EKG Q-T INTERVAL: 328 MS
EKG QRS DURATION: 90 MS
EKG QTC CALCULATION (BAZETT): 418 MS
EKG R AXIS: 78 DEGREES
EKG T AXIS: 53 DEGREES
EKG VENTRICULAR RATE: 98 BPM
EOSINOPHIL NFR BLD AUTO: 8.1 %
EOSINOPHILS ABSOLUTE: 0.5 THOU/MM3 (ref 0–0.4)
ERYTHROCYTE [DISTWIDTH] IN BLOOD BY AUTOMATED COUNT: 15.1 % (ref 11.5–14.5)
GFR SERPL CREATININE-BSD FRML MDRD: 74 ML/MIN/1.73M2
GLUCOSE SERPL-MCNC: 119 MG/DL (ref 70–108)
HCT VFR BLD AUTO: 47.9 % (ref 42–52)
HGB BLD-MCNC: 15.7 GM/DL (ref 14–18)
IMM GRANULOCYTES # BLD AUTO: 0.03 THOU/MM3 (ref 0–0.07)
IMM GRANULOCYTES NFR BLD AUTO: 0.5 %
LYMPHOCYTES ABSOLUTE: 1.5 THOU/MM3 (ref 1–4.8)
LYMPHOCYTES NFR BLD AUTO: 23.9 %
MCH RBC QN AUTO: 28.8 PG (ref 26–33)
MCHC RBC AUTO-ENTMCNC: 32.8 GM/DL (ref 32.2–35.5)
MCV RBC AUTO: 87.7 FL (ref 80–94)
MONOCYTES ABSOLUTE: 0.5 THOU/MM3 (ref 0.4–1.3)
MONOCYTES NFR BLD AUTO: 7.3 %
NEUTROPHILS NFR BLD AUTO: 59.7 %
NRBC BLD AUTO-RTO: 0 /100 WBC
NT-PROBNP SERPL IA-MCNC: 43.6 PG/ML (ref 0–124)
OSMOLALITY SERPL CALC.SUM OF ELEC: 276.1 MOSMOL/KG (ref 275–300)
PLATELET # BLD AUTO: 287 THOU/MM3 (ref 130–400)
PMV BLD AUTO: 9.4 FL (ref 9.4–12.4)
POTASSIUM SERPL-SCNC: 4.5 MEQ/L (ref 3.5–5.2)
RBC # BLD AUTO: 5.46 MILL/MM3 (ref 4.7–6.1)
SEGMENTED NEUTROPHILS ABSOLUTE COUNT: 3.8 THOU/MM3 (ref 1.8–7.7)
SODIUM SERPL-SCNC: 137 MEQ/L (ref 135–145)
TROPONIN, HIGH SENSITIVITY: 15 NG/L (ref 0–12)
WBC # BLD AUTO: 6.4 THOU/MM3 (ref 4.8–10.8)

## 2024-04-24 PROCEDURE — 99285 EMERGENCY DEPT VISIT HI MDM: CPT

## 2024-04-24 PROCEDURE — 87040 BLOOD CULTURE FOR BACTERIA: CPT

## 2024-04-24 PROCEDURE — 80048 BASIC METABOLIC PNL TOTAL CA: CPT

## 2024-04-24 PROCEDURE — 6370000000 HC RX 637 (ALT 250 FOR IP): Performed by: INTERNAL MEDICINE

## 2024-04-24 PROCEDURE — 87801 DETECT AGNT MULT DNA AMPLI: CPT

## 2024-04-24 PROCEDURE — 84484 ASSAY OF TROPONIN QUANT: CPT

## 2024-04-24 PROCEDURE — 36415 COLL VENOUS BLD VENIPUNCTURE: CPT

## 2024-04-24 PROCEDURE — 83880 ASSAY OF NATRIURETIC PEPTIDE: CPT

## 2024-04-24 PROCEDURE — 93010 ELECTROCARDIOGRAM REPORT: CPT | Performed by: NUCLEAR MEDICINE

## 2024-04-24 PROCEDURE — 93005 ELECTROCARDIOGRAM TRACING: CPT | Performed by: INTERNAL MEDICINE

## 2024-04-24 PROCEDURE — 71046 X-RAY EXAM CHEST 2 VIEWS: CPT

## 2024-04-24 PROCEDURE — 94761 N-INVAS EAR/PLS OXIMETRY MLT: CPT

## 2024-04-24 PROCEDURE — 85025 COMPLETE CBC W/AUTO DIFF WBC: CPT

## 2024-04-24 PROCEDURE — 87147 CULTURE TYPE IMMUNOLOGIC: CPT

## 2024-04-24 PROCEDURE — 94640 AIRWAY INHALATION TREATMENT: CPT

## 2024-04-24 RX ORDER — IPRATROPIUM BROMIDE AND ALBUTEROL SULFATE 2.5; .5 MG/3ML; MG/3ML
1 SOLUTION RESPIRATORY (INHALATION) ONCE
Status: COMPLETED | OUTPATIENT
Start: 2024-04-24 | End: 2024-04-24

## 2024-04-24 RX ORDER — FAMOTIDINE 20 MG/1
20 TABLET, FILM COATED ORAL 2 TIMES DAILY
Qty: 60 TABLET | Refills: 0 | Status: SHIPPED | OUTPATIENT
Start: 2024-04-24 | End: 2024-04-25

## 2024-04-24 RX ORDER — PANTOPRAZOLE SODIUM 40 MG/1
40 TABLET, DELAYED RELEASE ORAL ONCE
Status: COMPLETED | OUTPATIENT
Start: 2024-04-24 | End: 2024-04-24

## 2024-04-24 RX ORDER — FAMOTIDINE 20 MG/1
20 TABLET, FILM COATED ORAL ONCE
Status: COMPLETED | OUTPATIENT
Start: 2024-04-24 | End: 2024-04-24

## 2024-04-24 RX ORDER — ESOMEPRAZOLE MAGNESIUM 40 MG/1
40 CAPSULE, DELAYED RELEASE ORAL NIGHTLY
Qty: 90 CAPSULE | Refills: 0 | Status: SHIPPED | OUTPATIENT
Start: 2024-04-24 | End: 2024-04-25

## 2024-04-24 RX ADMIN — PANTOPRAZOLE SODIUM 40 MG: 40 TABLET, DELAYED RELEASE ORAL at 11:55

## 2024-04-24 RX ADMIN — FAMOTIDINE 20 MG: 20 TABLET, FILM COATED ORAL at 11:55

## 2024-04-24 RX ADMIN — IPRATROPIUM BROMIDE AND ALBUTEROL SULFATE 1 DOSE: .5; 3 SOLUTION RESPIRATORY (INHALATION) at 12:42

## 2024-04-24 ASSESSMENT — PAIN DESCRIPTION - PAIN TYPE: TYPE: ACUTE PAIN

## 2024-04-24 ASSESSMENT — PAIN DESCRIPTION - LOCATION: LOCATION: CHEST

## 2024-04-24 ASSESSMENT — PAIN SCALES - GENERAL: PAINLEVEL_OUTOF10: 3

## 2024-04-24 ASSESSMENT — PAIN - FUNCTIONAL ASSESSMENT: PAIN_FUNCTIONAL_ASSESSMENT: 0-10

## 2024-04-24 ASSESSMENT — PAIN DESCRIPTION - DESCRIPTORS: DESCRIPTORS: TIGHTNESS

## 2024-04-24 NOTE — ED PROVIDER NOTES
eMERGENCY dEPARTMENT eNCOUnter      CHIEF COMPLAINT    Chief Complaint   Patient presents with    Shortness of Breath    COPD    Wheezing       HPI    South Nettles is a 64 y.o. male who presents to the emergency department because of shortness of breath, nocturnal cough and orthopnea.  Patient is not complaining of any fever or chills.  Patient was recently admitted to the hospital for COPD about a week ago and was discharged home.  Patient was also put on steroids for his treatment.  Patient is not complaining of any chest pain or chest pressure.  There is no nausea, vomiting, dizziness, sweating or palpitations associated with the symptoms.  Patient stopped smoking about 5 weeks ago.    PAST MEDICAL HISTORY    Past Medical History:   Diagnosis Date    Bilateral inguinal hernia 12/14/2011    Chronic back pain     COPD (chronic obstructive pulmonary disease) (HCC)     Cough     patient is a  has a chronic cough    Essential hypertension 04/01/2016    GERD (gastroesophageal reflux disease)     Osteoarthritis     Recurrent left inguinal hernia     Stomach ulcer        SURGICAL HISTORY    Past Surgical History:   Procedure Laterality Date    ARM SURGERY Left 03/2020    [facility]-Dr. Peña    COLONOSCOPY  2019    [facility]    EGD  2019    [facility]    HERNIA REPAIR      child    HERNIA REPAIR  01/03/2012    [place]-left indirect reducible inguinal hernia with mesh    HERNIA REPAIR Bilateral 04/28/2020    ROBOTIC LEFT and Right INGUINAL REPAIR WITH MESH performed by Jake León MD at [facility] OR    HERNIA REPAIR Left 03/23/2021    RECURRENT LEFT INGUINAL HERNIA, REPAIR WITH MESH performed by Jake León MD at [facility] OR    ROTATOR CUFF REPAIR  2005 x2    left-Dr. Turpin    TOOTH EXTRACTION  1997    TOTAL HIP ARTHROPLASTY  01/23/2024    VASECTOMY  2005    WRIST SURGERY  12/27/2018    left wirst        CURRENT MEDICATIONS    Current Outpatient Rx   Medication Sig Dispense Refill    ipratropium 0.5 mg-albuterol  evidence of an acute process.               **This report has been created using voice recognition software. It may contain minor errors which are inherent in voice recognition technology.**      Final report electronically signed by Dr. Sveta Funes on 4/24/2024 12:09 PM           PROCEDURES    Patient was evaluated today for the diagnosis of COPD.  I entered a DME order for home oxygen at 2 lpm because the diagnosis and testing require the patient to have supplemental oxygen.  Condition will improve or be benefited by oxygen use.  The patient IS able to perform good mobility in a home setting and therefore does require the use of a portable oxygen system.  The need for this equipment was discussed with the patient and he understands and is in agreement.     ED COURSE & MEDICAL DECISION MAKING    Pertinent Labs & Imaging studies reviewed. (See chart for details)  Patient was given a breathing treatment in the emergency department.  Patient's lab, chest x-ray was reviewed.  Patient was ambulated in the emergency department on room air and patient desaturated.  Oxygen was arranged for the home and patient will be discharged home to follow-up with his primary care physician tomorrow morning.  Patient is also advised to come back to the emergency department if the symptoms get worse    FINAL IMPRESSION    1. COPD exacerbation (HCC)             Albert Skinner MD  04/24/24 5032

## 2024-04-24 NOTE — ED NOTES
ED nurse-to-nurse bedside report    Chief Complaint   Patient presents with    Shortness of Breath    COPD    Wheezing      LOC: alert and orientated to name, place, date  Vital signs   Vitals:    04/24/24 1550 04/24/24 1554 04/24/24 1622 04/24/24 1637   BP: (!) 166/107   (!) 179/107   Pulse: 87 90 87 86   Resp: 22 20 21 15   Temp:       SpO2: 96% 97% 92% 91%   Weight:       Height:          Pain:    Pain Interventions: fátima  Pain Goal: 0  Oxygen: Yes    Current needs required 1-4 LNC   Telemetry: Yes  LDAs:   Peripheral IV 04/24/24 Left Antecubital (Active)   Site Assessment Clean, dry & intact 04/24/24 1142   Line Status Blood return noted;Normal saline locked;Specimen collected 04/24/24 1142     Continuous Infusions:   Mobility: Independent  Valdez Fall Risk Score:       10/5/2023    10:39 AM 6/1/2023    10:28 AM   Fall Risk   2 or more falls in past year? yes no   Fall with injury in past year? no no     Fall Interventions: call light   Report given to: Sonido RAMON

## 2024-04-24 NOTE — CARE COORDINATION
Spoke with ERIC Mathews who complete updated home O2 evaluation. Patient preference for home O2 remains [facility] DME, spoke with Aditya MORTON who will have home O2 setup with patient in ED.   This information was presented to Dr. Skinner.

## 2024-04-24 NOTE — PROGRESS NOTES
A home oxygen evaluation has been completed.     [x]Patient is an inpatient. It is expected that the patient will be discharged within the next 48 hours. Qualified provider to write order for home prescription if patient qualifies. Social service/care managers will arrange for home oxygen.  If patient is active, arrange for Home Medical supplier to assess for Oxygen Conserving Device per pulse oximetry.  []Patient is an outpatient. Results will be faxed to the ordering provider. Qualified provider to write order for home prescription if patient qualifies and arranges for home oxygen.    Patient was placed on room air for 10 minutes. SpO2 was 91 % on room air at rest. Patients SpO2 was 89% or above and did not qualify for home oxygen. Patient was walked for 2 minutes. SpO2 was 88 % during walking. Patients SpO2 was below 89% and qualified for home oxygen. Oxygen was applied at 3 lpm via nasal cannula to maintain a SpO2 between 90-92% while walking. Actual SpO2 was 93 %.        Note: For any SpO2 at 89% see policy and procedure for possible qualifications.

## 2024-04-24 NOTE — ED TRIAGE NOTES
Patient presents to ER with complaints of shortness of breath and wheezing that started today. Patient reports hx of COPD. Audible expiratory wheezing noted. EKG obtained. Telemetry applied. Last breathing tx at 0300.

## 2024-04-24 NOTE — ED NOTES
Pt up at bedside to use urinal, when returning to bed states he is having trouble catching is breath

## 2024-04-25 ENCOUNTER — APPOINTMENT (OUTPATIENT)
Dept: GENERAL RADIOLOGY | Age: 64
DRG: 190 | End: 2024-04-25
Payer: MEDICARE

## 2024-04-25 ENCOUNTER — HOSPITAL ENCOUNTER (INPATIENT)
Age: 64
LOS: 3 days | Discharge: HOME | DRG: 190 | End: 2024-04-28
Attending: EMERGENCY MEDICINE
Payer: MEDICARE

## 2024-04-25 ENCOUNTER — APPOINTMENT (OUTPATIENT)
Dept: CT IMAGING | Age: 64
DRG: 190 | End: 2024-04-25
Payer: MEDICARE

## 2024-04-25 DIAGNOSIS — R09.02 HYPOXIA: ICD-10-CM

## 2024-04-25 DIAGNOSIS — J44.1 COPD EXACERBATION (HCC): Primary | ICD-10-CM

## 2024-04-25 DIAGNOSIS — R78.81 BACTEREMIA: ICD-10-CM

## 2024-04-25 DIAGNOSIS — R79.89 ELEVATED TROPONIN: ICD-10-CM

## 2024-04-25 PROBLEM — J96.01 ACUTE RESPIRATORY FAILURE WITH HYPOXIA (HCC): Status: ACTIVE | Noted: 2024-04-25

## 2024-04-25 LAB
ACB COMPLEX DNA BLD POS QL NAA+NON-PROBE: NOT DETECTED
ANION GAP SERPL CALC-SCNC: 14 MEQ/L (ref 8–16)
B FRAGILIS DNA BLD POS QL NAA+NON-PROBE: NOT DETECTED
BASOPHILS ABSOLUTE: 0.1 THOU/MM3 (ref 0–0.1)
BASOPHILS NFR BLD AUTO: 0.7 %
BILIRUB UR QL STRIP.AUTO: NEGATIVE
BLACTX-M ISLT/SPM QL: ABNORMAL
BLAIMP ISLT/SPM QL: ABNORMAL
BLAKPC ISLT/SPM QL: ABNORMAL
BLAOXA-48-LIKE ISLT/SPM QL: ABNORMAL
BLAVIM ISLT/SPM QL: ABNORMAL
BOTTLE TYPE: ABNORMAL
BUN SERPL-MCNC: 16 MG/DL (ref 7–22)
C ALBICANS DNA BLD POS QL NAA+NON-PROBE: NOT DETECTED
C AURIS DNA BLD POS QL NAA+NON-PROBE: NOT DETECTED
C GATTII+NEOFOR DNA BLD POS QL NAA+N-PRB: NOT DETECTED
C GLABRATA DNA BLD POS QL NAA+NON-PROBE: NOT DETECTED
C KRUSEI DNA BLD POS QL NAA+NON-PROBE: NOT DETECTED
C PARAP DNA BLD POS QL NAA+NON-PROBE: NOT DETECTED
C TROPICLS DNA BLD POS QL NAA+NON-PROBE: NOT DETECTED
CALCIUM SERPL-MCNC: 9.8 MG/DL (ref 8.5–10.5)
CHARACTER UR: CLEAR
CHLORIDE SERPL-SCNC: 98 MEQ/L (ref 98–111)
CO2 SERPL-SCNC: 22 MEQ/L (ref 23–33)
COAG NEG STAPH DNA BLD QL NAA+PROBE: DETECTED
COLISTIN RES MCR-1 ISLT/SPM QL: ABNORMAL
COLOR: YELLOW
CREAT SERPL-MCNC: 1 MG/DL (ref 0.4–1.2)
DEPRECATED RDW RBC AUTO: 47.8 FL (ref 35–45)
E CLOAC COMP DNA BLD POS NAA+NON-PROBE: NOT DETECTED
E COLI DNA BLD POS QL NAA+NON-PROBE: NOT DETECTED
E FAECALIS DNA BLD POS QL NAA+NON-PROBE: NOT DETECTED
E FAECIUM DNA BLD POS QL NAA+NON-PROBE: NOT DETECTED
EKG ATRIAL RATE: 95 BPM
EKG P AXIS: 52 DEGREES
EKG P-R INTERVAL: 172 MS
EKG Q-T INTERVAL: 348 MS
EKG QRS DURATION: 92 MS
EKG QTC CALCULATION (BAZETT): 437 MS
EKG R AXIS: 80 DEGREES
EKG T AXIS: 61 DEGREES
EKG VENTRICULAR RATE: 95 BPM
ENTEROBACTERALES DNA BLD POS NAA+N-PRB: NOT DETECTED
EOSINOPHIL NFR BLD AUTO: 6 %
EOSINOPHILS ABSOLUTE: 0.4 THOU/MM3 (ref 0–0.4)
ERYTHROCYTE [DISTWIDTH] IN BLOOD BY AUTOMATED COUNT: 15 % (ref 11.5–14.5)
FLUAV RNA RESP QL NAA+PROBE: NOT DETECTED
FLUBV RNA RESP QL NAA+PROBE: NOT DETECTED
GFR SERPL CREATININE-BSD FRML MDRD: 83 ML/MIN/1.73M2
GLUCOSE SERPL-MCNC: 123 MG/DL (ref 70–108)
GLUCOSE UR QL STRIP.AUTO: NEGATIVE MG/DL
GP B STREP DNA SPEC QL NAA+PROBE: NOT DETECTED
GP B STREP DNA SPEC QL NAA+PROBE: NOT DETECTED
HAEM INFLU DNA BLD POS QL NAA+NON-PROBE: NOT DETECTED
HCT VFR BLD AUTO: 46.9 % (ref 42–52)
HGB BLD-MCNC: 15.6 GM/DL (ref 14–18)
HGB UR QL STRIP.AUTO: NEGATIVE
IMM GRANULOCYTES # BLD AUTO: 0.04 THOU/MM3 (ref 0–0.07)
IMM GRANULOCYTES NFR BLD AUTO: 0.5 %
K OXYTOCA DNA BLD POS QL NAA+NON-PROBE: NOT DETECTED
K OXYTOCA DNA BLD POS QL NAA+NON-PROBE: NOT DETECTED
KETONES UR QL STRIP.AUTO: ABNORMAL
KLEBSIELLA SP DNA BLD POS QL NAA+NON-PRB: NOT DETECTED
L MONOCYTOG DNA BLD POS QL NAA+NON-PROBE: NOT DETECTED
LACTIC ACID, SEPSIS: 1.3 MMOL/L (ref 0.5–1.9)
LYMPHOCYTES ABSOLUTE: 1.1 THOU/MM3 (ref 1–4.8)
LYMPHOCYTES NFR BLD AUTO: 15.2 %
MAGNESIUM SERPL-MCNC: 2 MG/DL (ref 1.6–2.4)
MCH RBC QN AUTO: 28.9 PG (ref 26–33)
MCHC RBC AUTO-ENTMCNC: 33.3 GM/DL (ref 32.2–35.5)
MCV RBC AUTO: 87 FL (ref 80–94)
MECA ISLT/SPM QL: ABNORMAL
MECA+MECC+MREJ ISLT/SPM QL: ABNORMAL
MONOCYTES ABSOLUTE: 0.5 THOU/MM3 (ref 0.4–1.3)
MONOCYTES NFR BLD AUTO: 6.8 %
N MEN DNA BLD POS QL NAA+NON-PROBE: NOT DETECTED
NDM: ABNORMAL
NEUTROPHILS NFR BLD AUTO: 70.8 %
NITRITE UR QL STRIP: NEGATIVE
NRBC BLD AUTO-RTO: 0 /100 WBC
NT-PROBNP SERPL IA-MCNC: 97.8 PG/ML (ref 0–124)
OSMOLALITY SERPL CALC.SUM OF ELEC: 270.8 MOSMOL/KG (ref 275–300)
P AERUGINOSA DNA BLD POS NAA+NON-PROBE: NOT DETECTED
PH UR STRIP.AUTO: 7 [PH] (ref 5–9)
PLATELET # BLD AUTO: 282 THOU/MM3 (ref 130–400)
PMV BLD AUTO: 9.6 FL (ref 9.4–12.4)
POTASSIUM SERPL-SCNC: 4.6 MEQ/L (ref 3.5–5.2)
PROT UR STRIP.AUTO-MCNC: NEGATIVE MG/DL
PROTEUS SPP: NOT DETECTED
RBC # BLD AUTO: 5.39 MILL/MM3 (ref 4.7–6.1)
S AUREUS DNA BLD POS QL NAA+NON-PROBE: NOT DETECTED
S EPIDERMIDIS DNA BLD POS QL NAA+NON-PRB: NOT DETECTED
S LUGDUNENSIS DNA BLD POS QL NAA+NON-PRB: NOT DETECTED
S MALTOPHILIA DNA BLD POS QL NAA+NON-PRB: NOT DETECTED
S MARCESCENS DNA BLD POS NAA+NON-PROBE: NOT DETECTED
S PYO DNA THROAT QL NAA+PROBE: NOT DETECTED
SALMONELLA DNA BLD POS QL NAA+NON-PROBE: NOT DETECTED
SARS-COV-2 RNA RESP QL NAA+PROBE: NOT DETECTED
SEGMENTED NEUTROPHILS ABSOLUTE COUNT: 5.2 THOU/MM3 (ref 1.8–7.7)
SODIUM SERPL-SCNC: 134 MEQ/L (ref 135–145)
SOURCE OF BLOOD CULTURE: ABNORMAL
SP GR UR REFRACT.AUTO: > 1.03 (ref 1–1.03)
STREPTOCOCCUS DNA BLD QL NAA+PROBE: NOT DETECTED
TROPONIN, HIGH SENSITIVITY: 17 NG/L (ref 0–12)
TROPONIN, HIGH SENSITIVITY: 22 NG/L (ref 0–12)
UROBILINOGEN, URINE: 0.2 EU/DL (ref 0–1)
VANA+VANB ISLT/SPM QL: ABNORMAL
WBC # BLD AUTO: 7.4 THOU/MM3 (ref 4.8–10.8)
WBC #/AREA URNS HPF: NEGATIVE /[HPF]

## 2024-04-25 PROCEDURE — 2700000000 HC OXYGEN THERAPY PER DAY

## 2024-04-25 PROCEDURE — 6360000004 HC RX CONTRAST MEDICATION: Performed by: EMERGENCY MEDICINE

## 2024-04-25 PROCEDURE — 99285 EMERGENCY DEPT VISIT HI MDM: CPT

## 2024-04-25 PROCEDURE — 99223 1ST HOSP IP/OBS HIGH 75: CPT | Performed by: PHYSICIAN ASSISTANT

## 2024-04-25 PROCEDURE — 6370000000 HC RX 637 (ALT 250 FOR IP): Performed by: EMERGENCY MEDICINE

## 2024-04-25 PROCEDURE — 2580000003 HC RX 258: Performed by: PHYSICIAN ASSISTANT

## 2024-04-25 PROCEDURE — 6360000002 HC RX W HCPCS: Performed by: PHYSICIAN ASSISTANT

## 2024-04-25 PROCEDURE — 93010 ELECTROCARDIOGRAM REPORT: CPT | Performed by: NUCLEAR MEDICINE

## 2024-04-25 PROCEDURE — 81003 URINALYSIS AUTO W/O SCOPE: CPT

## 2024-04-25 PROCEDURE — 83880 ASSAY OF NATRIURETIC PEPTIDE: CPT

## 2024-04-25 PROCEDURE — 71275 CT ANGIOGRAPHY CHEST: CPT

## 2024-04-25 PROCEDURE — 6370000000 HC RX 637 (ALT 250 FOR IP): Performed by: PHYSICIAN ASSISTANT

## 2024-04-25 PROCEDURE — 80048 BASIC METABOLIC PNL TOTAL CA: CPT

## 2024-04-25 PROCEDURE — 87636 SARSCOV2 & INF A&B AMP PRB: CPT

## 2024-04-25 PROCEDURE — 1200000003 HC TELEMETRY R&B

## 2024-04-25 PROCEDURE — 96374 THER/PROPH/DIAG INJ IV PUSH: CPT

## 2024-04-25 PROCEDURE — 83605 ASSAY OF LACTIC ACID: CPT

## 2024-04-25 PROCEDURE — 85025 COMPLETE CBC W/AUTO DIFF WBC: CPT

## 2024-04-25 PROCEDURE — 93005 ELECTROCARDIOGRAM TRACING: CPT | Performed by: EMERGENCY MEDICINE

## 2024-04-25 PROCEDURE — 94761 N-INVAS EAR/PLS OXIMETRY MLT: CPT

## 2024-04-25 PROCEDURE — 94640 AIRWAY INHALATION TREATMENT: CPT

## 2024-04-25 PROCEDURE — 84484 ASSAY OF TROPONIN QUANT: CPT

## 2024-04-25 PROCEDURE — 6360000002 HC RX W HCPCS: Performed by: EMERGENCY MEDICINE

## 2024-04-25 PROCEDURE — 36415 COLL VENOUS BLD VENIPUNCTURE: CPT

## 2024-04-25 PROCEDURE — 71046 X-RAY EXAM CHEST 2 VIEWS: CPT

## 2024-04-25 PROCEDURE — 87040 BLOOD CULTURE FOR BACTERIA: CPT

## 2024-04-25 PROCEDURE — 83735 ASSAY OF MAGNESIUM: CPT

## 2024-04-25 PROCEDURE — 2580000003 HC RX 258: Performed by: EMERGENCY MEDICINE

## 2024-04-25 RX ORDER — SODIUM CHLORIDE 0.9 % (FLUSH) 0.9 %
5-40 SYRINGE (ML) INJECTION EVERY 12 HOURS SCHEDULED
Status: DISCONTINUED | OUTPATIENT
Start: 2024-04-25 | End: 2024-04-28 | Stop reason: HOSPADM

## 2024-04-25 RX ORDER — ACETAMINOPHEN 500 MG
1000 TABLET ORAL
Status: COMPLETED | OUTPATIENT
Start: 2024-04-25 | End: 2024-04-25

## 2024-04-25 RX ORDER — SODIUM CHLORIDE 0.9 % (FLUSH) 0.9 %
5-40 SYRINGE (ML) INJECTION PRN
Status: DISCONTINUED | OUTPATIENT
Start: 2024-04-25 | End: 2024-04-28 | Stop reason: HOSPADM

## 2024-04-25 RX ORDER — IPRATROPIUM BROMIDE AND ALBUTEROL SULFATE 2.5; .5 MG/3ML; MG/3ML
1 SOLUTION RESPIRATORY (INHALATION)
Status: DISCONTINUED | OUTPATIENT
Start: 2024-04-25 | End: 2024-04-28 | Stop reason: HOSPADM

## 2024-04-25 RX ORDER — SODIUM CHLORIDE 9 MG/ML
INJECTION, SOLUTION INTRAVENOUS PRN
Status: DISCONTINUED | OUTPATIENT
Start: 2024-04-25 | End: 2024-04-28 | Stop reason: HOSPADM

## 2024-04-25 RX ORDER — POLYETHYLENE GLYCOL 3350 17 G/17G
17 POWDER, FOR SOLUTION ORAL DAILY PRN
Status: DISCONTINUED | OUTPATIENT
Start: 2024-04-25 | End: 2024-04-28 | Stop reason: HOSPADM

## 2024-04-25 RX ORDER — ONDANSETRON 4 MG/1
4 TABLET, ORALLY DISINTEGRATING ORAL EVERY 8 HOURS PRN
Status: DISCONTINUED | OUTPATIENT
Start: 2024-04-25 | End: 2024-04-28 | Stop reason: HOSPADM

## 2024-04-25 RX ORDER — AMLODIPINE BESYLATE 5 MG/1
5 TABLET ORAL DAILY
Status: DISCONTINUED | OUTPATIENT
Start: 2024-04-25 | End: 2024-04-28 | Stop reason: HOSPADM

## 2024-04-25 RX ORDER — IBUPROFEN 200 MG
600 TABLET ORAL EVERY 8 HOURS PRN
COMMUNITY
End: 2024-04-29

## 2024-04-25 RX ORDER — POTASSIUM CHLORIDE 7.45 MG/ML
10 INJECTION INTRAVENOUS PRN
Status: DISCONTINUED | OUTPATIENT
Start: 2024-04-25 | End: 2024-04-28 | Stop reason: HOSPADM

## 2024-04-25 RX ORDER — IPRATROPIUM BROMIDE AND ALBUTEROL SULFATE 2.5; .5 MG/3ML; MG/3ML
1 SOLUTION RESPIRATORY (INHALATION) ONCE
Status: COMPLETED | OUTPATIENT
Start: 2024-04-25 | End: 2024-04-25

## 2024-04-25 RX ORDER — IPRATROPIUM BROMIDE AND ALBUTEROL SULFATE 2.5; .5 MG/3ML; MG/3ML
1 SOLUTION RESPIRATORY (INHALATION) EVERY 4 HOURS PRN
Status: DISCONTINUED | OUTPATIENT
Start: 2024-04-25 | End: 2024-04-28 | Stop reason: HOSPADM

## 2024-04-25 RX ORDER — ONDANSETRON 2 MG/ML
4 INJECTION INTRAMUSCULAR; INTRAVENOUS EVERY 6 HOURS PRN
Status: DISCONTINUED | OUTPATIENT
Start: 2024-04-25 | End: 2024-04-28 | Stop reason: HOSPADM

## 2024-04-25 RX ORDER — PREDNISONE 20 MG/1
40 TABLET ORAL ONCE
Status: COMPLETED | OUTPATIENT
Start: 2024-04-25 | End: 2024-04-25

## 2024-04-25 RX ORDER — MELOXICAM 7.5 MG/1
15 TABLET ORAL DAILY
Status: DISCONTINUED | OUTPATIENT
Start: 2024-04-26 | End: 2024-04-28 | Stop reason: HOSPADM

## 2024-04-25 RX ORDER — 0.9 % SODIUM CHLORIDE 0.9 %
1000 INTRAVENOUS SOLUTION INTRAVENOUS ONCE
Status: COMPLETED | OUTPATIENT
Start: 2024-04-25 | End: 2024-04-25

## 2024-04-25 RX ORDER — PREDNISONE 20 MG/1
40 TABLET ORAL DAILY
Status: DISCONTINUED | OUTPATIENT
Start: 2024-04-26 | End: 2024-04-28 | Stop reason: HOSPADM

## 2024-04-25 RX ORDER — ENOXAPARIN SODIUM 100 MG/ML
40 INJECTION SUBCUTANEOUS EVERY 24 HOURS
Status: DISCONTINUED | OUTPATIENT
Start: 2024-04-25 | End: 2024-04-28 | Stop reason: HOSPADM

## 2024-04-25 RX ORDER — IPRATROPIUM BROMIDE AND ALBUTEROL SULFATE 2.5; .5 MG/3ML; MG/3ML
1 SOLUTION RESPIRATORY (INHALATION)
Status: COMPLETED | OUTPATIENT
Start: 2024-04-25 | End: 2024-04-25

## 2024-04-25 RX ORDER — POTASSIUM CHLORIDE 20 MEQ/1
40 TABLET, EXTENDED RELEASE ORAL PRN
Status: DISCONTINUED | OUTPATIENT
Start: 2024-04-25 | End: 2024-04-28 | Stop reason: HOSPADM

## 2024-04-25 RX ORDER — OMEPRAZOLE 10 MG/1
10 CAPSULE, DELAYED RELEASE ORAL DAILY
Status: ON HOLD | COMMUNITY
End: 2024-04-28 | Stop reason: HOSPADM

## 2024-04-25 RX ORDER — ACETAMINOPHEN 325 MG/1
650 TABLET ORAL EVERY 6 HOURS PRN
Status: DISCONTINUED | OUTPATIENT
Start: 2024-04-25 | End: 2024-04-28 | Stop reason: HOSPADM

## 2024-04-25 RX ORDER — MAGNESIUM SULFATE IN WATER 40 MG/ML
2000 INJECTION, SOLUTION INTRAVENOUS PRN
Status: DISCONTINUED | OUTPATIENT
Start: 2024-04-25 | End: 2024-04-28 | Stop reason: HOSPADM

## 2024-04-25 RX ORDER — ACETAMINOPHEN 650 MG/1
650 SUPPOSITORY RECTAL EVERY 6 HOURS PRN
Status: DISCONTINUED | OUTPATIENT
Start: 2024-04-25 | End: 2024-04-28 | Stop reason: HOSPADM

## 2024-04-25 RX ADMIN — IPRATROPIUM BROMIDE AND ALBUTEROL SULFATE 1 DOSE: .5; 3 SOLUTION RESPIRATORY (INHALATION) at 12:27

## 2024-04-25 RX ADMIN — AZITHROMYCIN MONOHYDRATE 500 MG: 500 INJECTION, POWDER, LYOPHILIZED, FOR SOLUTION INTRAVENOUS at 18:14

## 2024-04-25 RX ADMIN — ACETAMINOPHEN 650 MG: 325 TABLET ORAL at 18:11

## 2024-04-25 RX ADMIN — ENOXAPARIN SODIUM 40 MG: 100 INJECTION SUBCUTANEOUS at 14:29

## 2024-04-25 RX ADMIN — CEFTRIAXONE SODIUM 1000 MG: 1 INJECTION, POWDER, FOR SOLUTION INTRAMUSCULAR; INTRAVENOUS at 21:27

## 2024-04-25 RX ADMIN — SODIUM CHLORIDE 1000 ML: 9 INJECTION, SOLUTION INTRAVENOUS at 10:44

## 2024-04-25 RX ADMIN — IPRATROPIUM BROMIDE AND ALBUTEROL SULFATE 1 DOSE: .5; 3 SOLUTION RESPIRATORY (INHALATION) at 10:32

## 2024-04-25 RX ADMIN — IPRATROPIUM BROMIDE AND ALBUTEROL SULFATE 1 DOSE: .5; 3 SOLUTION RESPIRATORY (INHALATION) at 10:31

## 2024-04-25 RX ADMIN — IOPAMIDOL 80 ML: 755 INJECTION, SOLUTION INTRAVENOUS at 10:54

## 2024-04-25 RX ADMIN — AMLODIPINE BESYLATE 5 MG: 5 TABLET ORAL at 14:30

## 2024-04-25 RX ADMIN — ACETAMINOPHEN 1000 MG: 500 TABLET ORAL at 10:44

## 2024-04-25 RX ADMIN — IPRATROPIUM BROMIDE AND ALBUTEROL SULFATE 1 DOSE: .5; 3 SOLUTION RESPIRATORY (INHALATION) at 16:23

## 2024-04-25 RX ADMIN — Medication 1750 MG: at 11:29

## 2024-04-25 RX ADMIN — PREDNISONE 40 MG: 20 TABLET ORAL at 12:06

## 2024-04-25 RX ADMIN — IPRATROPIUM BROMIDE AND ALBUTEROL SULFATE 1 DOSE: .5; 3 SOLUTION RESPIRATORY (INHALATION) at 20:43

## 2024-04-25 ASSESSMENT — PAIN SCALES - GENERAL
PAINLEVEL_OUTOF10: 2
PAINLEVEL_OUTOF10: 3
PAINLEVEL_OUTOF10: 0

## 2024-04-25 ASSESSMENT — PAIN DESCRIPTION - DESCRIPTORS
DESCRIPTORS: ACHING
DESCRIPTORS: ACHING

## 2024-04-25 ASSESSMENT — PAIN DESCRIPTION - ORIENTATION
ORIENTATION: LOWER;MID
ORIENTATION: RIGHT

## 2024-04-25 ASSESSMENT — LIFESTYLE VARIABLES
HOW MANY STANDARD DRINKS CONTAINING ALCOHOL DO YOU HAVE ON A TYPICAL DAY: 5 OR 6
HOW OFTEN DO YOU HAVE A DRINK CONTAINING ALCOHOL: 2-4 TIMES A MONTH

## 2024-04-25 ASSESSMENT — PAIN - FUNCTIONAL ASSESSMENT
PAIN_FUNCTIONAL_ASSESSMENT: ACTIVITIES ARE NOT PREVENTED
PAIN_FUNCTIONAL_ASSESSMENT: NONE - DENIES PAIN
PAIN_FUNCTIONAL_ASSESSMENT: ACTIVITIES ARE NOT PREVENTED

## 2024-04-25 ASSESSMENT — PAIN DESCRIPTION - LOCATION
LOCATION: HEAD
LOCATION: ABDOMEN

## 2024-04-25 NOTE — PROGRESS NOTES
[facility]   Pharmacy Pharmacokinetic Monitoring Service - Vancomycin     South Nettles is a 64 y.o. male starting on vancomycin therapy for HAP. Pharmacy consulted by ED provider Dr Ren Acosta for monitoring and adjustment.    Target Concentration: Goal AUC/DEBRA 400-600 mg*hr/L    Additional Antimicrobials: none    Pertinent Laboratory Values:   Wt Readings from Last 1 Encounters:   04/24/24 70.3 kg (155 lb)     Temp Readings from Last 1 Encounters:   04/25/24 97.9 °F (36.6 °C) (Oral)     Estimated Creatinine Clearance: 68 mL/min (based on SCr of 1 mg/dL).  Recent Labs     04/24/24  1140 04/25/24  0950   CREATININE 1.1 1.0   BUN 16 16   WBC 6.4 7.4       Plan:  Vancomycin 1750 mg (25mg/kg) IV X 1 dose in ED  If vancomycin is continued upon admission, please re-consult pharmacy to dose.      Thank you for the consult,  Victoria Gutierres PharmD 4/25/2024 10:33 AM

## 2024-04-25 NOTE — ED NOTES
ED to inpatient nurses report      Chief Complaint:  Chief Complaint   Patient presents with    Shortness of Breath    Abnormal Lab     Present to ED from: home    MOA:     LOC: alert and orientated to name, place, date  Mobility: Requires assistance * 1  Oxygen Baseline: room air    Current needs required: none     Code Status:   Full Code    What abnormal results were found and what did you give/do to treat them? Elevated troponin, COPD exacerbation, pneumonia  Any procedures or intervention occur? vanc    Mental Status:  Level of Consciousness: Alert (0)    Psych Assessment:        Vitals:  Patient Vitals for the past 24 hrs:   BP Temp Temp src Pulse Resp SpO2   04/25/24 1205 (!) 180/96 -- -- 100 18 95 %   04/25/24 1131 (!) 182/101 -- -- 100 18 95 %   04/25/24 1044 (!) 156/104 -- -- 100 19 100 %   04/25/24 0958 (!) 182/93 -- -- 98 21 95 %   04/25/24 0952 (!) 181/107 -- -- -- -- --   04/25/24 0951 -- 97.9 °F (36.6 °C) Oral (!) 103 24 95 %        LDAs:   Peripheral IV 04/25/24 Left Antecubital (Active)   Site Assessment Clean, dry & intact 04/25/24 1220   Line Status Flushed 04/25/24 1220   Line Care Connections checked and tightened 04/25/24 1220   Phlebitis Assessment No symptoms 04/25/24 1220   Infiltration Assessment 0 04/25/24 1220   Dressing Status Clean, dry & intact 04/25/24 1220   Dressing Type Transparent 04/25/24 1220       Ambulatory Status:  Presents to emergency department  because of falls (Syncope, seizure, or loss of consciousness): No, Age > 70: No, Altered Mental Status, Intoxication with alcohol or substance confusion (Disorientation, impaired judgment, poor safety awaremess, or inability to follow instructions): No, Impaired Mobility: Ambulates or transfers with assistive devices or assistance; Unable to ambulate or transer.: No, Nursing Judgement: No    Diagnosis:  DISPOSITION Admitted 04/25/2024 12:15:11 PM   Final diagnoses:   COPD exacerbation (HCC)   Hypoxia   Elevated troponin

## 2024-04-25 NOTE — PROGRESS NOTES
[facility]   Pharmacy Pharmacokinetic Monitoring Service - Vancomycin     South Nettles is a 64 y.o. male starting on vancomycin therapy for Bloodstream infection. Pharmacy consulted by Jimi Olson for monitoring and adjustment.    Target Concentration: Goal AUC/DEBRA 400-600 mg*hr/L    Additional Antimicrobials: Azithromycin, Ceftriaxone     Pertinent Laboratory Values:   Wt Readings from Last 1 Encounters:   04/24/24 70.3 kg (155 lb)     Temp Readings from Last 1 Encounters:   04/25/24 97.9 °F (36.6 °C) (Oral)     Estimated Creatinine Clearance: 68 mL/min (based on SCr of 1 mg/dL).  Recent Labs     04/24/24  1140 04/25/24  0950   CREATININE 1.1 1.0   BUN 16 16   WBC 6.4 7.4     Pertinent Cultures:  Date Source Results   4/24/24 BC2  GPC clusters; BCID staph species (mec A/C not tested)    MRSA Nasal Swab: N/A. Non-respiratory infection.    Plan:  Jimi Olson wants to treat the 1 out of 2 BC- staph species until repeat BC completed.    Dosing recommendations based on Bayesian software  Vancomycin 1750 mg IV administered in ED 4/25/2024 1129  Start vancomycin 1250 mg IV Q18H  Anticipated AUC of 516 and trough concentration of 13 at steady state  Renal labs as indicated   Vancomycin concentration within 48 hours  Pharmacy will continue to monitor patient and adjust therapy as indicated    Thank you for the consult,  Victoria Gutierres PharmD 4/25/2024 4:20 PM

## 2024-04-25 NOTE — ED PROVIDER NOTES
[facility]  EMERGENCY MEDICINE ATTENDING ATTESTATION      Evaluation of South Nettles.   Case discussed and care plan developed with resident physician.   I agree with the resident physician documentation and plan as documented by him, except if my documentation differs.   Patient seen, interviewed and examined by me  I reviewed the medical, surgical, family and social history, medications and allergies.   I have reviewed and interpreted all available lab, radiology and ekg results available at the moment.  I have reviewed the nursing documentation.     Please see the resident physician completed note for final disposition except as documented on this attestation.   I have reviewed and interpreted all available lab, radiology and ekg results available at the moment.  Diagnosis, treatment and disposition plans were discussed and agreed upon by patient.   This transcription was electronically signed. It was dictated by use of voice recognition software and electronically transcribed. The transcription may contain errors not detected in proofreading.     I performed direct supervision and was present for the critical portion following procedures: None  Critical care time on this case: None    Electronically signed by Kadeem Ochoa MD on 4/25/24 at 11:24 AM EDT       Kadeem Ochoa MD  04/25/24 9165

## 2024-04-25 NOTE — ED PROVIDER NOTES
[facility] EMERGENCY DEPT      EMERGENCY MEDICINE     Pt Name: South Nettles  MRN: [id number]  Birthdate 1960  Date of evaluation: 4/25/2024  Provider: Ren Acosta DO  Supervising Physician: Kadeem Ochoa MD    CHIEF COMPLAINT       Chief Complaint   Patient presents with    Shortness of Breath    Abnormal Lab     HISTORY OF PRESENT ILLNESS   Suoth Nettles is a 64 y.o. male with a history of COPD who presents to the emergency department from home for being called back for positive blood culture.  Patient was here last night for shortness of breath sent home with some oxygen.  Patient states even if he did not get the call for positive blood culture he is told to come back anyways because his breathing is worsening.  He has been worsening over the past month.  He was discharged a couple weeks ago after being admitted for a COPD exacerbation.  But his breathing has been worsening since then.  Patient on oxygen at baseline.  He states he cannot walk to the bathroom without feeling like he is going to pass out due to his breathing.  He complains of productive cough, intermittent chest pain.  Patient denies fever, chills, recent travel, recent surgery, hemoptysis.  He does have significant orthopnea.    PASTMEDICAL HISTORY     Past Medical History:   Diagnosis Date    Bilateral inguinal hernia 12/14/2011    Chronic back pain     COPD (chronic obstructive pulmonary disease) (HCC)     Cough     patient is a  has a chronic cough    Essential hypertension 04/01/2016    GERD (gastroesophageal reflux disease)     Osteoarthritis     Recurrent left inguinal hernia     Stomach ulcer        Patient Active Problem List   Diagnosis Code    History of smoking Z87.891    Primary hypertension I10    Urinary retention R33.9    Prediabetes R73.03    Spinal stenosis, thoracolumbar region M48.05    Lumbar radiculopathy M54.16    History of arthroplasty of left hip Z96.642    Acute hypoxemic respiratory failure (HCC)  medications were reviewed and indications and risks of medications were discussed with the patient /family present. Strict verbal and written return precautions, instructions and appropriate follow-up provided to  the patient.     ED Medications administered this visit:  (None if blank)  Medications   vancomycin (VANCOCIN) 1750 mg in sodium chloride 0.9 % 500 mL IVPB (1,750 mg IntraVENous New Bag 4/25/24 1129)   sodium chloride flush 0.9 % injection 5-40 mL (has no administration in time range)   sodium chloride flush 0.9 % injection 5-40 mL (has no administration in time range)   0.9 % sodium chloride infusion (has no administration in time range)   potassium chloride (KLOR-CON M) extended release tablet 40 mEq (has no administration in time range)     Or   potassium bicarb-citric acid (EFFER-K) effervescent tablet 40 mEq (has no administration in time range)     Or   potassium chloride 10 mEq/100 mL IVPB (Peripheral Line) (has no administration in time range)   magnesium sulfate 2000 mg in 50 mL IVPB premix (has no administration in time range)   enoxaparin (LOVENOX) injection 40 mg (has no administration in time range)   ondansetron (ZOFRAN-ODT) disintegrating tablet 4 mg (has no administration in time range)     Or   ondansetron (ZOFRAN) injection 4 mg (has no administration in time range)   polyethylene glycol (GLYCOLAX) packet 17 g (has no administration in time range)   acetaminophen (TYLENOL) tablet 650 mg (has no administration in time range)     Or   acetaminophen (TYLENOL) suppository 650 mg (has no administration in time range)   ipratropium 0.5 mg-albuterol 2.5 mg (DUONEB) nebulizer solution 1 Dose (1 Dose Inhalation Given 4/25/24 1032)   sodium chloride 0.9 % bolus 1,000 mL (1,000 mLs IntraVENous New Bag 4/25/24 1044)   acetaminophen (TYLENOL) tablet 1,000 mg (1,000 mg Oral Given 4/25/24 1044)   iopamidol (ISOVUE-370) 76 % injection 80 mL (80 mLs IntraVENous Given 4/25/24 1054)   ipratropium 0.5

## 2024-04-25 NOTE — ED TRIAGE NOTES
Presents to ED with c/o sob that has been worsening over the past couple weeks. Patient states he was seen here yesterday and called to come back for positive blood culture. Patient on home 3L NC. Patient states his sob is worse from yesterday. Alert and oriented.

## 2024-04-25 NOTE — H&P
Hospitalist History & Physical    Patient:  South Nettles    Unit/Bed:19/019A  YOB: 1960  MRN: [id number]   Acct: [id number]   PCP: Marsha Ireland MD  Code Status: Full Code    Date of Service: The patient was seen and examined on 04/25/24 and admitted to Inpatient with an expected length of stay of less than two midnights due to medical therapy.     Chief Complaint: Shortness of breath    Assessment/Plan:    Acute respiratory failure with hypoxia  Currently requiring 3 L nasal cannula, not on oxygen at baseline.  Likely secondary to COPD exacerbation.  Wean oxygen to maintain saturation of 88 to 92%.  Suspected COPD and acute exacerbation  No formal diagnosis.  No PFTs on file.  Has not been evaluated by pulmonology.  Has an appointment this upcoming Monday.  CT of the chest with emphysematous changes.  50-pack-year history of smoking.  Start prednisone.  Rocephin x 5 days.  Azithromycin x 3 days.  DuoNebs every 4 hours while awake and every 4 hours as needed.  Likely would benefit from maintenance inhaler upon discharge.  May benefit from a longer steroid taper given recurrence of symptoms.  Positive blood culture  1 of 2 cultures drawn on 4/24/2024 positive for Staphylococcus, second culture no growth to date.  Repeat cultures pending.  Likely contaminant.  Continue vancomycin until repeat cultures result.  Hypertension  Currently not well-controlled.  On lisinopril 30 mg daily.  Reports higher blood pressure readings since difficulties with breathing.  Readings prior to breathing difficulties typically in the 130s to 140s.  Add Norvasc 5 mg daily.  Gastroesophageal reflux disease  Continue daily PPI.  Chronic back pain  Continue daily meloxicam.  Suspected sleep apnea  Patient's wife reports excessive snoring as well as periods of apnea.  Follow-up with pulmonology as above.    History of Present Illness:  South Nettles is a 64 y.o. male with a history of chronic back pain,  of about 6.0 - 12.0 standard drinks of alcohol per week. He reports that he does not use drugs.    Family History:       Problem Relation Age of Onset    Arthritis Mother     Heart Disease Mother     High Blood Pressure Mother     Arthritis Father     Cancer Father         prostate    Diabetes Father     Heart Disease Father     High Blood Pressure Father     Colon Cancer Father 86    Heart Disease Sister     High Blood Pressure Sister     Learning Disabilities Sister     Learning Disabilities Brother     High Blood Pressure Brother     Heart Disease Brother     Arthritis Brother     Cancer Brother         prostate       Diet:  ADULT DIET; Regular    Physical Exam:  /83   Pulse (!) 110   Temp 97.9 °F (36.6 °C) (Oral)   Resp 22   SpO2 90%   General: Alert, in no acute distress, cooperative  HEENT:  Normocephalic and atraumatic.  No scleral icterus. External nares without deformity.  External ears normal. Mucous membranes are moist.  Neck: Trachea midline. No jugular venous distension.  Lungs: On 3 L nasal cannula. Respiratory rate and effort normal.  Diffuse expiratory wheezing in all lung fields.  Cardiac: Regular rhythm and rate.  No murmur, rubs, or gallops.  Abdomen: Non-distended and soft. No tenderness to palpation in all 4 quadrants. No guarding or rigidity.  Bowel sounds normoactive.  Extremities:  No clubbing, cyanosis, or lower extremity edema.  Vasculature: capillary refill < 3 seconds.  Lower extremity pulses 2+ bilaterally  Skin:  Warm and dry on exposed surfaces.  Psychiatric: Mood normal.  Affect appropriate.  Neurologic: Alert and oriented x4.  No cranial nerve deficits.  No extremity weakness.    Data: (All radiographs, tracings, PFTs, and imaging are personally viewed and interpreted unless otherwise noted)  Labs:   Recent Labs     04/24/24  1140 04/25/24  0950   WBC 6.4 7.4   HGB 15.7 15.6   HCT 47.9 46.9    282     Recent Labs     04/24/24  1140 04/25/24  0950    134*   K

## 2024-04-25 NOTE — PROGRESS NOTES
Pharmacy Note  [facility] Result    South Nettles is a 64 y.o. male, with a positive blood culture result    First Gram stain result: gram positive cocci in clusters    [facility] BCID result: Staphylococcus species, mecA is not reported    [employer] BCID and gram stain congruent? Yes    Suspected source? Unknown    Patient chart has been reviewed for signs/symptoms of infection: Yes  BP (!) 179/107   Pulse 92   Temp 98.3 °F (36.8 °C)   Resp 15   Ht 1.702 m (5' 7\")   Wt 70.3 kg (155 lb)   SpO2 96%   BMI 24.28 kg/m²   Lab Results   Component Value Date    WBC 7.4 04/25/2024     Allergies reviewed  Sulfa antibiotics    Renal function reviewed  Estimated Creatinine Clearance: 68 mL/min (based on SCr of 1 mg/dL).    Current antibiotic regimen: vancomycin, ceftriaxone and azithromycin    Intervention needed: No    Individual contacted: HYACINTH Mccray    Recommendations: None    Recommendations accepted? Yes    Joanie Fontaine [facility]  4/25/2024 2:46 PM

## 2024-04-25 NOTE — PROGRESS NOTES
Pt admitted to  [facility] in a wheelchair.     Complaints: respiratory failure.      IV IV push only, no IV fluids. IV site free of s/s of infection or infiltration. Vital signs obtained. Assessment and data collection initiated.     Two nurse skin assessment performed by Trey RAMON and Hali RAMON.  Oriented to room.     Explained patients right to have family, representative or physician notified of their admission.  Patient has Declined for physician to be notified.  Patient has Declined for family/representative to be notified.    The patient is interested in [facility] meds to beds program?:  No    Policies and procedures for  explained. All questions answered with no further questions at this time. Fall prevention and safety brochure discussed with patient.  Bed alarm on. Call light in reach.

## 2024-04-26 LAB
ANION GAP SERPL CALC-SCNC: 13 MEQ/L (ref 8–16)
BACTERIA BLD AEROBE CULT: ABNORMAL
BACTERIA BLD AEROBE CULT: NORMAL
BASOPHILS ABSOLUTE: 0 THOU/MM3 (ref 0–0.1)
BASOPHILS NFR BLD AUTO: 0.3 %
BUN SERPL-MCNC: 15 MG/DL (ref 7–22)
CALCIUM SERPL-MCNC: 9.2 MG/DL (ref 8.5–10.5)
CHLORIDE SERPL-SCNC: 103 MEQ/L (ref 98–111)
CO2 SERPL-SCNC: 24 MEQ/L (ref 23–33)
CREAT SERPL-MCNC: 0.9 MG/DL (ref 0.4–1.2)
DEPRECATED RDW RBC AUTO: 49.1 FL (ref 35–45)
EOSINOPHIL NFR BLD AUTO: 0.3 %
EOSINOPHILS ABSOLUTE: 0 THOU/MM3 (ref 0–0.4)
ERYTHROCYTE [DISTWIDTH] IN BLOOD BY AUTOMATED COUNT: 14.9 % (ref 11.5–14.5)
GFR SERPL CREATININE-BSD FRML MDRD: > 90 ML/MIN/1.73M2
GLUCOSE SERPL-MCNC: 98 MG/DL (ref 70–108)
HCT VFR BLD AUTO: 42 % (ref 42–52)
HGB BLD-MCNC: 13.7 GM/DL (ref 14–18)
IMM GRANULOCYTES # BLD AUTO: 0.04 THOU/MM3 (ref 0–0.07)
IMM GRANULOCYTES NFR BLD AUTO: 0.5 %
LYMPHOCYTES ABSOLUTE: 1.5 THOU/MM3 (ref 1–4.8)
LYMPHOCYTES NFR BLD AUTO: 18.6 %
MCH RBC QN AUTO: 29 PG (ref 26–33)
MCHC RBC AUTO-ENTMCNC: 32.6 GM/DL (ref 32.2–35.5)
MCV RBC AUTO: 89 FL (ref 80–94)
MONOCYTES ABSOLUTE: 0.6 THOU/MM3 (ref 0.4–1.3)
MONOCYTES NFR BLD AUTO: 8 %
MRSA DNA SPEC QL NAA+PROBE: NEGATIVE
NEUTROPHILS NFR BLD AUTO: 72.3 %
NRBC BLD AUTO-RTO: 0 /100 WBC
ORGANISM: ABNORMAL
PLATELET # BLD AUTO: 265 THOU/MM3 (ref 130–400)
PMV BLD AUTO: 10 FL (ref 9.4–12.4)
POTASSIUM SERPL-SCNC: 4.7 MEQ/L (ref 3.5–5.2)
RBC # BLD AUTO: 4.72 MILL/MM3 (ref 4.7–6.1)
SEGMENTED NEUTROPHILS ABSOLUTE COUNT: 5.7 THOU/MM3 (ref 1.8–7.7)
SODIUM SERPL-SCNC: 140 MEQ/L (ref 135–145)
WBC # BLD AUTO: 7.9 THOU/MM3 (ref 4.8–10.8)

## 2024-04-26 PROCEDURE — 6370000000 HC RX 637 (ALT 250 FOR IP): Performed by: PHYSICIAN ASSISTANT

## 2024-04-26 PROCEDURE — 85025 COMPLETE CBC W/AUTO DIFF WBC: CPT

## 2024-04-26 PROCEDURE — 6370000000 HC RX 637 (ALT 250 FOR IP)

## 2024-04-26 PROCEDURE — 36415 COLL VENOUS BLD VENIPUNCTURE: CPT

## 2024-04-26 PROCEDURE — 94761 N-INVAS EAR/PLS OXIMETRY MLT: CPT

## 2024-04-26 PROCEDURE — 1200000003 HC TELEMETRY R&B

## 2024-04-26 PROCEDURE — 99233 SBSQ HOSP IP/OBS HIGH 50: CPT | Performed by: INTERNAL MEDICINE

## 2024-04-26 PROCEDURE — 94640 AIRWAY INHALATION TREATMENT: CPT

## 2024-04-26 PROCEDURE — 2700000000 HC OXYGEN THERAPY PER DAY

## 2024-04-26 PROCEDURE — 80048 BASIC METABOLIC PNL TOTAL CA: CPT

## 2024-04-26 PROCEDURE — 6370000000 HC RX 637 (ALT 250 FOR IP): Performed by: INTERNAL MEDICINE

## 2024-04-26 PROCEDURE — 2580000003 HC RX 258: Performed by: PHYSICIAN ASSISTANT

## 2024-04-26 PROCEDURE — 6360000002 HC RX W HCPCS: Performed by: PHYSICIAN ASSISTANT

## 2024-04-26 PROCEDURE — 87641 MR-STAPH DNA AMP PROBE: CPT

## 2024-04-26 PROCEDURE — 94669 MECHANICAL CHEST WALL OSCILL: CPT

## 2024-04-26 RX ORDER — AZITHROMYCIN 250 MG/1
500 TABLET, FILM COATED ORAL DAILY
Status: COMPLETED | OUTPATIENT
Start: 2024-04-26 | End: 2024-04-27

## 2024-04-26 RX ORDER — BENZONATATE 100 MG/1
100 CAPSULE ORAL 3 TIMES DAILY PRN
Status: DISCONTINUED | OUTPATIENT
Start: 2024-04-26 | End: 2024-04-27

## 2024-04-26 RX ADMIN — SODIUM CHLORIDE, PRESERVATIVE FREE 10 ML: 5 INJECTION INTRAVENOUS at 20:07

## 2024-04-26 RX ADMIN — IPRATROPIUM BROMIDE AND ALBUTEROL SULFATE 1 DOSE: .5; 3 SOLUTION RESPIRATORY (INHALATION) at 17:31

## 2024-04-26 RX ADMIN — AMLODIPINE BESYLATE 5 MG: 5 TABLET ORAL at 08:04

## 2024-04-26 RX ADMIN — IPRATROPIUM BROMIDE AND ALBUTEROL SULFATE 1 DOSE: .5; 3 SOLUTION RESPIRATORY (INHALATION) at 11:30

## 2024-04-26 RX ADMIN — BENZONATATE 100 MG: 100 CAPSULE ORAL at 08:12

## 2024-04-26 RX ADMIN — ACETAMINOPHEN 650 MG: 325 TABLET ORAL at 08:12

## 2024-04-26 RX ADMIN — LISINOPRIL 30 MG: 20 TABLET ORAL at 08:04

## 2024-04-26 RX ADMIN — IPRATROPIUM BROMIDE AND ALBUTEROL SULFATE 1 DOSE: .5; 3 SOLUTION RESPIRATORY (INHALATION) at 07:10

## 2024-04-26 RX ADMIN — SODIUM CHLORIDE, PRESERVATIVE FREE 10 ML: 5 INJECTION INTRAVENOUS at 08:04

## 2024-04-26 RX ADMIN — VANCOMYCIN HYDROCHLORIDE 1250 MG: 5 INJECTION, POWDER, LYOPHILIZED, FOR SOLUTION INTRAVENOUS at 04:47

## 2024-04-26 RX ADMIN — IPRATROPIUM BROMIDE AND ALBUTEROL SULFATE 1 DOSE: .5; 3 SOLUTION RESPIRATORY (INHALATION) at 20:42

## 2024-04-26 RX ADMIN — MELOXICAM 15 MG: 7.5 TABLET ORAL at 08:04

## 2024-04-26 RX ADMIN — BENZONATATE 100 MG: 100 CAPSULE ORAL at 20:07

## 2024-04-26 RX ADMIN — PREDNISONE 40 MG: 20 TABLET ORAL at 08:04

## 2024-04-26 RX ADMIN — ENOXAPARIN SODIUM 40 MG: 100 INJECTION SUBCUTANEOUS at 08:13

## 2024-04-26 RX ADMIN — AZITHROMYCIN DIHYDRATE 500 MG: 250 TABLET ORAL at 15:57

## 2024-04-26 ASSESSMENT — PAIN SCALES - GENERAL
PAINLEVEL_OUTOF10: 0

## 2024-04-26 NOTE — PLAN OF CARE
Problem: Respiratory - Adult  Goal: Clear lung sounds  Outcome: Progressing  Note: Patient had expiratory wheezing throughout all lung fields. Continue taking breathing treatments as ordered to improve aeration.

## 2024-04-26 NOTE — PLAN OF CARE
Problem: Discharge Planning  Goal: Discharge to home or other facility with appropriate resources  Outcome: Progressing  Flowsheets (Taken 4/25/2024 2134)  Discharge to home or other facility with appropriate resources:   Identify barriers to discharge with patient and caregiver   Arrange for needed discharge resources and transportation as appropriate   Identify discharge learning needs (meds, wound care, etc)   Arrange for interpreters to assist at discharge as needed   Refer to discharge planning if patient needs post-hospital services based on physician order or complex needs related to functional status, cognitive ability or social support system  Note: Needs Home 02 continued 4LNC (3LNC at Home before)     Problem: Pain  Goal: Verbalizes/displays adequate comfort level or baseline comfort level  Outcome: Progressing  Flowsheets (Taken 4/25/2024 2134)  Verbalizes/displays adequate comfort level or baseline comfort level:   Encourage patient to monitor pain and request assistance   Administer analgesics based on type and severity of pain and evaluate response   Assess pain using appropriate pain scale   Implement non-pharmacological measures as appropriate and evaluate response   Consider cultural and social influences on pain and pain management   Notify Licensed Independent Practitioner if interventions unsuccessful or patient reports new pain  Note: Utilize correct pain assessment tool based on patient abilities.  Use appropriate pain medications base on appropriate tools.  Utilize non-pharmacologic pain reduction methods to supplement ordered pain medications.Educate patient on pain control.  Educate patient on acceptable pain level with chronic pain.   Talk to patient about non-pharmaceutical pain interventions.Encourage use of medication when needed.  Promote rest and distractions from pain.   Care plan reviewed with patient.  Patient verbalizes understanding of the plan of care and contribute to goal

## 2024-04-26 NOTE — CARE COORDINATION
Case Management Assessment Initial Evaluation    Date/Time of Evaluation: 2024 3:07 PM  Assessment Completed by: Jaclyn Lr RN    If patient is discharged prior to next notation, then this note serves as note for discharge by case management.    Patient Name: [place]                   YOB: 1960  Diagnosis: Bacteremia [R78.81]  Hypoxia [R09.02]  Elevated troponin [R79.89]  COPD exacerbation (HCC) [J44.1]  Acute respiratory failure with hypoxia (HCC) [J96.01]                   Date / Time: 2024  9:39 AM  Location: -A     Patient Admission Status: Inpatient   Readmission Risk Low 0-14, Mod 15-19), High > 20: Readmission Risk Score: 8    Current PCP: Marsha Ireland MD    Additional Case Management Notes:  Admit from ER after being called back for positive blood culture and SOB.  On 4 L O2. Repeat set of cultures collected. PO Zithromax.    Procedures: none    Imagin/25 CXR: No interval change since previous study dated 2024, no acute cardiopulmonary disease..    CTA Chest W WO:   1. No filling defects are noted within the pulmonary arterial vasculature to suggest the presence of pulmonary embolus. No acute intrathoracic process is observed.    2. Centrilobular emphysema and stable chronic scarring in the right upper lobe. Chronic findings are discussed.          Patient Goals/Plan/Treatment Preferences: Planning to return home with his wife. Has a walker he isn't currently using.   [facility]        [id number]   Service Assessment   Patient Orientation Alert and Oriented   Cognition Alert   History Provided By Patient   Primary Caregiver Self   Accompanied By/Relationship alone   Support Systems Spouse/Significant Other   Patient's Healthcare Decision Maker is: Legal Next of Kin   PCP Verified by CM Yes   Last Visit to PCP Within last 3 months   Prior Functional Level Independent in ADLs/IADLs   Current Functional Level  Independent in ADLs/IADLs   Can patient return to prior living arrangement Yes   Ability to make needs known: Good   Family able to assist with home care needs: Yes   Would you like for me to discuss the discharge plan with any other family members/significant others, and if so, who? No   Financial Resources Medicare   Community Resources None   Discharge Planning   Type of Residence House   Living Arrangements Spouse/Significant Other   Current Services Prior To Admission Durable Medical Equipment;Oxygen Therapy  (2-3 L O2 from [facility])   Current DME Prior to Arrival Walker;Home Aerosol   Potential Assistance Needed N/A   DME Ordered? No   Potential Assistance Purchasing Medications No   Type of Home Care Services None   Patient expects to be discharged to: House   One/Two Story Residence Two story, live on first floor   Services At/After Discharge   Transition of Care Consult (CM Consult) Discharge Planning   Services At/After Discharge None   Mode of Transport at Discharge Other (see comment)  (family)   Confirm Follow Up Transport Self   Condition of Participation: Discharge Planning   The Plan for Transition of Care is related to the following treatment goals: breathe easier

## 2024-04-26 NOTE — PROGRESS NOTES
Internal Medicine Resident Progress Note    Name: [place], male, : 1960, MRN: [id number]    PCP: Marsha Ireland MD    Date of Admission: 2024  Date of Service: Pt seen/examined on 24      Assessment/Plan:  Acute hypoxic respiratory failure in setting of suspected COPD with centrilobular emphysema: No PFTs on record.  Patient denies ever having them performed.  On arrival patient required nasal cannula.  Prior hospitalization - for shortness of breath thought to be secondary to COPD exacerbation.  On admission patient was started on azithromycin, Rocephin, and vancomycin.  CTA chest on  negative for PE.  It showed centrilobular emphysema with stable chronic scarring of right upper lobe  Low suspicion for infectious process at this time.  Discontinue Rocephin and vancomycin.  Continue azithromycin for anti-inflammatory purposes  Start prednisone 40 mg daily for 4 days: Current day 1  Continue DuoNeb  Wean supplemental oxygen as tolerated to maintain SpO2 greater than 88%  Primary hypertension: Home medication: Lisinopril  Continue lisinopril 30 mg  Continue amlodipine 5 mg daily  GERD: Home medication: Prilosec  Chronic back pain: Home medication: Meloxicam  Continue meloxicam 15 mg daily  Suspected obstructive sleep apnea: Patient's wife reports excessive snoring and periods of apnea during night.  Patient to follow-up with pulmonology outpatient.  Recommended sleep study.     LDA: []CVC / []PICC / []Midline / []Baker / []Drains / []Mediport / [x]None  Antibiotics: Azithromycin  Steroids: Prednisone  Labs (still needed?): [x]Yes / []No  IVF (still needed?): []Yes / [x]No    Level of care: []Step Down / [x]Med-Surg  Bed Status: [x]Inpatient / []Observation  Telemetry: [x]Yes / []No  PT/OT: [x]Yes / []No    DVT Prophylaxis: [] Lovenox / [] Heparin / [] SCDs / [] Already on Systemic Anticoagulation / [] None     Expected discharge date: Pending clinical  chills.    ROS: reviewed complete ROS unchanged unless otherwise stated in hospital course/subjective portion.       Medications:  Reviewed    azithromycin, 500 mg, Oral, Daily    sodium chloride flush, 5-40 mL, IntraVENous, 2 times per day    enoxaparin, 40 mg, SubCUTAneous, Q24H    amLODIPine, 5 mg, Oral, Daily    ipratropium 0.5 mg-albuterol 2.5 mg, 1 Dose, Inhalation, Q4H WA RT    predniSONE, 40 mg, Oral, Daily    lisinopril, 30 mg, Oral, Daily    meloxicam, 15 mg, Oral, Daily         Intake/Output Summary (Last 24 hours) at 4/26/2024 1543  Last data filed at 4/26/2024 1524  Gross per 24 hour   Intake 1497.15 ml   Output --   Net 1497.15 ml       Exam:  /74   Pulse (!) 101   Temp 97.9 °F (36.6 °C) (Oral)   Resp 16   Ht 1.702 m (5' 7\")   Wt 70.3 kg (155 lb)   SpO2 95%   BMI 24.28 kg/m²     General: No distress, appears stated age.  Eyes:  PERRL. Conjunctivae/corneas clear.  HENT: Head normal appearing. Nares normal. Oral mucosa moist.  Hearing intact.   Neck: Supple, with full range of motion. Trachea midline.  No gross JVD appreciated.  Respiratory:  Normal effort.  Diffuse wheezing heard.  Nasal cannula in place  Cardiovascular: Normal rate, regular rhythm with normal S1/S2 without murmurs.    No lower extremity edema.   Abdomen: Soft, non-tender, non-distended with normal bowel sounds.  Musculoskeletal: No joint swelling or tenderness. Normal tone. No abnormal movements.   Skin: Warm and dry. No rashes or lesions.  Neurologic:  No focal sensory/motor deficits in the upper or lower extremities. Cranial nerves:  grossly non-focal 2-12.     Psychiatric: Alert and oriented, normal insight and thought content.   Capillary Refill: Brisk,< 3 seconds.  Peripheral Pulses: +2 palpable, equal bilaterally.       Labs:   Recent Labs     04/24/24  1140 04/25/24  0950 04/26/24  0523   WBC 6.4 7.4 7.9   HGB 15.7 15.6 13.7*   HCT 47.9 46.9 42.0    282 265     Recent Labs     04/24/24  1140 04/25/24  0950

## 2024-04-26 NOTE — CARE COORDINATION
04/26/24 1500   Readmission Assessment   Number of Days since last admission? 8-30 days   Previous Disposition Home with Family   Who is being Interviewed Patient   What was the patient's/caregiver's perception as to why they think they needed to return back to the hospital? Other (Comment)  (SOB and called back for blood cultures)   Did you visit your Primary Care Physician after you left the hospital, before you returned this time? Yes   Did you see a specialist, such as Cardiac, Pulmonary, Orthopedic Physician, etc. after you left the hospital? No  (scheduled with pulmonology next week)   Who advised the patient to return to the hospital? Physician   Does the patient report anything that got in the way of taking their medications? No   In our efforts to provide the best possible care to you and others like you, can you think of anything that we could have done to help you after you left the hospital the first time, so that you might not have needed to return so soon? Other (Comment)  (States he \"started feeling better\" and was ready for discharge)     However patient states that when he was just in the ER the provider was \"saying that it was just indigestion\".

## 2024-04-27 LAB
REASON FOR REJECTION: NORMAL
REASON FOR REJECTION: NORMAL
REJECTED TEST: NORMAL
REJECTED TEST: NORMAL

## 2024-04-27 PROCEDURE — 2580000003 HC RX 258: Performed by: PHYSICIAN ASSISTANT

## 2024-04-27 PROCEDURE — 94640 AIRWAY INHALATION TREATMENT: CPT

## 2024-04-27 PROCEDURE — 94761 N-INVAS EAR/PLS OXIMETRY MLT: CPT

## 2024-04-27 PROCEDURE — 6370000000 HC RX 637 (ALT 250 FOR IP): Performed by: INTERNAL MEDICINE

## 2024-04-27 PROCEDURE — 6370000000 HC RX 637 (ALT 250 FOR IP): Performed by: PHYSICIAN ASSISTANT

## 2024-04-27 PROCEDURE — 87205 SMEAR GRAM STAIN: CPT

## 2024-04-27 PROCEDURE — 1200000003 HC TELEMETRY R&B

## 2024-04-27 PROCEDURE — 99232 SBSQ HOSP IP/OBS MODERATE 35: CPT | Performed by: INTERNAL MEDICINE

## 2024-04-27 PROCEDURE — 6360000002 HC RX W HCPCS: Performed by: PHYSICIAN ASSISTANT

## 2024-04-27 RX ORDER — GUAIFENESIN 600 MG/1
1200 TABLET, EXTENDED RELEASE ORAL 2 TIMES DAILY
Status: DISCONTINUED | OUTPATIENT
Start: 2024-04-27 | End: 2024-04-28 | Stop reason: HOSPADM

## 2024-04-27 RX ORDER — BENZONATATE 100 MG/1
200 CAPSULE ORAL 3 TIMES DAILY PRN
Status: DISCONTINUED | OUTPATIENT
Start: 2024-04-27 | End: 2024-04-28 | Stop reason: HOSPADM

## 2024-04-27 RX ADMIN — BENZONATATE 200 MG: 100 CAPSULE ORAL at 19:57

## 2024-04-27 RX ADMIN — LISINOPRIL 30 MG: 20 TABLET ORAL at 08:00

## 2024-04-27 RX ADMIN — ENOXAPARIN SODIUM 40 MG: 100 INJECTION SUBCUTANEOUS at 08:03

## 2024-04-27 RX ADMIN — ACETAMINOPHEN 650 MG: 325 TABLET ORAL at 19:57

## 2024-04-27 RX ADMIN — PREDNISONE 40 MG: 20 TABLET ORAL at 08:00

## 2024-04-27 RX ADMIN — IPRATROPIUM BROMIDE AND ALBUTEROL SULFATE 1 DOSE: .5; 3 SOLUTION RESPIRATORY (INHALATION) at 21:12

## 2024-04-27 RX ADMIN — IPRATROPIUM BROMIDE AND ALBUTEROL SULFATE 1 DOSE: .5; 3 SOLUTION RESPIRATORY (INHALATION) at 16:39

## 2024-04-27 RX ADMIN — MELOXICAM 15 MG: 7.5 TABLET ORAL at 08:00

## 2024-04-27 RX ADMIN — GUAIFENESIN 1200 MG: 600 TABLET, EXTENDED RELEASE ORAL at 19:57

## 2024-04-27 RX ADMIN — IPRATROPIUM BROMIDE AND ALBUTEROL SULFATE 1 DOSE: .5; 3 SOLUTION RESPIRATORY (INHALATION) at 08:43

## 2024-04-27 RX ADMIN — SODIUM CHLORIDE, PRESERVATIVE FREE 10 ML: 5 INJECTION INTRAVENOUS at 08:00

## 2024-04-27 RX ADMIN — AMLODIPINE BESYLATE 5 MG: 5 TABLET ORAL at 08:00

## 2024-04-27 RX ADMIN — GUAIFENESIN 1200 MG: 600 TABLET, EXTENDED RELEASE ORAL at 08:05

## 2024-04-27 RX ADMIN — IPRATROPIUM BROMIDE AND ALBUTEROL SULFATE 1 DOSE: .5; 3 SOLUTION RESPIRATORY (INHALATION) at 13:26

## 2024-04-27 RX ADMIN — BENZONATATE 200 MG: 100 CAPSULE ORAL at 08:01

## 2024-04-27 RX ADMIN — AZITHROMYCIN DIHYDRATE 500 MG: 250 TABLET ORAL at 08:00

## 2024-04-27 ASSESSMENT — PAIN DESCRIPTION - LOCATION: LOCATION: RIB CAGE

## 2024-04-27 ASSESSMENT — PAIN SCALES - GENERAL
PAINLEVEL_OUTOF10: 0
PAINLEVEL_OUTOF10: 3
PAINLEVEL_OUTOF10: 0

## 2024-04-27 ASSESSMENT — PAIN DESCRIPTION - DESCRIPTORS: DESCRIPTORS: ACHING;SORE

## 2024-04-27 ASSESSMENT — PAIN - FUNCTIONAL ASSESSMENT: PAIN_FUNCTIONAL_ASSESSMENT: ACTIVITIES ARE NOT PREVENTED

## 2024-04-27 ASSESSMENT — PAIN DESCRIPTION - ORIENTATION: ORIENTATION: RIGHT

## 2024-04-27 NOTE — PLAN OF CARE
Problem: Discharge Planning  Goal: Discharge to home or other facility with appropriate resources  4/26/2024 2223 by Shimon Queen RN  Outcome: Progressing     Problem: Pain  Goal: Verbalizes/displays adequate comfort level or baseline comfort level  4/26/2024 2223 by Shimon Queen RN  Outcome: Progressing     Problem: Respiratory - Adult  Goal: Clear lung sounds  4/26/2024 1004 by Tg Lugo RCP  Outcome: Progressing  Note: Patient had expiratory wheezing throughout all lung fields. Continue taking breathing treatments as ordered to improve aeration.     Problem: Safety - Adult  Goal: Free from fall injury  Outcome: Progressing     Problem: Chronic Conditions and Co-morbidities  Goal: Patient's chronic conditions and co-morbidity symptoms are monitored and maintained or improved  Outcome: Progressing  Care plan reviewed with patient and verbalize understanding of the plan of care and contribute to goal setting.

## 2024-04-27 NOTE — PLAN OF CARE
Problem: Discharge Planning  Goal: Discharge to home or other facility with appropriate resources  Outcome: Progressing     Problem: Pain  Goal: Verbalizes/displays adequate comfort level or baseline comfort level  Outcome: Progressing     Problem: Respiratory - Adult  Goal: Clear lung sounds  4/27/2024 0848 by Adelia Lara RCP  Outcome: Progressing     Problem: Safety - Adult  Goal: Free from fall injury  Outcome: Progressing     Problem: Chronic Conditions and Co-morbidities  Goal: Patient's chronic conditions and co-morbidity symptoms are monitored and maintained or improved  Outcome: Progressing

## 2024-04-27 NOTE — PLAN OF CARE
Problem: Respiratory - Adult  Goal: Clear lung sounds  Outcome: Progressing   Continue breathing medications to improve aeration of lungs. Patient mutually agreed on goals.

## 2024-04-27 NOTE — PROGRESS NOTES
[facility]  Pulmonary medicine clinic initial consult note.      Patient: South Nettles  : 1960      Chief complaint/[place]: [place] is a 64 y.o. old male came for further evaluation regarding his ? COPD and shortness of breath with referral from Marsha Aaron MD.     He was diagnosed with a COPD few years back by his family physician Dr. Ladi Ireland MD.  He is currently using following inhalers:  DuoNebs 3 Lilliam nebulization every 6 hourly as needed  Albuterol HFA 2 puffs every 6 hourly as needed.    He was recently hospitalized to [facility] under hospitalist service on 2024 for acute hypoxic respiratory failure due to COPD exacerbation.  He was treated with prednisone along with Rocephin and Zithromax.  He was discharged home with a home oxygen.    He underwent a home oxygen evaluation on 2024 during his hospitalization to [facility].  He was prescribed with home oxygen 3 L/min.  His home oxygen was increased to 4 L/min at the time of his discharge from recent hospitalization.      He is having shortness of breath: Yes  Onset: Few months  Duration: Few months  Diurnal variation:  None.    Current functional capacity on level ground: Distance he can walk on level ground: 2 blocks on level ground  He can climb steps: Yes-he developes shortness of breath after climbing 1 flight of stairs.  Flights of steps he can climb: 1  He gives a history of orthopnea:No  He gives a history of paroxysmal nocturnal dyspnea:No       He is having cough: Yes  Duration of cough: for the last 2-month  His cough is associated with sputum production: Yes   The sputum color: Thick and light yellow-he was recently treated with Rocephin and Zithromax  Hemoptysis:No  Diurnal variation: None.       He is having chest pain: Very minimal-his chest pain is associated with coughing.  Duration:Days: 1-2 1.5

## 2024-04-27 NOTE — PROGRESS NOTES
Assessment and Plan:        Exacerbation copd:  improving; continue rx.      CC:  sob  HPI: pt with copd, recent adm for same, became more sob after discharge.  Coughing up white- yellow sputum.  Less wheezing today.  CT scan does not show any infiltrates.  ROS (12 point review of systems completed.  Pertinent positives noted. Otherwise ROS is negative) :     PMH:  Per HPI  SHX:  , recently quit smoking  FHX: colon ca, heart, hbp  Allergies: See above    Medications:     sodium chloride        guaiFENesin  1,200 mg Oral BID    azithromycin  500 mg Oral Daily    sodium chloride flush  5-40 mL IntraVENous 2 times per day    enoxaparin  40 mg SubCUTAneous Q24H    amLODIPine  5 mg Oral Daily    ipratropium 0.5 mg-albuterol 2.5 mg  1 Dose Inhalation Q4H WA RT    predniSONE  40 mg Oral Daily    lisinopril  30 mg Oral Daily    meloxicam  15 mg Oral Daily       Vital Signs:   /86   Pulse 89   Temp 98.2 °F (36.8 °C) (Oral)   Resp 16   Ht 1.702 m (5' 7\")   Wt 70.3 kg (155 lb)   SpO2 91%   BMI 24.28 kg/m²      Intake/Output Summary (Last 24 hours) at 4/27/2024 0707  Last data filed at 4/27/2024 0634  Gross per 24 hour   Intake 800 ml   Output --   Net 800 ml        Physical Examination: General appearance - alert, well appearing, and in no distress  Mental status - alert, oriented to person, place, and time  Neck - supple, no significant adenopathy, no JVD, or carotid bruits  Chest - bilateral faint wheezes  Heart - normal rate, regular rhythm, normal S1, S2, no murmurs, rubs, clicks or gallops  Abdomen - soft, nontender, nondistended, no masses or organomegaly  Neurological - alert, oriented, normal speech, no focal findings or movement disorder noted  Musculoskeletal - no muscular tenderness noted  Extremities - no pedal edema noted  Skin - normal coloration and turgor, no rashes, no suspicious skin lesions noted    Data: (All radiographs, tracings, PFTs, and imaging are personally viewed and interpreted

## 2024-04-28 VITALS
HEIGHT: 67 IN | BODY MASS INDEX: 24.33 KG/M2 | SYSTOLIC BLOOD PRESSURE: 152 MMHG | RESPIRATION RATE: 16 BRPM | WEIGHT: 155 LBS | TEMPERATURE: 97.5 F | HEART RATE: 94 BPM | OXYGEN SATURATION: 95 % | DIASTOLIC BLOOD PRESSURE: 90 MMHG

## 2024-04-28 PROCEDURE — 6370000000 HC RX 637 (ALT 250 FOR IP): Performed by: PHYSICIAN ASSISTANT

## 2024-04-28 PROCEDURE — 94640 AIRWAY INHALATION TREATMENT: CPT

## 2024-04-28 PROCEDURE — 6360000002 HC RX W HCPCS: Performed by: PHYSICIAN ASSISTANT

## 2024-04-28 PROCEDURE — 99239 HOSP IP/OBS DSCHRG MGMT >30: CPT | Performed by: INTERNAL MEDICINE

## 2024-04-28 PROCEDURE — 94761 N-INVAS EAR/PLS OXIMETRY MLT: CPT

## 2024-04-28 PROCEDURE — 2580000003 HC RX 258: Performed by: PHYSICIAN ASSISTANT

## 2024-04-28 PROCEDURE — 6370000000 HC RX 637 (ALT 250 FOR IP): Performed by: INTERNAL MEDICINE

## 2024-04-28 RX ORDER — PREDNISONE 20 MG/1
40 TABLET ORAL DAILY
Qty: 6 TABLET | Refills: 0 | Status: SHIPPED | OUTPATIENT
Start: 2024-04-29 | End: 2024-05-02

## 2024-04-28 RX ORDER — AMLODIPINE BESYLATE 5 MG/1
5 TABLET ORAL DAILY
Qty: 30 TABLET | Refills: 0 | Status: SHIPPED | OUTPATIENT
Start: 2024-04-29 | End: 2024-05-07 | Stop reason: SDUPTHER

## 2024-04-28 RX ADMIN — PREDNISONE 40 MG: 20 TABLET ORAL at 08:01

## 2024-04-28 RX ADMIN — GUAIFENESIN 1200 MG: 600 TABLET, EXTENDED RELEASE ORAL at 08:01

## 2024-04-28 RX ADMIN — ENOXAPARIN SODIUM 40 MG: 100 INJECTION SUBCUTANEOUS at 08:03

## 2024-04-28 RX ADMIN — BENZONATATE 200 MG: 100 CAPSULE ORAL at 08:02

## 2024-04-28 RX ADMIN — LISINOPRIL 30 MG: 20 TABLET ORAL at 08:01

## 2024-04-28 RX ADMIN — AMLODIPINE BESYLATE 5 MG: 5 TABLET ORAL at 08:01

## 2024-04-28 RX ADMIN — SODIUM CHLORIDE, PRESERVATIVE FREE 10 ML: 5 INJECTION INTRAVENOUS at 08:01

## 2024-04-28 RX ADMIN — MELOXICAM 15 MG: 7.5 TABLET ORAL at 08:01

## 2024-04-28 RX ADMIN — IPRATROPIUM BROMIDE AND ALBUTEROL SULFATE 1 DOSE: .5; 3 SOLUTION RESPIRATORY (INHALATION) at 08:50

## 2024-04-28 ASSESSMENT — PAIN SCALES - GENERAL
PAINLEVEL_OUTOF10: 0
PAINLEVEL_OUTOF10: 0

## 2024-04-28 NOTE — DISCHARGE INSTRUCTIONS
Keep current Pulmonology appointment on 4/29.   Follow up with PCP outpatient.   Continue Prednisone for 3 days.  Start

## 2024-04-28 NOTE — PLAN OF CARE
Problem: Respiratory - Adult  Goal: Clear lung sounds  4/27/2024 2115 by Delilah Caldwell RCP  Outcome: Progressing   Patient continues on scheduled breathing treatments to improve lung aeration. Patient mutually agrees upon goals.

## 2024-04-28 NOTE — DISCHARGE SUMMARY
Kit  1 kit by Does not apply route in the morning, at noon, in the evening, and at bedtime            STOP taking these medications      esomeprazole 40 MG delayed release capsule  Commonly known as: NEXIUM     famotidine 20 MG tablet  Commonly known as: Pepcid     omeprazole 10 MG delayed release capsule  Commonly known as: PRILOSEC               Where to Get Your Medications        These medications were sent to [id number] - [facility] [address] [facility] -  [phone] - F [phone]  [address] [place] [id number]      Phone: [phone]   amLODIPine 5 MG tablet  predniSONE 20 MG tablet          Time Spent on discharge is 35 minutes in the examination, evaluation, counseling and review of medications and discharge plan.    Thank you Marsha Ireland MD for the opportunity to be involved in this patient's care.      Signed:    Electronically signed by Isidro Herrera MD on 4/28/24 at 10:56 AM EDT     Case was discussed with Attending, Dr. Kwong    This report has been created using voice recognition software. It may contain minor errors which are inherent in voice recognition technology

## 2024-04-28 NOTE — PLAN OF CARE
Problem: Discharge Planning  Goal: Discharge to home or other facility with appropriate resources  4/27/2024 2130 by Allyssa Darling RN  Outcome: Progressing  Flowsheets (Taken 4/26/2024 2223 by Shimon Queen, RN)  Discharge to home or other facility with appropriate resources:   Identify barriers to discharge with patient and caregiver   Arrange for needed discharge resources and transportation as appropriate   Refer to discharge planning if patient needs post-hospital services based on physician order or complex needs related to functional status, cognitive ability or social support system   Identify discharge learning needs (meds, wound care, etc)  Note: Feedback readiness for discharge.  Promoting inclusion for discharge planning.   4/27/2024 1611 by González Valencia RN  Outcome: Progressing     Problem: Respiratory - Adult  Goal: Clear lung sounds  4/27/2024 2115 by Delilah Caldwell RCP  Outcome: Progressing  4/27/2024 0848 by Adelia Lara RCP  Outcome: Progressing     Problem: Safety - Adult  Goal: Free from fall injury  4/27/2024 2130 by Allyssa Darling RN  Outcome: Progressing  Flowsheets (Taken 4/27/2024 2130)  Free From Fall Injury:   Instruct family/caregiver on patient safety   Based on caregiver fall risk screen, instruct family/caregiver to ask for assistance with transferring infant if caregiver noted to have fall risk factors  Note: Bed in low position  Call light in reach  Bed wheel lock  Teaching to use call light for assistance  [place] Rounding  Non Skid Socks    4/27/2024 1611 by González Valencia RN  Outcome: Progressing     Problem: Chronic Conditions and Co-morbidities  Goal: Patient's chronic conditions and co-morbidity symptoms are monitored and maintained or improved  4/27/2024 2130 by Allyssa Darling, RN  Outcome: Progressing  Flowsheets (Taken 4/27/2024 2130)  Care Plan - Patient's Chronic Conditions and Co-Morbidity Symptoms are Monitored and Maintained or Improved:    Monitor and assess patient's chronic conditions and comorbid symptoms for stability, deterioration, or improvement   Collaborate with multidisciplinary team to address chronic and comorbid conditions and prevent exacerbation or deterioration   Update acute care plan with appropriate goals if chronic or comorbid symptoms are exacerbated and prevent overall improvement and discharge  4/27/2024 1611 by González Valencia, RN  Outcome: Progressing   Care plan reviewed with patient.  Patient verbalizes understanding of the plan of care and contribute to goal setting.

## 2024-04-29 ENCOUNTER — OFFICE VISIT (OUTPATIENT)
Dept: PULMONOLOGY | Age: 64
End: 2024-04-29
Payer: MEDICARE

## 2024-04-29 ENCOUNTER — CARE COORDINATION (OUTPATIENT)
Dept: CASE MANAGEMENT | Age: 64
End: 2024-04-29

## 2024-04-29 ENCOUNTER — TELEPHONE (OUTPATIENT)
Dept: CARDIOLOGY CLINIC | Age: 64
End: 2024-04-29

## 2024-04-29 VITALS
HEIGHT: 67 IN | SYSTOLIC BLOOD PRESSURE: 136 MMHG | TEMPERATURE: 98 F | WEIGHT: 157 LBS | OXYGEN SATURATION: 97 % | DIASTOLIC BLOOD PRESSURE: 78 MMHG | BODY MASS INDEX: 24.64 KG/M2 | HEART RATE: 90 BPM

## 2024-04-29 DIAGNOSIS — R05.9 COUGH IN ADULT: ICD-10-CM

## 2024-04-29 DIAGNOSIS — R06.09 EXERTIONAL DYSPNEA: ICD-10-CM

## 2024-04-29 DIAGNOSIS — R05.8 COUGH DUE TO ACE INHIBITOR: ICD-10-CM

## 2024-04-29 DIAGNOSIS — J44.9 CHRONIC OBSTRUCTIVE PULMONARY DISEASE, UNSPECIFIED COPD TYPE (HCC): ICD-10-CM

## 2024-04-29 DIAGNOSIS — J30.9 ALLERGIC RHINITIS, UNSPECIFIED SEASONALITY, UNSPECIFIED TRIGGER: ICD-10-CM

## 2024-04-29 DIAGNOSIS — M15.9 PRIMARY OSTEOARTHRITIS INVOLVING MULTIPLE JOINTS: ICD-10-CM

## 2024-04-29 DIAGNOSIS — J44.9 CHRONIC OBSTRUCTIVE PULMONARY DISEASE, UNSPECIFIED COPD TYPE (HCC): Primary | ICD-10-CM

## 2024-04-29 DIAGNOSIS — Z77.22 TOBACCO SMOKE EXPOSURE: ICD-10-CM

## 2024-04-29 DIAGNOSIS — T46.4X5A COUGH DUE TO ACE INHIBITOR: ICD-10-CM

## 2024-04-29 DIAGNOSIS — J44.1 COPD EXACERBATION (HCC): Primary | ICD-10-CM

## 2024-04-29 LAB — BACTERIA BLD AEROBE CULT: NORMAL

## 2024-04-29 PROCEDURE — 1123F ACP DISCUSS/DSCN MKR DOCD: CPT | Performed by: INTERNAL MEDICINE

## 2024-04-29 PROCEDURE — 99204 OFFICE O/P NEW MOD 45 MIN: CPT | Performed by: INTERNAL MEDICINE

## 2024-04-29 PROCEDURE — 1111F DSCHRG MED/CURRENT MED MERGE: CPT | Performed by: FAMILY MEDICINE

## 2024-04-29 PROCEDURE — 3078F DIAST BP <80 MM HG: CPT | Performed by: INTERNAL MEDICINE

## 2024-04-29 PROCEDURE — 3075F SYST BP GE 130 - 139MM HG: CPT | Performed by: INTERNAL MEDICINE

## 2024-04-29 PROCEDURE — 94618 PULMONARY STRESS TESTING: CPT | Performed by: INTERNAL MEDICINE

## 2024-04-29 RX ORDER — FLUTICASONE PROPIONATE 50 MCG
2 SPRAY, SUSPENSION (ML) NASAL DAILY
Qty: 1 EACH | Refills: 11 | Status: SHIPPED | OUTPATIENT
Start: 2024-04-29 | End: 2025-04-29

## 2024-04-29 RX ORDER — MELOXICAM 15 MG/1
15 TABLET ORAL DAILY
Qty: 30 TABLET | Refills: 0 | Status: SHIPPED | OUTPATIENT
Start: 2024-04-29

## 2024-04-29 NOTE — PATIENT INSTRUCTIONS
mealtime and achieving weight loss are discussed. Avoid ASA, NSAID's, caffeine, peppermints, alcohol and tobacco. Patient should alert me if there are persistent symptoms, dysphagia, weight loss or GI bleeding.   -[place] advised to continue prescribed inhalers and keep good compliance.  - Patient educated to update his pneumococcal vaccine, Covid vaccine, Shingles vaccine, Respiratory Syncytial Virus vaccine with family physician and take influenza vaccine in coming season with out fail. Patient verbalizes understanding.  -He was advised to continue to practice good sleep hygiene practices.  -He was advised to loose weight by controlling diet and doing exercise.  -He was instructed to not to drive any motor vehicles or operate heavy equipment if he feels sleepy.   -Schedule patient [facility] with Dr. Karlos Vanegas MD as soon as possible for further evaluation of left-sided chest pain with exertion and cough.  He had a history of chronic tobacco smoking need to rule out cardiac disease including angina.  -He will be considered for pulm rehab referral at next visit after reviewing his recent pulmonary function tests  -[place] educated about my impression and plan. He verbalizes understanding.   -Schedule patient for follow up with my clinic in 1 month with a full PFTs along with the bronchodilator response and CT scan of sinuses without IV contrast. He was advised to make early appointment if needed.

## 2024-04-29 NOTE — PROGRESS NOTES
Neck Circumference -   16  Mallampati - 3    Lung Nodule Screening     [] Qualifies    [] Does not qualify   [] Declined    [] Completed

## 2024-04-29 NOTE — TELEPHONE ENCOUNTER
This medication refill is regarding a electronic request and telephone. Refill requested by patient along with [facility].    Requested Prescriptions     Pending Prescriptions Disp Refills    meloxicam (MOBIC) 15 MG tablet [Pharmacy Med Name: MELOXICAM 15 MG TABLET] 90 tablet 3     Sig: take 1 tablet by mouth once daily       Date of last visit: 4/11/2024   Date of next visit: 5/7/2024  Date of last refill:  3/27/2023  90/3    Last Lipid Panel:    Lab Results   Component Value Date/Time    CHOL 143 10/04/2023 01:38 PM    TRIG 55 10/04/2023 01:38 PM    HDL 48 10/04/2023 01:38 PM    LDLCALC 84 10/04/2023 01:38 PM     Last CMP:   Lab Results   Component Value Date     04/26/2024    K 4.7 04/26/2024     04/26/2024    CO2 24 04/26/2024    BUN 15 04/26/2024    CREATININE 0.9 04/26/2024    GLUCOSE 98 04/26/2024    CALCIUM 9.2 04/26/2024    PROT 7.8 04/07/2024    BILITOT 0.3 04/07/2024    ALKPHOS 73 04/07/2024    AST 22 04/07/2024    ALT 35 04/07/2024    LABGLOM >90 04/26/2024       Last Thyroid:    Lab Results   Component Value Date    TSH 0.916 10/04/2023     Last Hemoglobin A1C:    Lab Results   Component Value Date/Time    LABA1C 6.1 10/04/2023 01:38 PM       Rx verified, ordered and set to EP.

## 2024-04-29 NOTE — CARE COORDINATION
Care Transitions Note    Initial Call - Call within 2 business days of discharge: Yes     Patient Current Location:  Home: [address] [place] [id number]    Care Transition Nurse contacted the patient by telephone to perform post hospital discharge assessment, verified name and  as identifiers. Provided introduction to self, and explanation of the Care Transition Nurse role.     Patient: South Nettles    Patient : 1960   MRN: [id number]    Reason for Admission: COPD exacerbation   Discharge Date: 24  RURS: Readmission Risk Score: 8      Last Discharge Facility       Date Complaint Diagnosis Description Type Department Provider    24 Shortness of Breath; Abnormal Lab COPD exacerbation (HCC) ... ED to Hosp-Admission (Discharged) (ADMITTED) MARIAH 8AB Castillo Kwong MD; Kadeem Ochoa..            Was this an external facility discharge? No    Additional needs identified to be addressed with provider   No needs identified             Method of communication with provider: none.    Patients top risk factors for readmission: lack of knowledge about disease and medical condition-COPD, HTN    Interventions to address risk factors:   Review of patient management of conditions/medications  Communication with providers: appts scheduled    Care Summary Note:  Spoke with Jatinder, said he feels a lot better.  Still has some congestion/cough, trying to get rid of.  Using home O2 @ 3L.  Saw pulmonary this morning, gave him nasal spray.  Will have some testing done this week.  Reviewed medications/changes.  Picked up nasal spray but Norvasc and Deltasone was not ready.  CTN called pharmacy and are now ready.  Jatinder said his wife just got a text that they are ready, will  today.  Appetite and fluid intake is good.  No issues with B&B.  No other issues to report.  Denies any other needs.  No other questions or concerns at this time.  Will continue to follow.    Care Transition Nurse reviewed discharge

## 2024-04-29 NOTE — TELEPHONE ENCOUNTER
LM for patient to call office back.    Received new patient referral in EPIC for   Scheduling Instructions: Cardiology consult with [facility] Cardiologist Dr. Karlos Vanegas MD as soon as possible for further evaluation of left-sided chest pain especially with exercise which is on and off in nature.  He had a history of chronic tobacco smoking need to evaluate for angina VS cardiac disease.

## 2024-04-29 NOTE — TELEPHONE ENCOUNTER
This medication refill is regarding a electronic request. Refill requested by  [facility] .    Requested Prescriptions     Pending Prescriptions Disp Refills    meloxicam (MOBIC) 15 MG tablet [Pharmacy Med Name: MELOXICAM 15 MG TABLET] 90 tablet 3     Sig: take 1 tablet by mouth once daily       Date of last visit: 4/11/2024   Date of next visit: 5/7/2024  Date of last refill: 3/27/2023   90/3    Last Lipid Panel:    Lab Results   Component Value Date/Time    CHOL 143 10/04/2023 01:38 PM    TRIG 55 10/04/2023 01:38 PM    HDL 48 10/04/2023 01:38 PM    LDLCALC 84 10/04/2023 01:38 PM     Last CMP:   Lab Results   Component Value Date     04/26/2024    K 4.7 04/26/2024     04/26/2024    CO2 24 04/26/2024    BUN 15 04/26/2024    CREATININE 0.9 04/26/2024    GLUCOSE 98 04/26/2024    CALCIUM 9.2 04/26/2024    PROT 7.8 04/07/2024    BILITOT 0.3 04/07/2024    ALKPHOS 73 04/07/2024    AST 22 04/07/2024    ALT 35 04/07/2024    LABGLOM >90 04/26/2024       Last Thyroid:    Lab Results   Component Value Date    TSH 0.916 10/04/2023     Last Hemoglobin A1C:    Lab Results   Component Value Date/Time    LABA1C 6.1 10/04/2023 01:38 PM       Rx verified, ordered and set to EP.

## 2024-04-30 ENCOUNTER — TELEPHONE (OUTPATIENT)
Dept: FAMILY MEDICINE CLINIC | Age: 64
End: 2024-04-30

## 2024-04-30 ENCOUNTER — OFFICE VISIT (OUTPATIENT)
Dept: CARDIOLOGY CLINIC | Age: 64
End: 2024-04-30
Payer: MEDICARE

## 2024-04-30 ENCOUNTER — HOSPITAL ENCOUNTER (OUTPATIENT)
Dept: RESPIRATORY THERAPY | Age: 64
Discharge: HOME | End: 2024-04-30
Payer: MEDICARE

## 2024-04-30 VITALS
WEIGHT: 157 LBS | SYSTOLIC BLOOD PRESSURE: 128 MMHG | BODY MASS INDEX: 24.64 KG/M2 | HEART RATE: 82 BPM | DIASTOLIC BLOOD PRESSURE: 78 MMHG | HEIGHT: 67 IN

## 2024-04-30 DIAGNOSIS — R94.31 ABNORMAL EKG: ICD-10-CM

## 2024-04-30 DIAGNOSIS — I20.89 ANGINA OF EFFORT (HCC): Primary | ICD-10-CM

## 2024-04-30 LAB
BACTERIA BLD AEROBE CULT: NORMAL
BACTERIA BLD AEROBE CULT: NORMAL

## 2024-04-30 PROCEDURE — 3078F DIAST BP <80 MM HG: CPT | Performed by: INTERNAL MEDICINE

## 2024-04-30 PROCEDURE — 3074F SYST BP LT 130 MM HG: CPT | Performed by: INTERNAL MEDICINE

## 2024-04-30 PROCEDURE — 94762 N-INVAS EAR/PLS OXIMTRY CONT: CPT

## 2024-04-30 PROCEDURE — 99204 OFFICE O/P NEW MOD 45 MIN: CPT | Performed by: INTERNAL MEDICINE

## 2024-04-30 PROCEDURE — 1123F ACP DISCUSS/DSCN MKR DOCD: CPT | Performed by: INTERNAL MEDICINE

## 2024-04-30 RX ORDER — ASPIRIN 81 MG/1
81 TABLET ORAL DAILY
Qty: 90 TABLET | Refills: 0 | Status: SHIPPED | OUTPATIENT
Start: 2024-04-30

## 2024-04-30 RX ORDER — OMEPRAZOLE 20 MG/1
20 CAPSULE, DELAYED RELEASE ORAL DAILY
COMMUNITY

## 2024-04-30 RX ORDER — ISOSORBIDE MONONITRATE 30 MG/1
30 TABLET, EXTENDED RELEASE ORAL DAILY
Qty: 30 TABLET | Refills: 3 | Status: SHIPPED | OUTPATIENT
Start: 2024-04-30

## 2024-04-30 NOTE — PATIENT INSTRUCTIONS
Your nurses today were Michaela   Your provider today was Dr. Vanegas  Phone number: [phone]      You may receive a survey regarding the care you received during your visit.  Your input is valuable to us.  We encourage you to complete and return your survey.  We hope you will choose us in the future for your healthcare needs.

## 2024-04-30 NOTE — PROGRESS NOTES
[facility] PHYSICIANS [place] SPECIALTY  [facility] CARDIOLOGY  [address]  [address]  [facility] [address]  Dept: [phone]  Dept Fax: [phone]  Loc: [phone]    Visit Date: 4/30/2024    Mr. Nettles is a 64 y.o. male  who presented for:  New referral  Chest pain, exertional  FH of CAD  HPI:   HPI   South Nettles is a pleasant 64 year old male patient who  has a past medical history of Bilateral inguinal hernia, Chronic back pain, COPD (chronic obstructive pulmonary disease) ([facility]), Cough, Essential hypertension, GERD (gastroesophageal reflux disease), Osteoarthritis, Recurrent left inguinal hernia, and Stomach ulcer. He has h/o tobacco abuse, WILLOUGHBY, COPD (followed by pulmonary medicine). His FH is positive for CAD. His father had CABG/PCI in his 60s. His mother had CABG/PCI in her 60s. His brother had PCI/CABG and an MI in his late 30s. His sister had CABG/PCI in her 40s. He stopped smoking 3/2024. The patient was referred for cardiac evaluation due chest pain, WILLOUGHBY. He reports chest pains as intermittent, retrosternal, pressure like, radiates to the left arm, associated with left hand tingling.       Current Outpatient Medications:     meloxicam (MOBIC) 15 MG tablet, take 1 tablet by mouth once daily, Disp: 30 tablet, Rfl: 0    fluticasone (FLONASE) 50 MCG/ACT nasal spray, 2 sprays by Each Nostril route daily, Disp: 1 each, Rfl: 11    amLODIPine (NORVASC) 5 MG tablet, Take 1 tablet by mouth daily, Disp: 30 tablet, Rfl: 0    predniSONE (DELTASONE) 20 MG tablet, Take 2 tablets by mouth daily for 3 days, Disp: 6 tablet, Rfl: 0    ipratropium 0.5 mg-albuterol 2.5 mg (DUONEB) 0.5-2.5 (3) MG/3ML SOLN nebulizer solution, Inhale 3 mLs into the lungs every 6 hours as needed for Shortness of Breath, Disp: 360 mL, Rfl: 0    Respiratory Therapy Supplies (NEBULIZER/TUBING/MOUTHPIECE) KIT, 1 kit by Does not apply route in the morning, at noon, in the evening, and at bedtime, Disp: 1 kit, Rfl: 0

## 2024-04-30 NOTE — PROGRESS NOTES
Instructions were given for an overnight nocturnal pulse oximetry study. The assigned GBA number of the pulse oximetry was .  A log sheet was completed. Patient was instructed on documenting any events that occurred throughout the night on the log sheet. The procedure was explained to the learner(s). Patient understanding of the procedure was good.     Patient does have a mean of transportation to bring back the study the next day. A patient task was placed in the patient’s chart for the  to download the nocturnal study and fax the results to the ordering provider for interpretation. The pulse oximetry’s memory was cleared. Patient had no questions and was sent home with the pulse oximeter.

## 2024-04-30 NOTE — TELEPHONE ENCOUNTER
[facility] Transitions Initial Follow Up Call    Outreach made within 2 business days of discharge: Yes    Patient: South Nettles Patient : 1960   MRN: [id number]  Reason for Admission: There are no discharge diagnoses documented for the most recent discharge.  Discharge Date: 24       Spoke with: WADE    Discharge department/facility: [facility] Interactive Patient Contact:    Scheduled appointment with PCP within 7-14 days    Follow Up  Future Appointments   Date Time Provider Department Center   2024 12:30 PM Karlos Vanegas MD N SRPX Heart [facility] [place]   2024  2:00 PM STR PULMONARY FUNCTION ROOM 3 STRZ RESP [place] [facility]   2024  4:20 PM STR CT IMAGING RM1  [facility] STRZ OUT EXP STR Rad/Card   2024 12:30 PM STR PULMONARY FUNCTION ROOM 1 STRZ PFT [place] [facility]   2024 10:40 AM Marsha Ireland MD SRPX DELPHOS [facility] [place]   2024 12:00 PM Lali Madera MD N Pulm Med [facility]       Stanislaw Lamas MA

## 2024-05-01 ENCOUNTER — HOSPITAL ENCOUNTER (OUTPATIENT)
Dept: CT IMAGING | Age: 64
Discharge: HOME | End: 2024-05-01
Attending: INTERNAL MEDICINE
Payer: MEDICARE

## 2024-05-01 DIAGNOSIS — R06.09 EXERTIONAL DYSPNEA: ICD-10-CM

## 2024-05-01 DIAGNOSIS — Z77.22 TOBACCO SMOKE EXPOSURE: ICD-10-CM

## 2024-05-01 DIAGNOSIS — R05.8 COUGH DUE TO ACE INHIBITOR: ICD-10-CM

## 2024-05-01 DIAGNOSIS — J44.9 CHRONIC OBSTRUCTIVE PULMONARY DISEASE, UNSPECIFIED COPD TYPE (HCC): ICD-10-CM

## 2024-05-01 DIAGNOSIS — R05.9 COUGH IN ADULT: ICD-10-CM

## 2024-05-01 DIAGNOSIS — T46.4X5A COUGH DUE TO ACE INHIBITOR: ICD-10-CM

## 2024-05-01 DIAGNOSIS — J30.9 ALLERGIC RHINITIS, UNSPECIFIED SEASONALITY, UNSPECIFIED TRIGGER: ICD-10-CM

## 2024-05-01 PROCEDURE — 70486 CT MAXILLOFACIAL W/O DYE: CPT

## 2024-05-02 ENCOUNTER — HOSPITAL ENCOUNTER (OUTPATIENT)
Dept: PULMONOLOGY | Age: 64
Discharge: HOME | End: 2024-05-02
Attending: INTERNAL MEDICINE
Payer: MEDICARE

## 2024-05-02 DIAGNOSIS — J44.9 CHRONIC OBSTRUCTIVE PULMONARY DISEASE, UNSPECIFIED COPD TYPE (HCC): ICD-10-CM

## 2024-05-02 DIAGNOSIS — T46.4X5A COUGH DUE TO ACE INHIBITOR: ICD-10-CM

## 2024-05-02 DIAGNOSIS — R05.8 COUGH DUE TO ACE INHIBITOR: ICD-10-CM

## 2024-05-02 DIAGNOSIS — Z77.22 TOBACCO SMOKE EXPOSURE: ICD-10-CM

## 2024-05-02 DIAGNOSIS — R05.9 COUGH IN ADULT: ICD-10-CM

## 2024-05-02 DIAGNOSIS — R06.09 EXERTIONAL DYSPNEA: ICD-10-CM

## 2024-05-02 DIAGNOSIS — J30.9 ALLERGIC RHINITIS, UNSPECIFIED SEASONALITY, UNSPECIFIED TRIGGER: ICD-10-CM

## 2024-05-02 PROCEDURE — 94060 EVALUATION OF WHEEZING: CPT

## 2024-05-02 PROCEDURE — 94729 DIFFUSING CAPACITY: CPT

## 2024-05-02 PROCEDURE — 94726 PLETHYSMOGRAPHY LUNG VOLUMES: CPT

## 2024-05-03 ENCOUNTER — CARE COORDINATION (OUTPATIENT)
Dept: CASE MANAGEMENT | Age: 64
End: 2024-05-03

## 2024-05-03 NOTE — CARE COORDINATION
Care Transitions Note    Follow Up Call      Attempted to reach patient for transitions of care follow up.  Unable to reach patient.      Outreach Attempts:   HIPAA compliant voicemail left for patient, spouse/partner .       Follow Up Appointment:   Future Appointments         Provider Specialty Dept Phone    5/7/2024 10:40 AM Marsha Ireland MD Family Medicine [phone]    5/7/2024 12:00 PM Lali Madera MD Pulmonology [phone]    5/15/2024 8:45 AM (Arrive by 8:30 AM) STR ECHO 2 Cardiology [phone]    5/15/2024 9:30 AM (Arrive by 9:00 AM) STR NUC MED HC  INJECT  RM1 Radiology [phone]    5/15/2024 10:00 AM (Arrive by 9:30 AM) STR NUCLEAR MEDICINE HEART CENTER ROOM 1 Radiology [phone]    5/15/2024 10:30 AM (Arrive by 10:15 AM) STR STRESS LAB 1 Cardiology [phone]    5/15/2024 11:00 AM (Arrive by 10:30 AM) STR NUCLEAR MEDICINE HEART CENTER ROOM 1 Radiology [phone]    4/30/2025 1:30 PM Karlos Vanegas MD Cardiology [phone]            Plan for follow-up call in 2-5 days based on severity of symptoms and risk factors. Plan for next call: symptom management-new or worsening symptoms, COPD  follow-up appointment-review f/u 4/30, 5/7 any med changes?    Sera Quinn RN

## 2024-05-03 NOTE — TELEPHONE ENCOUNTER
Care Transitions Initial Follow Up Call    Outreach made within 2 business days of discharge: Yes    Patient: South Nettles Patient : 1960   MRN: [id number]  Reason for Admission: There are no discharge diagnoses documented for the most recent discharge.  Discharge Date: 24       Spoke with: Jatinder    Discharge department/facility: [facility]    TCM Interactive Patient Contact:  Was patient able to fill all prescriptions: Yes  Was patient instructed to bring all medications to the follow-up visit: Yes  Is patient taking all medications as directed in the discharge summary? Yes  Does patient understand their discharge instructions: Yes  Does patient have questions or concerns that need addressed prior to 7-14 day follow up office visit: no    Scheduled appointment with PCP within 7-14 days    Follow Up  Future Appointments   Date Time Provider Department Center   2024 10:40 AM Marsha Ireland MD SRPX [facility]OS [facility] [place]   2024 12:00 PM Lali Madera MD N Pulm Med [facility] - [place]   5/15/2024  8:45 AM STR ECHO 2 STRZ [place] STR Rad/Card   5/15/2024  9:30 AM STR NUC MED HC  INJECT  RM1 STRZ NUC MED STR Rad/Card   5/15/2024 10:00 AM STR NUCLEAR MEDICINE HEART CENTER ROOM 1 STRZ NUC MED STR Rad/Card   5/15/2024 10:30 AM STR STRESS LAB 1 STRZ [place] STR Rad/Card   5/15/2024 11:00 AM STR NUCLEAR MEDICINE HEART CENTER ROOM 1 STRZ NUC MED STR Rad/Card   2025  1:30 PM Karlos Vanegas MD N SRPX Heart [facility] [place]       Ramona Tompkins MA

## 2024-05-07 ENCOUNTER — OFFICE VISIT (OUTPATIENT)
Dept: FAMILY MEDICINE CLINIC | Age: 64
End: 2024-05-07

## 2024-05-07 ENCOUNTER — OFFICE VISIT (OUTPATIENT)
Dept: PULMONOLOGY | Age: 64
End: 2024-05-07
Payer: MEDICARE

## 2024-05-07 VITALS
WEIGHT: 160.13 LBS | TEMPERATURE: 96.9 F | HEART RATE: 105 BPM | BODY MASS INDEX: 25.13 KG/M2 | SYSTOLIC BLOOD PRESSURE: 126 MMHG | RESPIRATION RATE: 16 BRPM | HEIGHT: 67 IN | OXYGEN SATURATION: 96 % | DIASTOLIC BLOOD PRESSURE: 76 MMHG

## 2024-05-07 VITALS
HEIGHT: 66 IN | WEIGHT: 158.6 LBS | OXYGEN SATURATION: 98 % | BODY MASS INDEX: 25.49 KG/M2 | DIASTOLIC BLOOD PRESSURE: 72 MMHG | SYSTOLIC BLOOD PRESSURE: 120 MMHG | TEMPERATURE: 97.1 F | HEART RATE: 81 BPM

## 2024-05-07 DIAGNOSIS — I10 PRIMARY HYPERTENSION: ICD-10-CM

## 2024-05-07 DIAGNOSIS — G47.10 HYPERSOMNIA: ICD-10-CM

## 2024-05-07 DIAGNOSIS — R05.3 POST-COVID CHRONIC COUGH: ICD-10-CM

## 2024-05-07 DIAGNOSIS — I20.9 ANGINA PECTORIS, UNSPECIFIED (HCC): ICD-10-CM

## 2024-05-07 DIAGNOSIS — R25.2 MUSCLE CRAMPS: ICD-10-CM

## 2024-05-07 DIAGNOSIS — R29.818 SUSPECTED SLEEP APNEA: ICD-10-CM

## 2024-05-07 DIAGNOSIS — M54.50 CHRONIC MIDLINE LOW BACK PAIN WITHOUT SCIATICA: ICD-10-CM

## 2024-05-07 DIAGNOSIS — U09.9 POST-COVID CHRONIC COUGH: ICD-10-CM

## 2024-05-07 DIAGNOSIS — J44.9 STAGE 1 MILD COPD BY GOLD CLASSIFICATION (HCC): Primary | ICD-10-CM

## 2024-05-07 DIAGNOSIS — G89.29 CHRONIC MIDLINE LOW BACK PAIN WITHOUT SCIATICA: ICD-10-CM

## 2024-05-07 DIAGNOSIS — F17.200 TOBACCO USE DISORDER: ICD-10-CM

## 2024-05-07 DIAGNOSIS — R06.83 SNORING: ICD-10-CM

## 2024-05-07 DIAGNOSIS — K21.9 GASTROESOPHAGEAL REFLUX DISEASE WITHOUT ESOPHAGITIS: ICD-10-CM

## 2024-05-07 DIAGNOSIS — R06.81 APNEA: ICD-10-CM

## 2024-05-07 DIAGNOSIS — J44.9 COPD, MILD (HCC): Primary | ICD-10-CM

## 2024-05-07 PROBLEM — I25.119 ATHEROSCLEROTIC HEART DISEASE OF NATIVE CORONARY ARTERY WITH UNSPECIFIED ANGINA PECTORIS (HCC): Status: ACTIVE | Noted: 2024-05-07

## 2024-05-07 PROCEDURE — 3074F SYST BP LT 130 MM HG: CPT | Performed by: INTERNAL MEDICINE

## 2024-05-07 PROCEDURE — 1123F ACP DISCUSS/DSCN MKR DOCD: CPT | Performed by: INTERNAL MEDICINE

## 2024-05-07 PROCEDURE — 3078F DIAST BP <80 MM HG: CPT | Performed by: INTERNAL MEDICINE

## 2024-05-07 PROCEDURE — 99214 OFFICE O/P EST MOD 30 MIN: CPT | Performed by: INTERNAL MEDICINE

## 2024-05-07 RX ORDER — LISINOPRIL 30 MG/1
30 TABLET ORAL DAILY
Qty: 90 TABLET | Refills: 3 | Status: SHIPPED | OUTPATIENT
Start: 2024-05-07 | End: 2025-05-02

## 2024-05-07 RX ORDER — ALBUTEROL SULFATE 2.5 MG/3ML
2.5 SOLUTION RESPIRATORY (INHALATION) EVERY 6 HOURS PRN
Qty: 120 EACH | Refills: 11 | Status: SHIPPED | OUTPATIENT
Start: 2024-05-07 | End: 2025-05-07

## 2024-05-07 RX ORDER — AMLODIPINE BESYLATE 5 MG/1
5 TABLET ORAL DAILY
Qty: 90 TABLET | Refills: 3 | Status: SHIPPED | OUTPATIENT
Start: 2024-05-07

## 2024-05-07 RX ORDER — ALBUTEROL SULFATE 90 UG/1
2 AEROSOL, METERED RESPIRATORY (INHALATION) 4 TIMES DAILY PRN
Qty: 8 G | Refills: 5 | Status: SHIPPED | OUTPATIENT
Start: 2024-05-07

## 2024-05-07 NOTE — PROGRESS NOTES
Post-Discharge Transitional Care  Follow Up      South Nettles   YOB: 1960    Date of Office Visit:  5/7/2024  Date of Hospital Admission: 4/25/24  Date of Hospital Discharge: 4/28/24  Risk of hospital readmission (high >=14%. Medium >=10%) :Readmission Risk Score: 8      Care management risk score Rising risk (score 2-5) and Complex Care (Scores >=6): No Risk Score On File     Non face to face  following discharge, date last encounter closed (first attempt may have been earlier): 04/30/2024    Call initiated 2 business days of discharge: Yes    ASSESSMENT/PLAN:   Acute hypoxemic respiratory failure (HCC)  Primary hypertension  -     Comprehensive Metabolic Panel; Future  -     Vitamin B12; Future  -     lisinopril (PRINIVIL;ZESTRIL) 30 MG tablet; Take 1 tablet by mouth daily, Disp-90 tablet, R-3Normal  -     amLODIPine (NORVASC) 5 MG tablet; Take 1 tablet by mouth daily, Disp-90 tablet, R-3Normal  Gastroesophageal reflux disease without esophagitis  -     Comprehensive Metabolic Panel; Future  -     Vitamin B12; Future  Chronic low back pain, unspecified back pain laterality, unspecified whether sciatica present  Suspected sleep apnea  Post-COVID chronic cough  -     albuterol sulfate HFA (VENTOLIN HFA) 108 (90 Base) MCG/ACT inhaler; Inhale 2 puffs into the lungs 4 times daily as needed for Wheezing, Disp-8 g, R-5Normal  Tobacco use disorder  -     albuterol sulfate HFA (VENTOLIN HFA) 108 (90 Base) MCG/ACT inhaler; Inhale 2 puffs into the lungs 4 times daily as needed for Wheezing, Disp-8 g, R-5Normal  Muscle cramps  -     Comprehensive Metabolic Panel; Future  COPD, mild (HCC)    Continue tobacco cessation  Patient states his cough has improved, discussed with patient about lisinopril induced cough, with shared decision making patient would like to continue lisinopril and amlodipine to manage his blood pressure as it provides better control.  Monitor for worsening cough and BP.  Patient would

## 2024-05-07 NOTE — PROGRESS NOTES
Attending Physician Statement  I have discussed the case, including pertinent history and exam findings with the resident. I also have seen the patient and performed key portions of the examination. I agree with the documented assessment and plan as documented by the resident.      South Nettles (:  1960) is a 64 y.o. male,Established patient, here for evaluation of the following chief complaint(s):  Follow-Up from Hospital (COPD. )      Assessment & Plan   ASSESSMENT/PLAN:  1. COPD, mild (HCC)  2. Primary hypertension  -     Comprehensive Metabolic Panel; Future  -     Vitamin B12; Future  -     lisinopril (PRINIVIL;ZESTRIL) 30 MG tablet; Take 1 tablet by mouth daily, Disp-90 tablet, R-3Normal  -     amLODIPine (NORVASC) 5 MG tablet; Take 1 tablet by mouth daily, Disp-90 tablet, R-3Normal  3. Gastroesophageal reflux disease without esophagitis  -     Comprehensive Metabolic Panel; Future  -     Vitamin B12; Future  4. Suspected sleep apnea  5. Post-COVID chronic cough  -     albuterol sulfate HFA (VENTOLIN HFA) 108 (90 Base) MCG/ACT inhaler; Inhale 2 puffs into the lungs 4 times daily as needed for Wheezing, Disp-8 g, R-5Normal  6. Tobacco use disorder  -     albuterol sulfate HFA (VENTOLIN HFA) 108 (90 Base) MCG/ACT inhaler; Inhale 2 puffs into the lungs 4 times daily as needed for Wheezing, Disp-8 g, R-5Normal  7. Muscle cramps  -     Comprehensive Metabolic Panel; Future  8. Chronic midline low back pain without sciatica    Patient doing better.  Smoking cessation efforts ongoing.  He is wanting to keep lisinopril onboard as he believes ACE-I is not cause of his cough.  Will monitor.   Refills as above, tolerating well  Noted elevated HR, monitor   Good hydration  Short-term nsaid use for back pain  Daily inhaler recommended; discussed options and how they help reduce flare ups-- spiriva vs incruse vs anoro.  Sees pulmo today    Return in about 5 months (around 10/7/2024) for AWV .         Subjective

## 2024-05-07 NOTE — PROGRESS NOTES
Neck Circumference -   16  Mallampati - 3    Lung Nodule Screening     [] Qualifies    [] Does not qualify   [] Declined    [] Completed  
staying at home.    He denies any history of Glucoma or urinary retention in the past    Sleep medicine HPI/Evaluation:    Usual time to go to bed during the work/regular day of week: 9:30 PM to 11 PM  Usual time to wake up during the work//regular day of week: 3 AM to 5 AM    Sleep apnea symptoms:  Noticed to have loud snoring: Yes-by his wife  Witnessed apneas during sleep noticed: Yes-by his wife  History of choking and gasping sensation at night time: Yes-by his wife  History of headaches in the morning:Yes-sometimes  History of dry mouth in the morning: Yes-sometimes  History of palpitations during night time/nocturnal awakenings: No  History of sweating during night time/nocturnal awakenings: Yes       General:  History of head injury in the past: Yes- A 2 x 4 wood piece fell down on his head leading to 9 stitches.  History of seizures: No  Rest less legs syndrome symptoms:NO  History suggestive of periodic limb movements during sleep: NO  History suggestive of hypnagogic hallucinations: NO  History suggestive of hypnopompic hallucinations: NO  History suggestive of sleep talking:NO  History suggestive of sleep walking:NO  History suggestive of bruxism: Yes            [x] No mouth guard.  He lost his teeth.  He is currently using dentures.  He takes off his dentures during sleep.  History suggestive of cataplexy: NO  History suggestive of sleep paralysis: NO    History regarding old sleep studies:  Prior history of sleep study: No  Using CPAP device: No  Currently using home Oxygen: NO.       Patient considerations:  Is the patient is ambulatory: Yes  Patient is currently using: None of these Wheelchair, Walker or Cane.  Para/Quadriplegic: NO  Hearing deficit : NO  Claustrophobic: NO  MDD : NO  Blind: NO  Incontinent: NO  Para/Quadraplegi: NO.   Need transportation to and from Sleep Center:NO    [place] Sleepiness Score:   Sitting and reading:3  Watching TV:3  Sitting inactive in a public place:3  Being a

## 2024-05-07 NOTE — PATIENT INSTRUCTIONS
Recommendations/Plan:  -Will start patient on Anora 1puff daily in am. [place]les educated and demonstrated by me in my office how to use Anora. Patient verbalizes understanding. He was detailed about mechanism of action of drug along with associated side effects. He agreed to take the risk and medication. He verbalizes understanding.  -He was advised to stop taking Lisinopril due to her chronic cough ( May be side effect from ACE inhibitor i.e Lisinopril) after discussing with his family physiciant. His family physician need to start on alternate medication with out cough as side effect for optimal control of blood pressure.  -He was advised to discontinue home oxygen during rest, exercise and during nighttime.    -Schedule patient for nocturnal polysomnogram (Sleep study) at [facility] sleep lab. Patient educated to not to drive any motor vehicles or operate heavy equipment until sleep symptoms are under control. Patient verbalizes understanding. Patient to follow with my clinic at [facility] slee clinic 1week after sleep study.  -Continue albuterol HFA 2 puffs every 6 hours as needed  -Stop DuoNebs 3ml via nebulization.  -He instructed to use albuterol inhaler 2 puffs Q6h prn or albuterol 2.5 mg nebs Q6h prn (the nebulizer) at one time as rescue medication not both at the same time. He verbalizes understanding.   -Follow Mtbs-2-Bwshgomruon swab-it is still pending.  -He was advised to continue to practice antireflux measures  such as raising the head of the bed, avoiding tight clothing or belts, avoiding eating late at night and not lying down shortly after mealtime and achieving weight loss are discussed. Avoid ASA, NSAID's, caffeine, peppermints, alcohol and tobacco. Patient should alert me if there are persistent symptoms, dysphagia, weight loss or GI bleeding.   -[place] advised to continue prescribed inhalers and keep good compliance.  - Patient educated to update his pneumococcal vaccine, Covid

## 2024-05-08 ENCOUNTER — CARE COORDINATION (OUTPATIENT)
Dept: CASE MANAGEMENT | Age: 64
End: 2024-05-08

## 2024-05-08 NOTE — CARE COORDINATION
Care Transitions Follow Up Call    Patient Current Location:  Home: [address] [place], [place] [id number]    [facility] Care Coordinator contacted the patient by telephone to follow up after admission on .  Verified name and  with patient as identifiers.    Patient: South Nettles  Patient : 1960   MRN: <[id number]>  Reason for Admission: Acute respiratory failure with hypoxia (HCC)   Discharge Date: 24 RARS: Readmission Risk Score: 8      Needs to be reviewed by the provider   Additional needs identified to be addressed with provider: No  none             Method of communication with provider: none.    Spoke  with [place] who stated that since being out of the hospital he has been doing well. Patient denied cp, sob, cough, dizziness, headache, n/v, diarrhea, abdominal pains, fever, or chills. Patient report that appetite and fluid intake is good and denied any problems with bowel or bladder. Patient reported that he is taking all medications as ordered. Patient denied any other needs at this time. Patient instructed to continue to monitor s/s, reporting any that may present to MD immediately for early intervention.  Patient is agreeable to f/u calls.     Addressed changes since last contact:  none  Discussed follow-up appointments. If no appointment was previously scheduled, appointment scheduling offered: Yes.   Is follow up appointment scheduled within 7 days of discharge? Yes.    Follow Up  Future Appointments   Date Time Provider Department Center   5/15/2024  8:45 AM STR ECHO 2 STRZ [place] STR Rad/Card   5/15/2024  9:30 AM STR NUC MED HC  INJECT  RM1 STRZ NUC MED STR Rad/Card   5/15/2024 10:00 AM STR NUCLEAR MEDICINE HEART CENTER ROOM 1 STRZ NUC MED STR Rad/Card   5/15/2024 10:30 AM STR STRESS LAB 1 STRZ [place] STR Rad/Card   5/15/2024 11:00 AM STR NUCLEAR MEDICINE HEART CENTER ROOM 1 STRZ NUC MED STR Rad/Card   2024  8:00 PM SCHEDULE, STR SLEEP TECH 01 STRZ SLEEP [place] [facility]   2024

## 2024-05-08 NOTE — CARE COORDINATION
[facility] Care Transitions Follow Up Call    2024    Patient: South Nettles  Patient : 1960   MRN: <[id number]>  Reason for Admission: Acute respiratory failure with hypoxia (HCC)   Discharge Date: 24 RARS: Readmission Risk Score: 8         Spoke with: urmila    [facility] up [facility]. Left HIPPA compliant message and contact information for call back.     Care Transitions Subsequent and Final Call    Subsequent and Final Calls  Care Transitions Interventions     Transportation Support: Declined   Other Interventions:             Follow Up  Future Appointments   Date Time Provider Department Center   5/15/2024  8:45 AM STR ECHO 2 STRZ [place] STR Rad/Card   5/15/2024  9:30 AM STR NUC MED HC  INJECT  RM1 STRZ NUC MED STR Rad/Card   5/15/2024 10:00 AM STR NUCLEAR [facility] HEART [place] ROOM 1 STRZ NUC MED STR Rad/Card   5/15/2024 10:30 AM STR STRESS LAB 1 STRZ [place] STR Rad/Card   5/15/2024 11:00 AM STR [facility] ROOM 1 STRZ NUC MED STR Rad/Card   2024  8:00 PM SCHEDULE, STR SLEEP TECH 01 STRZ SLEEP [place] HOD   2024 10:20 AM Neema Solomon APRN - CNP N Pulm Med [facility] [place]   9/3/2024 10:00 AM Lali Madera MD N Pulm Med [facility] [place]   10/8/2024 10:20 AM Marsha Ireland MD SRPX [facility] [place]   2025  1:30 PM Karlos Vanegas MD N SRPX Heart [facility] [place]       Vida Jacobs LPN

## 2024-05-09 ENCOUNTER — HOSPITAL ENCOUNTER (OUTPATIENT)
Age: 64
Discharge: HOME | End: 2024-05-09
Payer: MEDICARE

## 2024-05-09 DIAGNOSIS — K21.9 GASTROESOPHAGEAL REFLUX DISEASE WITHOUT ESOPHAGITIS: ICD-10-CM

## 2024-05-09 DIAGNOSIS — R25.2 MUSCLE CRAMPS: ICD-10-CM

## 2024-05-09 DIAGNOSIS — I10 PRIMARY HYPERTENSION: ICD-10-CM

## 2024-05-09 LAB
ALBUMIN SERPL BCG-MCNC: 4.1 G/DL (ref 3.5–5.1)
ALP SERPL-CCNC: 69 U/L (ref 38–126)
ALT SERPL W/O P-5'-P-CCNC: 20 U/L (ref 11–66)
ANION GAP SERPL CALC-SCNC: 15 MEQ/L (ref 8–16)
AST SERPL-CCNC: 13 U/L (ref 5–40)
BILIRUB SERPL-MCNC: 0.3 MG/DL (ref 0.3–1.2)
BUN SERPL-MCNC: 21 MG/DL (ref 7–22)
CALCIUM SERPL-MCNC: 9.7 MG/DL (ref 8.5–10.5)
CHLORIDE SERPL-SCNC: 97 MEQ/L (ref 98–111)
CO2 SERPL-SCNC: 22 MEQ/L (ref 23–33)
CREAT SERPL-MCNC: 1.1 MG/DL (ref 0.4–1.2)
GFR SERPL CREATININE-BSD FRML MDRD: 74 ML/MIN/1.73M2
GLUCOSE SERPL-MCNC: 101 MG/DL (ref 70–108)
POTASSIUM SERPL-SCNC: 4.6 MEQ/L (ref 3.5–5.2)
PROT SERPL-MCNC: 7.3 G/DL (ref 6.1–8)
SODIUM SERPL-SCNC: 134 MEQ/L (ref 135–145)
VIT B12 SERPL-MCNC: 753 PG/ML (ref 211–911)

## 2024-05-09 PROCEDURE — 82607 VITAMIN B-12: CPT

## 2024-05-09 PROCEDURE — 80053 COMPREHEN METABOLIC PANEL: CPT

## 2024-05-09 PROCEDURE — 36415 COLL VENOUS BLD VENIPUNCTURE: CPT

## 2024-05-10 NOTE — RESULT ENCOUNTER NOTE
Please let patient know that his laboratories are overall okay except his sodium, chloride and carbon dioxide are just slightly low.  This may be related to medications and hydration status.  Will monitor for now; I encourage good hydration.  thank you.

## 2024-05-11 PROBLEM — K21.9 GASTROESOPHAGEAL REFLUX DISEASE WITHOUT ESOPHAGITIS: Status: ACTIVE | Noted: 2024-05-11

## 2024-05-11 PROBLEM — J96.01 ACUTE RESPIRATORY FAILURE WITH HYPOXIA (HCC): Status: RESOLVED | Noted: 2024-04-25 | Resolved: 2024-05-11

## 2024-05-11 PROBLEM — J96.01 ACUTE HYPOXEMIC RESPIRATORY FAILURE (HCC): Status: RESOLVED | Noted: 2024-04-07 | Resolved: 2024-05-11

## 2024-05-11 PROBLEM — J44.1 COPD EXACERBATION (HCC): Status: RESOLVED | Noted: 2024-04-07 | Resolved: 2024-05-11

## 2024-05-11 PROBLEM — Z78.9 FAILURE OF OUTPATIENT TREATMENT: Status: RESOLVED | Noted: 2024-04-07 | Resolved: 2024-05-11

## 2024-05-11 PROBLEM — G89.29 CHRONIC LOW BACK PAIN: Status: ACTIVE | Noted: 2024-05-11

## 2024-05-11 PROBLEM — M54.50 CHRONIC LOW BACK PAIN: Status: ACTIVE | Noted: 2024-05-11

## 2024-05-11 PROBLEM — F17.200 TOBACCO USE DISORDER: Status: ACTIVE | Noted: 2024-05-11

## 2024-05-11 PROBLEM — J44.9 COPD, MILD (HCC): Status: ACTIVE | Noted: 2024-05-11

## 2024-05-13 ENCOUNTER — TELEPHONE (OUTPATIENT)
Dept: FAMILY MEDICINE CLINIC | Age: 64
End: 2024-05-13

## 2024-05-13 NOTE — TELEPHONE ENCOUNTER
Patient notified  Discussed importance of staying hydrated  States he drinks 2-3 cups of coffee, 2-3 cups of tea, pepsi and beer but not much water  He will try to drink more water

## 2024-05-13 NOTE — TELEPHONE ENCOUNTER
----- Message from Marsha Ireland MD sent at 5/10/2024  5:42 PM EDT -----  Please let patient know that his laboratories are overall okay except his sodium, chloride and carbon dioxide are just slightly low.  This may be related to medications and hydration status.  Will monitor for now; I encourage good hydration.  thank you.

## 2024-05-15 ENCOUNTER — HOSPITAL ENCOUNTER (OUTPATIENT)
Dept: NUCLEAR MEDICINE | Age: 64
Discharge: HOME | End: 2024-05-15
Attending: INTERNAL MEDICINE
Payer: MEDICARE

## 2024-05-15 ENCOUNTER — HOSPITAL ENCOUNTER (OUTPATIENT)
Age: 64
Discharge: HOME | End: 2024-05-17
Attending: INTERNAL MEDICINE
Payer: MEDICARE

## 2024-05-15 ENCOUNTER — TELEPHONE (OUTPATIENT)
Dept: CARDIOLOGY CLINIC | Age: 64
End: 2024-05-15

## 2024-05-15 VITALS — HEIGHT: 66 IN | WEIGHT: 157 LBS | BODY MASS INDEX: 25.23 KG/M2

## 2024-05-15 DIAGNOSIS — R94.31 ABNORMAL EKG: ICD-10-CM

## 2024-05-15 DIAGNOSIS — R94.39 ABNORMAL STRESS TEST: Primary | ICD-10-CM

## 2024-05-15 DIAGNOSIS — I20.89 ANGINA OF EFFORT (HCC): ICD-10-CM

## 2024-05-15 LAB
ECHO AO ASC DIAM: 2.7 CM
ECHO AO ASCENDING AORTA INDEX: 1.51 CM/M2
ECHO AV CUSP MM: 1.7 CM
ECHO AV PEAK GRADIENT: 10 MMHG
ECHO AV PEAK VELOCITY: 1.6 M/S
ECHO AV VELOCITY RATIO: 0.63
ECHO BSA: 1.81 M2
ECHO BSA: 1.81 M2
ECHO EST RA PRESSURE: 5 MMHG
ECHO LA AREA 2C: 8.9 CM2
ECHO LA AREA 4C: 8.1 CM2
ECHO LA DIAMETER INDEX: 1.79 CM/M2
ECHO LA DIAMETER: 3.2 CM
ECHO LA MAJOR AXIS: 3.9 CM
ECHO LA MINOR AXIS: 3.8 CM
ECHO LA VOL BP: 15 ML (ref 18–58)
ECHO LA VOL MOD A2C: 17 ML (ref 18–58)
ECHO LA VOL MOD A4C: 13 ML (ref 18–58)
ECHO LA VOL/BSA BIPLANE: 8 ML/M2 (ref 16–34)
ECHO LA VOLUME INDEX MOD A2C: 9 ML/M2 (ref 16–34)
ECHO LA VOLUME INDEX MOD A4C: 7 ML/M2 (ref 16–34)
ECHO LV E' LATERAL VELOCITY: 10 CM/S
ECHO LV E' SEPTAL VELOCITY: 6 CM/S
ECHO LV FRACTIONAL SHORTENING: 30 % (ref 28–44)
ECHO LV INTERNAL DIMENSION DIASTOLE INDEX: 2.63 CM/M2
ECHO LV INTERNAL DIMENSION DIASTOLIC: 4.7 CM (ref 4.2–5.9)
ECHO LV INTERNAL DIMENSION SYSTOLIC INDEX: 1.84 CM/M2
ECHO LV INTERNAL DIMENSION SYSTOLIC: 3.3 CM
ECHO LV ISOVOLUMETRIC RELAXATION TIME (IVRT): 74 MS
ECHO LV IVSD: 0.8 CM (ref 0.6–1)
ECHO LV MASS 2D: 122.3 G (ref 88–224)
ECHO LV MASS INDEX 2D: 68.3 G/M2 (ref 49–115)
ECHO LV POSTERIOR WALL DIASTOLIC: 0.8 CM (ref 0.6–1)
ECHO LV RELATIVE WALL THICKNESS RATIO: 0.34
ECHO LVOT PEAK GRADIENT: 4 MMHG
ECHO LVOT PEAK VELOCITY: 1 M/S
ECHO MV A VELOCITY: 0.94 M/S
ECHO MV E DECELERATION TIME (DT): 264 MS
ECHO MV E VELOCITY: 0.71 M/S
ECHO MV E/A RATIO: 0.76
ECHO MV E/E' LATERAL: 7.1
ECHO MV E/E' RATIO (AVERAGED): 9.47
ECHO MV E/E' SEPTAL: 11.83
ECHO PULMONARY ARTERY END DIASTOLIC PRESSURE: 6 MMHG
ECHO PV MAX VELOCITY: 0.7 M/S
ECHO PV PEAK GRADIENT: 2 MMHG
ECHO PV REGURGITANT MAX VELOCITY: 1.3 M/S
ECHO RIGHT VENTRICULAR SYSTOLIC PRESSURE (RVSP): 26 MMHG
ECHO RV INTERNAL DIMENSION: 2.1 CM
ECHO RV TAPSE: 2 CM (ref 1.7–?)
ECHO TV E WAVE: 0.6 M/S
ECHO TV REGURGITANT MAX VELOCITY: 2.31 M/S
ECHO TV REGURGITANT PEAK GRADIENT: 21 MMHG
NUC STRESS EJECTION FRACTION: 68 %
STRESS BASELINE DIAS BP: 67 MMHG
STRESS BASELINE HR: 71 BPM
STRESS BASELINE SYS BP: 121 MMHG
STRESS STAGE 1 BP: NORMAL MMHG
STRESS STAGE 1 DURATION: 1 MIN:SEC
STRESS STAGE 1 HR: 75 BPM
STRESS STAGE 2 BP: NORMAL MMHG
STRESS STAGE 2 DURATION: 1 MIN:SEC
STRESS STAGE 2 HR: 96 BPM
STRESS STAGE 3 BP: NORMAL MMHG
STRESS STAGE 3 DURATION: 1 MIN:SEC
STRESS STAGE 3 HR: 111 BPM
STRESS STAGE RECOVERY 1 BP: NORMAL MMHG
STRESS STAGE RECOVERY 1 DURATION: 1 MIN:SEC
STRESS STAGE RECOVERY 1 HR: 99 BPM
STRESS STAGE RECOVERY 2 BP: NORMAL MMHG
STRESS STAGE RECOVERY 2 DURATION: 1 MIN:SEC
STRESS STAGE RECOVERY 2 HR: 94 BPM
STRESS STAGE RECOVERY 3 BP: NORMAL MMHG
STRESS STAGE RECOVERY 3 DURATION: 1 MIN:SEC
STRESS STAGE RECOVERY 3 HR: 96 BPM
STRESS STAGE RECOVERY 4 BP: NORMAL MMHG
STRESS STAGE RECOVERY 4 DURATION: 2 MIN:SEC
STRESS STAGE RECOVERY 4 HR: 93 BPM
STRESS TARGET HR: 154 BPM
TID: 0.98

## 2024-05-15 PROCEDURE — 93306 TTE W/DOPPLER COMPLETE: CPT

## 2024-05-15 PROCEDURE — 93017 CV STRESS TEST TRACING ONLY: CPT

## 2024-05-15 PROCEDURE — 6360000002 HC RX W HCPCS: Performed by: INTERNAL MEDICINE

## 2024-05-15 PROCEDURE — 3430000000 HC RX DIAGNOSTIC RADIOPHARMACEUTICAL: Performed by: INTERNAL MEDICINE

## 2024-05-15 PROCEDURE — A9500 TC99M SESTAMIBI: HCPCS | Performed by: INTERNAL MEDICINE

## 2024-05-15 PROCEDURE — 78452 HT MUSCLE IMAGE SPECT MULT: CPT

## 2024-05-15 RX ORDER — TETRAKIS(2-METHOXYISOBUTYLISOCYANIDE)COPPER(I) TETRAFLUOROBORATE 1 MG/ML
35 INJECTION, POWDER, LYOPHILIZED, FOR SOLUTION INTRAVENOUS
Status: COMPLETED | OUTPATIENT
Start: 2024-05-15 | End: 2024-05-15

## 2024-05-15 RX ORDER — TETRAKIS(2-METHOXYISOBUTYLISOCYANIDE)COPPER(I) TETRAFLUOROBORATE 1 MG/ML
10 INJECTION, POWDER, LYOPHILIZED, FOR SOLUTION INTRAVENOUS
Status: COMPLETED | OUTPATIENT
Start: 2024-05-15 | End: 2024-05-15

## 2024-05-15 RX ORDER — REGADENOSON 0.08 MG/ML
0.4 INJECTION, SOLUTION INTRAVENOUS
Status: COMPLETED | OUTPATIENT
Start: 2024-05-15 | End: 2024-05-15

## 2024-05-15 RX ADMIN — Medication 35 MILLICURIE: at 10:12

## 2024-05-15 RX ADMIN — REGADENOSON 0.4 MG: 0.08 INJECTION, SOLUTION INTRAVENOUS at 10:15

## 2024-05-15 RX ADMIN — Medication 10 MILLICURIE: at 09:17

## 2024-05-15 NOTE — TELEPHONE ENCOUNTER
----- Message from Karlos Vanegas MD sent at 5/15/2024 11:34 AM EDT -----  Abnormal study  Suggestive of CAD  Inform patient   Schedule C on 5/24/2024 at 2 pm

## 2024-05-16 ENCOUNTER — CARE COORDINATION (OUTPATIENT)
Dept: CASE MANAGEMENT | Age: 64
End: 2024-05-16

## 2024-05-16 ENCOUNTER — TELEPHONE (OUTPATIENT)
Dept: CARDIOLOGY CLINIC | Age: 64
End: 2024-05-16

## 2024-05-16 PROBLEM — R94.39 ABNORMAL STRESS TEST: Status: ACTIVE | Noted: 2024-05-15

## 2024-05-16 NOTE — TELEPHONE ENCOUNTER
Cath scheduled for 5/24/24 at 2:00 pt to arrive at 12:00.  Patient notified and given instructions and voiced understanding.  Mailed instructions.

## 2024-05-16 NOTE — CARE COORDINATION
Care Transitions Note    Follow Up Call      Patient Current Location:  Home: [address] [place] [id number]    Care Transition Nurse contacted the patient by telephone. Verified name and  as identifiers.    Additional needs identified to be addressed with provider   No needs identified                 Method of communication with provider: none.    Care Summary Note:  Spoke with Jatinder, said he is feeling good.  Is outside planting alexander.  Will have heart cath .  Denies chest pain, dyspnea, dizziness, fever, chills, n/v/d.  Eating, drinking, sleeping good.  No issues with B&B.  No other issues to report.  Denies any other needs.  No other questions or concerns at this time.  Will continue to follow 1 more call after heart cath.    Plan of care updates since last contact:  Heart cath        Advance Care Planning:   Does patient have an Advance Directive: reviewed during previous call, see note. .    Medication Review:  No changes since last call.     Remote Patient Monitoring:  Offered patient enrollment in the Remote Patient Monitoring (RPM) program for in-home monitoring: Yes, but did not enroll at this time: declined to enroll in the program because: not interested .    Assessments:  Care Transitions Subsequent and Final Call    Subsequent and Final Calls  Do you have any ongoing symptoms?: No  Have your medications changed?: No  Do you have any questions related to your medications?: No  Do you currently have any active services?: No  Do you have any needs or concerns that I can assist you with?: No  Identified Barriers: Lack of Education  Care Transitions Interventions     Transportation Support: Declined   Other Interventions:              Follow Up Appointment:   Reviewed upcoming appointment(s).  Future Appointments         Provider Specialty Dept Phone    2024 8:00 PM SCHEDULE, [facility] SLEEP TECH  Sleep Center [phone]    2024 10:20 AM Neema Solomon APRN - [facility]

## 2024-05-16 NOTE — TELEPHONE ENCOUNTER
PROCEDURE: cardiac cath     DATE OF SERVICE: 05/24/2024    SERVICE LOCATION: [facility]    CPT CODE: 33753    PHYSICIAN: DR ORNELAS     DATE PRIOR AUTH SUBMITTED: 05/16/2024    STATUS: APPROVED.     AUTH NUMBER: [id number]     VALID:  05/16/2024-08/13/2024

## 2024-05-21 NOTE — PRE-PROCEDURE INSTRUCTIONS
Notified of Heart Cath procedure arrival time 1200 on Friday  [place] on 2nd floor [facility]  NPO after midnight  Bring drivers license and insurance information  Wear comfortable clean clothes  Shower morning of and night before with liquid antibacterial soap  Remove jewelry   May have to stay overnight if have PTCA/stent - bring small overnight bag  Bring medications in original bottles - may take am meds with small amount of water.    Do not take any diabetic meds day of procedure.  Made aware of visitors limit to 2 at a time  Follow all instructions given by your physician  Please notify doctor office if you need to cancel or reschedule your procedure   needed at discharge  If you use CPap or BiPap for sleep apnea, please bring machine with you  Leave valuables at home

## 2024-05-23 ENCOUNTER — PREP FOR PROCEDURE (OUTPATIENT)
Dept: CARDIOLOGY | Age: 64
End: 2024-05-23

## 2024-05-23 RX ORDER — SODIUM CHLORIDE 9 MG/ML
INJECTION, SOLUTION INTRAVENOUS PRN
Status: CANCELLED | OUTPATIENT
Start: 2024-05-23

## 2024-05-23 RX ORDER — SODIUM CHLORIDE 9 MG/ML
INJECTION, SOLUTION INTRAVENOUS CONTINUOUS
Status: CANCELLED | OUTPATIENT
Start: 2024-05-23

## 2024-05-23 RX ORDER — SODIUM CHLORIDE 0.9 % (FLUSH) 0.9 %
5-40 SYRINGE (ML) INJECTION PRN
Status: CANCELLED | OUTPATIENT
Start: 2024-05-23

## 2024-05-23 RX ORDER — ASPIRIN 325 MG
325 TABLET ORAL ONCE
Status: CANCELLED | OUTPATIENT
Start: 2024-05-23 | End: 2024-05-23

## 2024-05-23 RX ORDER — NITROGLYCERIN 0.4 MG/1
0.4 TABLET SUBLINGUAL EVERY 5 MIN PRN
Status: CANCELLED | OUTPATIENT
Start: 2024-05-23

## 2024-05-23 RX ORDER — SODIUM CHLORIDE 0.9 % (FLUSH) 0.9 %
5-40 SYRINGE (ML) INJECTION EVERY 12 HOURS SCHEDULED
Status: CANCELLED | OUTPATIENT
Start: 2024-05-23

## 2024-05-24 ENCOUNTER — HOSPITAL ENCOUNTER (OUTPATIENT)
Age: 64
Discharge: HOME | End: 2024-05-25
Attending: INTERNAL MEDICINE | Admitting: INTERNAL MEDICINE
Payer: MEDICARE

## 2024-05-24 ENCOUNTER — APPOINTMENT (OUTPATIENT)
Age: 64
End: 2024-05-24
Attending: INTERNAL MEDICINE
Payer: MEDICARE

## 2024-05-24 DIAGNOSIS — I25.10 CAD IN NATIVE ARTERY: ICD-10-CM

## 2024-05-24 DIAGNOSIS — R94.39 ABNORMAL STRESS TEST: ICD-10-CM

## 2024-05-24 DIAGNOSIS — I21.3 STEMI (ST ELEVATION MYOCARDIAL INFARCTION) (HCC): ICD-10-CM

## 2024-05-24 DIAGNOSIS — I24.9 ACUTE CORONARY SYNDROME (HCC): Primary | ICD-10-CM

## 2024-05-24 PROBLEM — Z95.820 STATUS POST ANGIOPLASTY WITH STENT: Status: ACTIVE | Noted: 2024-05-24

## 2024-05-24 LAB
ABO: NORMAL
ACTIVATED CLOTTING TIME: 183 SECONDS (ref 1–150)
ACTIVATED CLOTTING TIME: 201 SECONDS (ref 1–150)
ACTIVATED CLOTTING TIME: 262 SECONDS (ref 1–150)
ACTIVATED CLOTTING TIME: 275 SECONDS (ref 1–150)
ACTIVATED CLOTTING TIME: 287 SECONDS (ref 1–150)
ANION GAP SERPL CALC-SCNC: 11 MEQ/L (ref 8–16)
ANTIBODY SCREEN: NORMAL
APTT PPP: 33.1 SECONDS (ref 22–38)
BUN SERPL-MCNC: 19 MG/DL (ref 7–22)
CALCIUM SERPL-MCNC: 10.2 MG/DL (ref 8.5–10.5)
CHLORIDE SERPL-SCNC: 101 MEQ/L (ref 98–111)
CHOLEST SERPL-MCNC: 148 MG/DL (ref 100–199)
CO2 SERPL-SCNC: 27 MEQ/L (ref 23–33)
CREAT SERPL-MCNC: 1.1 MG/DL (ref 0.4–1.2)
DEPRECATED RDW RBC AUTO: 46 FL (ref 35–45)
ECHO BSA: 1.81 M2
EKG ATRIAL RATE: 74 BPM
EKG P AXIS: 54 DEGREES
EKG P-R INTERVAL: 206 MS
EKG Q-T INTERVAL: 374 MS
EKG QRS DURATION: 92 MS
EKG QTC CALCULATION (BAZETT): 415 MS
EKG R AXIS: 63 DEGREES
EKG T AXIS: 39 DEGREES
EKG VENTRICULAR RATE: 74 BPM
ERYTHROCYTE [DISTWIDTH] IN BLOOD BY AUTOMATED COUNT: 14.8 % (ref 11.5–14.5)
GFR SERPL CREATININE-BSD FRML MDRD: 74 ML/MIN/1.73M2
GLUCOSE SERPL-MCNC: 108 MG/DL (ref 70–108)
HCT VFR BLD AUTO: 42.6 % (ref 42–52)
HDLC SERPL-MCNC: 57 MG/DL
HGB BLD-MCNC: 14 GM/DL (ref 14–18)
INR PPP: 0.99 (ref 0.85–1.13)
LDLC SERPL CALC-MCNC: 81 MG/DL
MCH RBC QN AUTO: 28.2 PG (ref 26–33)
MCHC RBC AUTO-ENTMCNC: 32.9 GM/DL (ref 32.2–35.5)
MCV RBC AUTO: 85.7 FL (ref 80–94)
PLATELET # BLD AUTO: 320 THOU/MM3 (ref 130–400)
PMV BLD AUTO: 9.5 FL (ref 9.4–12.4)
POTASSIUM SERPL-SCNC: 5.1 MEQ/L (ref 3.5–5.2)
RBC # BLD AUTO: 4.97 MILL/MM3 (ref 4.7–6.1)
RH FACTOR: NORMAL
SODIUM SERPL-SCNC: 139 MEQ/L (ref 135–145)
TRIGL SERPL-MCNC: 50 MG/DL (ref 0–199)
WBC # BLD AUTO: 6.5 THOU/MM3 (ref 4.8–10.8)

## 2024-05-24 PROCEDURE — C1874 STENT, COATED/COV W/DEL SYS: HCPCS | Performed by: INTERNAL MEDICINE

## 2024-05-24 PROCEDURE — 93308 TTE F-UP OR LMTD: CPT

## 2024-05-24 PROCEDURE — 93571 IV DOP VEL&/PRESS C FLO 1ST: CPT | Performed by: INTERNAL MEDICINE

## 2024-05-24 PROCEDURE — 93308 TTE F-UP OR LMTD: CPT | Performed by: INTERNAL MEDICINE

## 2024-05-24 PROCEDURE — 85347 COAGULATION TIME ACTIVATED: CPT

## 2024-05-24 PROCEDURE — 80061 LIPID PANEL: CPT

## 2024-05-24 PROCEDURE — 86900 BLOOD TYPING SEROLOGIC ABO: CPT

## 2024-05-24 PROCEDURE — 6370000000 HC RX 637 (ALT 250 FOR IP): Performed by: INTERNAL MEDICINE

## 2024-05-24 PROCEDURE — C1725 CATH, TRANSLUMIN NON-LASER: HCPCS | Performed by: INTERNAL MEDICINE

## 2024-05-24 PROCEDURE — 99153 MOD SED SAME PHYS/QHP EA: CPT | Performed by: INTERNAL MEDICINE

## 2024-05-24 PROCEDURE — 93010 ELECTROCARDIOGRAM REPORT: CPT | Performed by: INTERNAL MEDICINE

## 2024-05-24 PROCEDURE — C1769 GUIDE WIRE: HCPCS | Performed by: INTERNAL MEDICINE

## 2024-05-24 PROCEDURE — 80048 BASIC METABOLIC PNL TOTAL CA: CPT

## 2024-05-24 PROCEDURE — 36415 COLL VENOUS BLD VENIPUNCTURE: CPT

## 2024-05-24 PROCEDURE — 2500000003 HC RX 250 WO HCPCS: Performed by: INTERNAL MEDICINE

## 2024-05-24 PROCEDURE — 85610 PROTHROMBIN TIME: CPT

## 2024-05-24 PROCEDURE — 99152 MOD SED SAME PHYS/QHP 5/>YRS: CPT | Performed by: INTERNAL MEDICINE

## 2024-05-24 PROCEDURE — 2709999900 HC NON-CHARGEABLE SUPPLY: Performed by: INTERNAL MEDICINE

## 2024-05-24 PROCEDURE — C1887 CATHETER, GUIDING: HCPCS | Performed by: INTERNAL MEDICINE

## 2024-05-24 PROCEDURE — 93005 ELECTROCARDIOGRAM TRACING: CPT | Performed by: PHYSICIAN ASSISTANT

## 2024-05-24 PROCEDURE — 92928 PRQ TCAT PLMT NTRAC ST 1 LES: CPT | Performed by: INTERNAL MEDICINE

## 2024-05-24 PROCEDURE — 86850 RBC ANTIBODY SCREEN: CPT

## 2024-05-24 PROCEDURE — 85027 COMPLETE CBC AUTOMATED: CPT

## 2024-05-24 PROCEDURE — 93458 L HRT ARTERY/VENTRICLE ANGIO: CPT | Performed by: INTERNAL MEDICINE

## 2024-05-24 PROCEDURE — 6360000004 HC RX CONTRAST MEDICATION: Performed by: INTERNAL MEDICINE

## 2024-05-24 PROCEDURE — 85730 THROMBOPLASTIN TIME PARTIAL: CPT

## 2024-05-24 PROCEDURE — 6360000002 HC RX W HCPCS: Performed by: INTERNAL MEDICINE

## 2024-05-24 PROCEDURE — 2580000003 HC RX 258: Performed by: PHYSICIAN ASSISTANT

## 2024-05-24 PROCEDURE — 93005 ELECTROCARDIOGRAM TRACING: CPT | Performed by: INTERNAL MEDICINE

## 2024-05-24 PROCEDURE — 6370000000 HC RX 637 (ALT 250 FOR IP): Performed by: PHYSICIAN ASSISTANT

## 2024-05-24 PROCEDURE — C9600 PERC DRUG-EL COR STENT SING: HCPCS | Performed by: INTERNAL MEDICINE

## 2024-05-24 PROCEDURE — 86901 BLOOD TYPING SEROLOGIC RH(D): CPT

## 2024-05-24 PROCEDURE — C1894 INTRO/SHEATH, NON-LASER: HCPCS | Performed by: INTERNAL MEDICINE

## 2024-05-24 RX ORDER — HEPARIN SODIUM 1000 [USP'U]/ML
INJECTION, SOLUTION INTRAVENOUS; SUBCUTANEOUS PRN
Status: DISCONTINUED | OUTPATIENT
Start: 2024-05-24 | End: 2024-05-24 | Stop reason: HOSPADM

## 2024-05-24 RX ORDER — ALBUTEROL SULFATE 2.5 MG/3ML
2.5 SOLUTION RESPIRATORY (INHALATION) EVERY 6 HOURS PRN
Status: DISCONTINUED | OUTPATIENT
Start: 2024-05-24 | End: 2024-05-25

## 2024-05-24 RX ORDER — SODIUM CHLORIDE 0.9 % (FLUSH) 0.9 %
5-40 SYRINGE (ML) INJECTION PRN
Status: DISCONTINUED | OUTPATIENT
Start: 2024-05-24 | End: 2024-05-24 | Stop reason: HOSPADM

## 2024-05-24 RX ORDER — PHENYLEPHRINE HCL IN 0.9% NACL 1 MG/10 ML
VIAL (ML) INTRAVENOUS PRN
Status: DISCONTINUED | OUTPATIENT
Start: 2024-05-24 | End: 2024-05-24 | Stop reason: HOSPADM

## 2024-05-24 RX ORDER — ISOSORBIDE MONONITRATE 30 MG/1
30 TABLET, EXTENDED RELEASE ORAL DAILY
Status: DISCONTINUED | OUTPATIENT
Start: 2024-05-24 | End: 2024-05-25 | Stop reason: HOSPADM

## 2024-05-24 RX ORDER — OMEPRAZOLE 20 MG/1
20 CAPSULE, DELAYED RELEASE ORAL
Status: DISCONTINUED | OUTPATIENT
Start: 2024-05-25 | End: 2024-05-25 | Stop reason: HOSPADM

## 2024-05-24 RX ORDER — LIDOCAINE HYDROCHLORIDE 20 MG/ML
INJECTION, SOLUTION EPIDURAL; INFILTRATION; INTRACAUDAL; PERINEURAL PRN
Status: DISCONTINUED | OUTPATIENT
Start: 2024-05-24 | End: 2024-05-24 | Stop reason: HOSPADM

## 2024-05-24 RX ORDER — SODIUM CHLORIDE 9 MG/ML
INJECTION, SOLUTION INTRAVENOUS PRN
Status: DISCONTINUED | OUTPATIENT
Start: 2024-05-24 | End: 2024-05-24 | Stop reason: HOSPADM

## 2024-05-24 RX ORDER — SODIUM CHLORIDE 9 MG/ML
INJECTION, SOLUTION INTRAVENOUS PRN
Status: DISCONTINUED | OUTPATIENT
Start: 2024-05-24 | End: 2024-05-24 | Stop reason: SDUPTHER

## 2024-05-24 RX ORDER — SODIUM CHLORIDE 0.9 % (FLUSH) 0.9 %
5-40 SYRINGE (ML) INJECTION EVERY 12 HOURS SCHEDULED
Status: DISCONTINUED | OUTPATIENT
Start: 2024-05-24 | End: 2024-05-25 | Stop reason: HOSPADM

## 2024-05-24 RX ORDER — AMLODIPINE BESYLATE 5 MG/1
5 TABLET ORAL DAILY
Status: DISCONTINUED | OUTPATIENT
Start: 2024-05-25 | End: 2024-05-25 | Stop reason: HOSPADM

## 2024-05-24 RX ORDER — FENTANYL CITRATE 50 UG/ML
INJECTION, SOLUTION INTRAMUSCULAR; INTRAVENOUS PRN
Status: DISCONTINUED | OUTPATIENT
Start: 2024-05-24 | End: 2024-05-24 | Stop reason: HOSPADM

## 2024-05-24 RX ORDER — MIDAZOLAM HYDROCHLORIDE 1 MG/ML
INJECTION INTRAMUSCULAR; INTRAVENOUS PRN
Status: DISCONTINUED | OUTPATIENT
Start: 2024-05-24 | End: 2024-05-24 | Stop reason: HOSPADM

## 2024-05-24 RX ORDER — LISINOPRIL 30 MG/1
30 TABLET ORAL DAILY
Status: DISCONTINUED | OUTPATIENT
Start: 2024-05-25 | End: 2024-05-25 | Stop reason: HOSPADM

## 2024-05-24 RX ORDER — NITROGLYCERIN 0.4 MG/1
0.4 TABLET SUBLINGUAL EVERY 5 MIN PRN
Status: DISCONTINUED | OUTPATIENT
Start: 2024-05-24 | End: 2024-05-25 | Stop reason: HOSPADM

## 2024-05-24 RX ORDER — SODIUM CHLORIDE 0.9 % (FLUSH) 0.9 %
5-40 SYRINGE (ML) INJECTION EVERY 12 HOURS SCHEDULED
Status: DISCONTINUED | OUTPATIENT
Start: 2024-05-24 | End: 2024-05-24 | Stop reason: HOSPADM

## 2024-05-24 RX ORDER — ACETAMINOPHEN 325 MG/1
650 TABLET ORAL EVERY 4 HOURS PRN
Status: DISCONTINUED | OUTPATIENT
Start: 2024-05-24 | End: 2024-05-25 | Stop reason: HOSPADM

## 2024-05-24 RX ORDER — NITROGLYCERIN 0.4 MG/1
0.4 TABLET SUBLINGUAL EVERY 5 MIN PRN
Status: DISCONTINUED | OUTPATIENT
Start: 2024-05-24 | End: 2024-05-24 | Stop reason: HOSPADM

## 2024-05-24 RX ORDER — ASPIRIN 325 MG
325 TABLET ORAL ONCE
Status: DISCONTINUED | OUTPATIENT
Start: 2024-05-24 | End: 2024-05-24 | Stop reason: HOSPADM

## 2024-05-24 RX ORDER — SODIUM CHLORIDE 9 MG/ML
INJECTION, SOLUTION INTRAVENOUS CONTINUOUS
Status: DISCONTINUED | OUTPATIENT
Start: 2024-05-24 | End: 2024-05-24 | Stop reason: HOSPADM

## 2024-05-24 RX ORDER — ATROPINE SULFATE 0.1 MG/ML
INJECTION INTRAVENOUS PRN
Status: DISCONTINUED | OUTPATIENT
Start: 2024-05-24 | End: 2024-05-24 | Stop reason: HOSPADM

## 2024-05-24 RX ORDER — ADENOSINE 3 MG/ML
INJECTION, SOLUTION INTRAVENOUS CONTINUOUS PRN
Status: DISCONTINUED | OUTPATIENT
Start: 2024-05-24 | End: 2024-05-24 | Stop reason: HOSPADM

## 2024-05-24 RX ORDER — ASPIRIN 81 MG/1
81 TABLET ORAL DAILY
Status: DISCONTINUED | OUTPATIENT
Start: 2024-05-25 | End: 2024-05-25 | Stop reason: HOSPADM

## 2024-05-24 RX ORDER — SODIUM CHLORIDE 9 MG/ML
INJECTION, SOLUTION INTRAVENOUS CONTINUOUS
Status: DISCONTINUED | OUTPATIENT
Start: 2024-05-24 | End: 2024-05-24 | Stop reason: SDUPTHER

## 2024-05-24 RX ORDER — ROSUVASTATIN CALCIUM 20 MG/1
20 TABLET, COATED ORAL NIGHTLY
Status: DISCONTINUED | OUTPATIENT
Start: 2024-05-24 | End: 2024-05-25 | Stop reason: HOSPADM

## 2024-05-24 RX ORDER — SODIUM CHLORIDE 0.9 % (FLUSH) 0.9 %
5-40 SYRINGE (ML) INJECTION PRN
Status: DISCONTINUED | OUTPATIENT
Start: 2024-05-24 | End: 2024-05-25 | Stop reason: HOSPADM

## 2024-05-24 RX ADMIN — METOPROLOL TARTRATE 12.5 MG: 25 TABLET, FILM COATED ORAL at 21:13

## 2024-05-24 RX ADMIN — SODIUM CHLORIDE: 9 INJECTION, SOLUTION INTRAVENOUS at 13:19

## 2024-05-24 RX ADMIN — TICAGRELOR 90 MG: 90 TABLET ORAL at 23:27

## 2024-05-24 RX ADMIN — NITROGLYCERIN 0.4 MG: 0.4 TABLET, ORALLY DISINTEGRATING SUBLINGUAL at 15:26

## 2024-05-24 RX ADMIN — ROSUVASTATIN CALCIUM 20 MG: 20 TABLET, FILM COATED ORAL at 21:13

## 2024-05-24 ASSESSMENT — PAIN DESCRIPTION - LOCATION
LOCATION: CHEST
LOCATION: CHEST

## 2024-05-24 ASSESSMENT — PAIN DESCRIPTION - ORIENTATION: ORIENTATION: MID

## 2024-05-24 ASSESSMENT — PAIN DESCRIPTION - DESCRIPTORS: DESCRIPTORS: DULL

## 2024-05-24 ASSESSMENT — PAIN SCALES - GENERAL
PAINLEVEL_OUTOF10: 3
PAINLEVEL_OUTOF10: 3

## 2024-05-24 NOTE — FLOWSHEET NOTE
1100 Patient admitted to 2E11  Ambulatory for left heart catherization.  Patient NPO. Patient accompanied by spouse  Vital signs obtained.   Assessment and data collection intiated.   Oriented to room.  Policies and procedures for 2E explained.   All questions answered with no further questions at this time.   Fall precautions reviewed with patient.     1100 Care plan reviewed with patient and spouse.  Patient and spouse verbalize understanding of the plan of care and contribute to goal setting.       1345 to cath lab per bed, stable condition.     1520 care taken over from cath lab, site with Vasc band to right radial dry and intact.     1523 /84 gave 1 nitro under tongue.   1535 BP 56/44 placed bed in supine position. Opened fluids   1537 71/61  1540 86/66  1540 Dr Vanegas at bedside.   1543 ordered stat echo  1548 echo tech at bedside.   1549 Dr Vanegas will take patient back to cath lab, notified Kaylee in cath lab.   1550 /79    1552 Patient received and reviewed booklet \"How to care for your heart after coronary artery disease\". Reviewed how to take care of the incision site, the importance of participating in cardiac rehab and the hours of operations. Reviewed importance of reducing coronary artery disease risk factors. Stent card given.     1556 Patient back to cath lab.   1650 care taken over from cath lab, sheath in right groin , transduced.  1711 repeat EKG.  Patient denies CP    1715 stent card given from emergent cath procedure.  1855   1959 .  Messaged Dr Vanegas, new order to pull in 15 minutes, hold manual pressure for 15 minutes.     2015 pulled femoral sheath, held manual pressure 20 minutes.  Applied quick clot and tegaderm.     2123 Called report to TRISTEN Marshall on 3B  2220 transported patient to  in stable condition.  RN bedside site checks, radial and femoral sites soft to touch.

## 2024-05-24 NOTE — H&P
no further questions and wished to proceed; the consent form was signed.        MEDICAL HISTORY  []ASHD/ANGINA/MI/CHF   []Hypertension  []Diabetes  []Hyperlipidemia  []Smoking  []Family Hx of ASHD  []Additional information:       has a past medical history of Bilateral inguinal hernia, Chronic back pain, COPD (chronic obstructive pulmonary disease) (HCC), Cough, Essential hypertension, GERD (gastroesophageal reflux disease), Osteoarthritis, Recurrent left inguinal hernia, and Stomach ulcer.    SURGICAL HISTORY   has a past surgical history that includes Rotator cuff repair (2005 x2); hernia repair; hernia repair (01/03/2012); Vasectomy (2005); Tooth Extraction (1997); Colonoscopy (2019); EGD (2019); Wrist surgery (12/27/2018); Arm Surgery (Left, 03/2020); hernia repair (Bilateral, 04/28/2020); hernia repair (Left, 03/23/2021); and Total hip arthroplasty (01/23/2024).  Additional information:       ALLERGIES   Allergies as of 05/16/2024 - Fully Reviewed 05/07/2024   Allergen Reaction Noted    Peanut oil Diarrhea 05/15/2024    Sulfa antibiotics Hives 12/07/2011     Additional information:       MEDICATIONS   Aspirin  [] 81 mg  [] 325 mg  [] None  Antiplatelet drug therapy use last 5 days  []No []Yes  Coumadin Use Last 5 Days []No []Yes  Other anticoagulant use last 5 days  []No []Yes    Current Facility-Administered Medications:     0.9 % sodium chloride infusion, , IntraVENous, Continuous, Bobbi Woods, PA-C, Last Rate: 75 mL/hr at 05/24/24 1319, New Bag at 05/24/24 1319    sodium chloride flush 0.9 % injection 5-40 mL, 5-40 mL, IntraVENous, 2 times per day, Bobbi Woods, PA-C    sodium chloride flush 0.9 % injection 5-40 mL, 5-40 mL, IntraVENous, PRN, Bobbi Woods, PA-C    0.9 % sodium chloride infusion, , IntraVENous, PRN, Bobbi Woods, PA-C    aspirin tablet 325 mg, 325 mg, Oral, Once, Bobbi Woods, PA-C    nitroGLYCERIN (NITROSTAT) SL tablet 0.4 mg, 0.4 mg, SubLINGual, Q5 Min PRN,  pulses 2+ bilaterally without bruits  Heart: RRR, S1 & S2 WNL. No murmurs    Lungs: Clear to auscultation    Abdomen: BS present, without HSM, masses, or tenderness    Extremities: without C,C,E.  Pulses 2+ bilaterally   Mental Status: Alert & Oriented        PLANNED PROCEDURE   [x]Cath  [x]PCI                []Pacemaker/AICD  []TRINITY             []Cardioversion []Peripheral angiography/PTA  []Other:      SEDATION  Planned agent:[x]Midazolam []Meperidine []Sublimaze []Morphine  []Diazepam  []Other:     ASA Classification:  []1 [x]2 []3 []4 []5   Class 1: A normal healthy patient  Class 2: Pt with mild to moderate systemic disease  Class 3: Severe systemic disease or disturbance  Class 4: Severe systemic disorders that are already life threatening.  Class 5: Moribund pt with little chances of survival, for more than 24 hours.  Mallampati I Airway Classification:   []1 [x]2 []3 []4     [x]Pre-procedure diagnostic studies complete and results available.   Comment:    [x]Previous sedation/anesthesia experiences assessed.   Comment:    [x]The patient is an appropriate candidate to undergo the planned procedure sedation and anesthesia. (Refer to nursing sedation/analgesia documentation record)  [x]Formulation and discussion of sedation/procedure plan, risks, and expectations with patient and/or responsible adult completed.  [x]Patient examined immediately prior to the procedure. (Refer to nursing sedation/analgesia documentation record)      Karlos Vanegas MD, FACC, [facility]    Electronically signed 5/24/2024 at 4:11 PM

## 2024-05-24 NOTE — H&P
[facility]  Sedation/Analgesia History & Physical    Pt Name: [place]  Account number: [id number]  MRN: [id number]  YOB: 1960  Provider Performing Procedure: Karlos Vanegas MD MD  Referring Provider: Karlos Vanegas MD   Primary Care Physician: Marsha Ireland MD  Date: 5/24/2024    PRE-PROCEDURE    Code Status: FULL CODE  Brief History/Pre-Procedure Diagnosis:   Angina, abnormal stress test   Consent: : I have discussed with the patient risks, benefits, and alternatives (and relevant risks, benefits, and side effects related to alternatives or not receiving care), and likelihood of the success.   The patient and/or representative appear to understand and agree to proceed.  The discussion encompasses risks, benefits, and side effects related to the alternatives and the risks related to not receiving the proposed care, treatment, and services.     The indication, risks and benefits of the procedure and possible therapeutic consequences and alternatives were discussed with the patient. The patient was given the opportunity to ask questions and to have them answered to his/her satisfaction. Risks of the procedure include but are not limited to the following: Bleeding, hematoma including retroperitoneal hemmorhage, infection, pain and discomfort, injury to the aorta and other blood vessels, rhythm disturbance, low blood pressure, myocardial infarction, stroke, kidney damage/failure, myocardial perforation, allergic reactions to sedatives/contrast material, loss of pulse/vascular compromise requiring surgery, aneurysm/pseudoaneurysm formation, possible loss of a limb/hand/leg, needing blood transfusion, requiring emergent open heart surgery or emergent coronary intervention, the need for intubation/respiratory support, the requirement for defibrillation/cardioversion, and death. Alternatives to and omission of the suggested procedure were discussed. The patient had no further  questions and wished to proceed; the consent form was signed.        MEDICAL HISTORY  []ASHD/ANGINA/MI/CHF   []Hypertension  []Diabetes  []Hyperlipidemia  []Smoking  []Family Hx of ASHD  []Additional information:       has a past medical history of Bilateral inguinal hernia, Chronic back pain, COPD (chronic obstructive pulmonary disease) (HCC), Cough, Essential hypertension, GERD (gastroesophageal reflux disease), Osteoarthritis, Recurrent left inguinal hernia, and Stomach ulcer.    SURGICAL HISTORY   has a past surgical history that includes Rotator cuff repair (2005 x2); hernia repair; hernia repair (01/03/2012); Vasectomy (2005); Tooth Extraction (1997); Colonoscopy (2019); EGD (2019); Wrist surgery (12/27/2018); Arm Surgery (Left, 03/2020); hernia repair (Bilateral, 04/28/2020); hernia repair (Left, 03/23/2021); and Total hip arthroplasty (01/23/2024).  Additional information:       ALLERGIES   Allergies as of 05/16/2024 - Fully Reviewed 05/07/2024   Allergen Reaction Noted    Peanut oil Diarrhea 05/15/2024    Sulfa antibiotics Hives 12/07/2011     Additional information:       MEDICATIONS   Aspirin  [] 81 mg  [] 325 mg  [] None  Antiplatelet drug therapy use last 5 days  []No []Yes  Coumadin Use Last 5 Days []No []Yes  Other anticoagulant use last 5 days  []No []Yes    Current Facility-Administered Medications:     0.9 % sodium chloride infusion, , IntraVENous, Continuous, Bobbi Woods PA-C, Last Rate: 75 mL/hr at 05/24/24 1319, New Bag at 05/24/24 1319    sodium chloride flush 0.9 % injection 5-40 mL, 5-40 mL, IntraVENous, 2 times per day, Bobbi Woods, PA-C    sodium chloride flush 0.9 % injection 5-40 mL, 5-40 mL, IntraVENous, PRN, Bobbi Woods, PA-C    0.9 % sodium chloride infusion, , IntraVENous, PRN, Bobbi Woods, PA-C    aspirin tablet 325 mg, 325 mg, Oral, Once, Bobbi Woods PA-GIUSEPPE    nitroGLYCERIN (NITROSTAT) SL tablet 0.4 mg, 0.4 mg, SubLINGual, Q5 Min PRN, Peggy

## 2024-05-25 VITALS
RESPIRATION RATE: 18 BRPM | WEIGHT: 155 LBS | HEIGHT: 66 IN | TEMPERATURE: 98.4 F | OXYGEN SATURATION: 99 % | DIASTOLIC BLOOD PRESSURE: 78 MMHG | HEART RATE: 71 BPM | BODY MASS INDEX: 24.91 KG/M2 | SYSTOLIC BLOOD PRESSURE: 118 MMHG

## 2024-05-25 LAB
ANION GAP SERPL CALC-SCNC: 10 MEQ/L (ref 8–16)
BUN SERPL-MCNC: 18 MG/DL (ref 7–22)
CALCIUM SERPL-MCNC: 9.2 MG/DL (ref 8.5–10.5)
CHLORIDE SERPL-SCNC: 104 MEQ/L (ref 98–111)
CO2 SERPL-SCNC: 24 MEQ/L (ref 23–33)
CREAT SERPL-MCNC: 1.1 MG/DL (ref 0.4–1.2)
DEPRECATED RDW RBC AUTO: 48 FL (ref 35–45)
EKG ATRIAL RATE: 66 BPM
EKG ATRIAL RATE: 88 BPM
EKG P AXIS: 40 DEGREES
EKG P AXIS: 53 DEGREES
EKG P-R INTERVAL: 168 MS
EKG P-R INTERVAL: 206 MS
EKG Q-T INTERVAL: 364 MS
EKG Q-T INTERVAL: 384 MS
EKG QRS DURATION: 94 MS
EKG QRS DURATION: 94 MS
EKG QTC CALCULATION (BAZETT): 402 MS
EKG QTC CALCULATION (BAZETT): 440 MS
EKG R AXIS: 65 DEGREES
EKG R AXIS: 75 DEGREES
EKG T AXIS: 49 DEGREES
EKG T AXIS: 80 DEGREES
EKG VENTRICULAR RATE: 66 BPM
EKG VENTRICULAR RATE: 88 BPM
ERYTHROCYTE [DISTWIDTH] IN BLOOD BY AUTOMATED COUNT: 15.2 % (ref 11.5–14.5)
GFR SERPL CREATININE-BSD FRML MDRD: 74 ML/MIN/1.73M2
GLUCOSE SERPL-MCNC: 103 MG/DL (ref 70–108)
HCT VFR BLD AUTO: 38 % (ref 42–52)
HGB BLD-MCNC: 12.7 GM/DL (ref 14–18)
MCH RBC QN AUTO: 28.7 PG (ref 26–33)
MCHC RBC AUTO-ENTMCNC: 33.4 GM/DL (ref 32.2–35.5)
MCV RBC AUTO: 86 FL (ref 80–94)
PLATELET # BLD AUTO: 287 THOU/MM3 (ref 130–400)
PMV BLD AUTO: 9.5 FL (ref 9.4–12.4)
POTASSIUM SERPL-SCNC: 4.2 MEQ/L (ref 3.5–5.2)
RBC # BLD AUTO: 4.42 MILL/MM3 (ref 4.7–6.1)
SODIUM SERPL-SCNC: 138 MEQ/L (ref 135–145)
WBC # BLD AUTO: 6.2 THOU/MM3 (ref 4.8–10.8)

## 2024-05-25 PROCEDURE — 99232 SBSQ HOSP IP/OBS MODERATE 35: CPT | Performed by: NURSE PRACTITIONER

## 2024-05-25 PROCEDURE — 85027 COMPLETE CBC AUTOMATED: CPT

## 2024-05-25 PROCEDURE — 6370000000 HC RX 637 (ALT 250 FOR IP): Performed by: INTERNAL MEDICINE

## 2024-05-25 PROCEDURE — 80048 BASIC METABOLIC PNL TOTAL CA: CPT

## 2024-05-25 PROCEDURE — 93010 ELECTROCARDIOGRAM REPORT: CPT | Performed by: INTERNAL MEDICINE

## 2024-05-25 PROCEDURE — 36415 COLL VENOUS BLD VENIPUNCTURE: CPT

## 2024-05-25 PROCEDURE — 2580000003 HC RX 258: Performed by: INTERNAL MEDICINE

## 2024-05-25 RX ORDER — NITROGLYCERIN 0.4 MG/1
0.4 TABLET SUBLINGUAL EVERY 5 MIN PRN
Qty: 25 TABLET | Refills: 1 | Status: SHIPPED | OUTPATIENT
Start: 2024-05-25

## 2024-05-25 RX ORDER — ALBUTEROL SULFATE 2.5 MG/3ML
2.5 SOLUTION RESPIRATORY (INHALATION) EVERY 4 HOURS PRN
Status: DISCONTINUED | OUTPATIENT
Start: 2024-05-25 | End: 2024-05-25 | Stop reason: HOSPADM

## 2024-05-25 RX ORDER — ROSUVASTATIN CALCIUM 20 MG/1
20 TABLET, COATED ORAL NIGHTLY
Qty: 30 TABLET | Refills: 3 | Status: SHIPPED | OUTPATIENT
Start: 2024-05-25 | End: 2024-05-28

## 2024-05-25 RX ADMIN — LISINOPRIL 30 MG: 30 TABLET ORAL at 07:56

## 2024-05-25 RX ADMIN — METOPROLOL TARTRATE 12.5 MG: 25 TABLET, FILM COATED ORAL at 08:00

## 2024-05-25 RX ADMIN — AMLODIPINE BESYLATE 5 MG: 5 TABLET ORAL at 07:55

## 2024-05-25 RX ADMIN — ISOSORBIDE MONONITRATE 30 MG: 30 TABLET, EXTENDED RELEASE ORAL at 07:55

## 2024-05-25 RX ADMIN — SODIUM CHLORIDE, PRESERVATIVE FREE 10 ML: 5 INJECTION INTRAVENOUS at 07:54

## 2024-05-25 RX ADMIN — OMEPRAZOLE 20 MG: 20 CAPSULE, DELAYED RELEASE ORAL at 07:56

## 2024-05-25 RX ADMIN — ASPIRIN 81 MG: 81 TABLET ORAL at 07:55

## 2024-05-25 RX ADMIN — TICAGRELOR 90 MG: 90 TABLET ORAL at 07:54

## 2024-05-25 NOTE — DISCHARGE INSTRUCTIONS
Discharge Instructions for Radial Heart Catherization    1.  Take it easy for 3-4 days.  2.  No driving for 2 days.  3.  No lifting of 5 lbs or more for 5 days with the affected arm.  4.  Can shower after 24 hours.  5.  Remove arm board after 24 hours.  6.  Apply a band aid to the insertion site daily for 5 days.  May apply antibiotic ointment if desired, but not necessary.  Wash site daily with soap and water.  7.  No creams, ointments, or powders near the insertion site.   8.  No tub baths, swimming, hot tubs, or hand washing dishes for 1 week.  9.  Watch for signs of infection (redness, warmth, swelling, or pus drainage) or coolness of extremity and call physician if this occurs  10.  If bleeding occurs from insertion site, apply pressure and call 911.     Watch for numbness/tingling - if this occurs go to ER ASAP  Apply ice 15min with hour break in between and alternate with heat compress for 15 min to reduce swelling for 3-5 days        Groin Care Instructions        Normal Observation: You may or may not experience these.    Soreness or tenderness that may last a few weeks.    Possible bruising that could last a few weeks and up to one month.   Formation of a small lump (dime to quarter size) that should last only a few weeks.    Care of your incision  You may shower 24 hours after the procedure. Wet the dressing thoroughly and gently remove the bandage from the hospital during showering. It is easier to remove this way.             Gently clean your site daily using soap and water while standing in the shower. Dry thoroughly.   Do not apply powders or lotions to the site for 2 weeks.   Keep the site clean and dry to prevent infection.    Do not sit in a bathtub or a pool of water for 7 days.    Inspect the site daily.    Activity   You may resume normal activity in 2 days, including driving, letting pain be your     guide.   Limit lifting over 5 pounds (half gallon of milk) to one week or until site

## 2024-05-25 NOTE — PROGRESS NOTES
IV removed, telemetry removed and  left in room for cleaning, discharge instructions reviewed with patient and signed, patient awaiting arrival of his wife for transport home.

## 2024-05-25 NOTE — PROGRESS NOTES
RCA    After PCI and while in recovery, the patient developed recurrent chest pain and drop in blood pressure with EKG changes. Decision was made to take patient back for LHC and possible PCI. R/B/I/A were discussed with patient and his family, they agreed to proceed.     Coronary Findings    Diagnostic  Dominance: Co-dominant  Left Main   The vessel was not injected. Size of vessel >=2.0 mm.      Right Coronary Artery   Ost RCA lesion, 70% stenosed.   Previously placed Prox RCA to Mid RCA drug eluting stent is widely patent. The lesion was previously treated using a drug-eluting stent. No thrombosis in the previous stent. No restenosis in the previous stent. After PCI and while in recovery, the patient developed recurrent chest pain and drop in blood pressure with EKG changes. Decision was made to take patient back for LHC and possible PCI. Stents in RCA were patent. There was concern for proximal stent edge dissection (less likely) Vs more likely residual lesion in proximal/ostial RCA. Based on those findings and post PCI chest pain with EKG changes, I elected to proceed with PCI/GUERDA.   Previously placed Dist RCA drug eluting stent is widely patent. The lesion was previously treated using a drug-eluting stent. No thrombosis in the previous stent. No restenosis in the previous stent.      Intervention     Ost RCA lesion   Stent   Drug-eluting stent was successfully placed.   Supplies used: STENT CORONARY CONNOR FRONTIER RX 2.75X18 MM ZOTAROLIMUS ELUT   Angioplasty   Angioplasty was performed following stent deployment. Lesion complication(s): no complications.   Supplies used: STENT CORONARY CONNOR FRONTIER RX 2.75X18 MM ZOTAROLIMUS ELUT   Post-Intervention Lesion Assessment   The intervention was successful. The pre-interventional distal flow is normal (DOMONIQUE 3). Post-intervention DOMONIQUE flow is 3. There were no complications.   There is a 0% residual stenosis post intervention.        Coronary  Diagram    Diagnostic  Dominance: Co-dominant    Intervention      Lab Data:    Cardiac Enzymes:  No results for input(s): \"CKTOTAL\", \"CKMB\", \"CKMBINDEX\", \"TROPONINI\" in the last 72 hours.    CBC:   Lab Results   Component Value Date/Time    WBC 6.2 05/25/2024 04:25 AM    RBC 4.42 05/25/2024 04:25 AM    HGB 12.7 05/25/2024 04:25 AM    HCT 38.0 05/25/2024 04:25 AM     05/25/2024 04:25 AM       CMP:    Lab Results   Component Value Date/Time     05/25/2024 04:25 AM    K 4.2 05/25/2024 04:25 AM    K 4.7 04/26/2024 05:23 AM     05/25/2024 04:25 AM    CO2 24 05/25/2024 04:25 AM    BUN 18 05/25/2024 04:25 AM    CREATININE 1.1 05/25/2024 04:25 AM    LABGLOM 74 05/25/2024 04:25 AM    LABGLOM >90 04/26/2024 05:23 AM    LABGLOM 80 07/06/2020 03:27 PM    GLUCOSE 103 05/25/2024 04:25 AM    CALCIUM 9.2 05/25/2024 04:25 AM       Hepatic Function Panel:    Lab Results   Component Value Date/Time    ALKPHOS 69 05/09/2024 10:38 AM    ALT 20 05/09/2024 10:38 AM    AST 13 05/09/2024 10:38 AM    BILITOT 0.3 05/09/2024 10:38 AM    BILIDIR <0.2 04/12/2018 09:17 AM       Magnesium:    Lab Results   Component Value Date/Time    MG 2.0 04/25/2024 01:08 PM       PT/INR:    Lab Results   Component Value Date/Time    INR 0.99 05/24/2024 11:15 AM       HgBA1c:    Lab Results   Component Value Date/Time    LABA1C 6.1 10/04/2023 01:38 PM       FLP:    Lab Results   Component Value Date/Time    TRIG 50 05/24/2024 11:15 AM    HDL 57 05/24/2024 11:15 AM       TSH:    Lab Results   Component Value Date/Time    TSH 0.916 10/04/2023 01:38 PM         Assessment:  Sp OP elective cardiac cath 5/24/24  Successful PCI/GUERDA of RCA  70% mid LAD stenosis with FFR of 0.78-- needs staged later     - recurrent chest pain / USA   Sp cath Residual ostial RCA stenosis, s/p successful PCI/GUERDA  Otherwise, patent intervention sites in RCA      HTN stable  HLP LDL 81  new start statin      Plan:  Ok for DC   Follow 2 weeks         Patient and provider  Interpolation Flap Text: A decision was made to reconstruct the defect utilizing an interpolation axial flap and a staged reconstruction.  A telfa template was made of the defect.  This telfa template was then used to outline the interpolation flap.  The donor area for the pedicle flap was then injected with anesthesia.  The flap was excised through the skin and subcutaneous tissue down to the layer of the underlying musculature.  The interpolation flap was carefully excised within this deep plane to maintain its blood supply.  The edges of the donor site were undermined.   The donor site was closed in a primary fashion.  The pedicle was then rotated into position and sutured.  Once the tube was sutured into place, adequate blood supply was confirmed with blanching and refill.  The pedicle was then wrapped with xeroform gauze and dressed appropriately with a telfa and gauze bandage to ensure continued blood supply and protect the attached pedicle.

## 2024-05-25 NOTE — PLAN OF CARE
Problem: Discharge Planning  Goal: Discharge to home or other facility with appropriate resources  Flowsheets (Taken 5/25/2024 8592)  Discharge to home or other facility with appropriate resources: Identify barriers to discharge with patient and caregiver   Care plan reviewed with patient.  Patient verbalizes understanding of the care plan and contributed to goal setting.

## 2024-05-28 ENCOUNTER — TELEPHONE (OUTPATIENT)
Dept: CARDIOLOGY CLINIC | Age: 64
End: 2024-05-28

## 2024-05-28 ENCOUNTER — CARE COORDINATION (OUTPATIENT)
Dept: CASE MANAGEMENT | Age: 64
End: 2024-05-28

## 2024-05-28 ENCOUNTER — TELEPHONE (OUTPATIENT)
Dept: CARDIAC REHAB | Age: 64
End: 2024-05-28

## 2024-05-28 DIAGNOSIS — I25.10 CAD IN NATIVE ARTERY: Primary | ICD-10-CM

## 2024-05-28 DIAGNOSIS — M15.9 PRIMARY OSTEOARTHRITIS INVOLVING MULTIPLE JOINTS: ICD-10-CM

## 2024-05-28 RX ORDER — ROSUVASTATIN CALCIUM 20 MG/1
20 TABLET, COATED ORAL NIGHTLY
Qty: 90 TABLET | Refills: 3 | Status: SHIPPED | OUTPATIENT
Start: 2024-05-28

## 2024-05-28 NOTE — TELEPHONE ENCOUNTER
[place] called requesting a refill on the following medications:  Requested Prescriptions     Pending Prescriptions Disp Refills    meloxicam (MOBIC) 15 MG tablet 30 tablet 0     Sig: Take 1 tablet by mouth daily       Date of last visit: 5/7/2024  Date of next visit (if applicable):10/8/2024  Date of last refill:   Pharmacy Name: [facility],  Emma Kovacs MA

## 2024-05-28 NOTE — TELEPHONE ENCOUNTER
Jhon in cath lab called and stated dr rich wanted to do a PCI on this patient 6/4 at 11:00. Can I have an order?

## 2024-05-28 NOTE — TELEPHONE ENCOUNTER
Will change to Plavix  Give Plavix 600 mg po * 1 dose 12 hours after last dose of Brilinta, then 75 mg po daily

## 2024-05-28 NOTE — TELEPHONE ENCOUNTER
PROCEDURE: PCI     DATE OF SERVICE: 06/04/2024    SERVICE LOCATION: [facility]    CPT CODE: 99519    PHYSICIAN: DR ORNELAS     DATE PRIOR AUTH SUBMITTED: 05/28/2024    STATUS: APPROVED.     AUTH NUMBER: [id number]     VALID:  05/28/2024-08/25/2024

## 2024-05-28 NOTE — CARE COORDINATION
Care Transitions Follow Up Call    Patient Current Location:  Home: [address] [place] [id number]    [facility] Care Coordinator contacted the patient by telephone to follow up after admission on -, -.  Verified name and  with patient as identifiers.    Patient: [place]  Patient : 1960   MRN: <[id number]>  Reason for Admission: ARF with hypoxia, suspected COPD, s/p cardiac cath  Discharge Date: 24 RARS: Readmission Risk Score: 11.6      Needs to be reviewed by the provider   Additional needs identified to be addressed with provider: No  none             Method of communication with provider: none.    LPN CC spoke with patient. States he is \"not too bad.\" Has some SOB after starting new meds. //80s. P 78-80. SPO2 96%. Cardiac cath site CDI, healing well. Some bruising. LPN CC encouraged ice to area. Has had x1 instance of CP that resolved with x1 dose nitro. Denies dizziness, palpitations. Some HA. Appetite good. Denies problems with bowels or bladder. Denies medication changes. Denies needs.   Justyna Cat LPN CC  Care Transitions  [phone]    Addressed changes since last contact:  none  Discussed follow-up appointments. If no appointment was previously scheduled, appointment scheduling offered: Yes.   Is follow up appointment scheduled within 7 days of discharge? Yes.    Follow Up  Future Appointments   Date Time Provider Department Center   2024  8:00 PM SCHEDULE, [facility] SLEEP TECH  STR SLEEP [facility]   2024 10:20 AM Neema Solomon APRN - CNP N Pulm Med [facility] [place]   9/3/2024 10:00 AM Lali Madera MD N Pulm Med [facility] [place]   10/8/2024 10:20 AM Marsha Ireland MD \[facility] [place] [facility]\""X DELPHOS [facility] [place]   2025  1:30 PM Karlos Vanegas MD N SRPX Heart [facility] [place]     External follow up appointment(s): PAL    LPN Care Coordinator reviewed medical action plan and red flags with patient and discussed any barriers to care and/or understanding of plan

## 2024-05-28 NOTE — TELEPHONE ENCOUNTER
Inpatient Cardiac Rehabilitation Consult    Received consult for Phase II Cardiac Rehabilitation.  Patient needs cardiac rehab due to PCI on 5/24/24.  Patient discharged prior to being seen inpatient, call placed to patient at home. Importance of Cardiac Rehab discussed with patient.  Reviewed cardiac rehab class times.  Patient questions answered. Patient states he is getting another stent \"soon\" and will think about it after that.

## 2024-05-29 PROBLEM — I25.10 CAD (CORONARY ARTERY DISEASE): Status: ACTIVE | Noted: 2024-05-28

## 2024-05-29 RX ORDER — CLOPIDOGREL BISULFATE 75 MG/1
75 TABLET ORAL DAILY
Qty: 90 TABLET | Refills: 3 | Status: SHIPPED | OUTPATIENT
Start: 2024-05-29

## 2024-05-29 RX ORDER — MELOXICAM 15 MG/1
15 TABLET ORAL DAILY
Qty: 30 TABLET | Refills: 0 | OUTPATIENT
Start: 2024-05-29

## 2024-05-29 NOTE — TELEPHONE ENCOUNTER
Recommendation was to use mobic only short-term due to heart and GI effects.  Instead of mobic, use tylenol arthritis, heating pad, biofreeze/icy hot and gentle stretches.

## 2024-05-29 NOTE — TELEPHONE ENCOUNTER
Pt verbalized understanding.   Pt advised to stay on brilinta until he is able to  Plavix from Pharmacy.   Aware to start Plavix 12 hours after last dose of Brilinta.  He is aware first dose is 600mg then will start 75mg daily after that.   RX pended.   Brilinta added to allergy list.

## 2024-06-03 ENCOUNTER — PREP FOR PROCEDURE (OUTPATIENT)
Dept: CARDIOLOGY | Age: 64
End: 2024-06-03

## 2024-06-03 RX ORDER — SODIUM CHLORIDE 0.9 % (FLUSH) 0.9 %
5-40 SYRINGE (ML) INJECTION PRN
Status: CANCELLED | OUTPATIENT
Start: 2024-06-03

## 2024-06-03 RX ORDER — ASPIRIN 325 MG
325 TABLET ORAL ONCE
Status: CANCELLED | OUTPATIENT
Start: 2024-06-03 | End: 2024-06-03

## 2024-06-03 RX ORDER — SODIUM CHLORIDE 9 MG/ML
INJECTION, SOLUTION INTRAVENOUS PRN
Status: CANCELLED | OUTPATIENT
Start: 2024-06-03

## 2024-06-03 RX ORDER — SODIUM CHLORIDE 0.9 % (FLUSH) 0.9 %
5-40 SYRINGE (ML) INJECTION EVERY 12 HOURS SCHEDULED
Status: CANCELLED | OUTPATIENT
Start: 2024-06-03

## 2024-06-03 RX ORDER — NITROGLYCERIN 0.4 MG/1
0.4 TABLET SUBLINGUAL EVERY 5 MIN PRN
Status: CANCELLED | OUTPATIENT
Start: 2024-06-03

## 2024-06-03 RX ORDER — DIPHENHYDRAMINE HYDROCHLORIDE 50 MG/ML
50 INJECTION INTRAMUSCULAR; INTRAVENOUS ONCE
Status: CANCELLED | OUTPATIENT
Start: 2024-06-03 | End: 2024-06-03

## 2024-06-04 ENCOUNTER — HOSPITAL ENCOUNTER (OUTPATIENT)
Age: 64
Discharge: HOME | End: 2024-06-05
Attending: INTERNAL MEDICINE | Admitting: INTERNAL MEDICINE
Payer: MEDICARE

## 2024-06-04 DIAGNOSIS — I25.10 CAD (CORONARY ARTERY DISEASE): ICD-10-CM

## 2024-06-04 LAB
ACTIVATED CLOTTING TIME: 281 SECONDS (ref 1–150)
ANION GAP SERPL CALC-SCNC: 12 MEQ/L (ref 8–16)
ANTIBODY SCREEN: NORMAL
APTT PPP: 32.5 SECONDS (ref 22–38)
BUN SERPL-MCNC: 22 MG/DL (ref 7–22)
CALCIUM SERPL-MCNC: 10 MG/DL (ref 8.5–10.5)
CHLORIDE SERPL-SCNC: 100 MEQ/L (ref 98–111)
CO2 SERPL-SCNC: 25 MEQ/L (ref 23–33)
CREAT SERPL-MCNC: 1.1 MG/DL (ref 0.4–1.2)
DEPRECATED RDW RBC AUTO: 47.6 FL (ref 35–45)
ECHO BSA: 1.79 M2
EKG ATRIAL RATE: 66 BPM
EKG ATRIAL RATE: 75 BPM
EKG P AXIS: 42 DEGREES
EKG P AXIS: 58 DEGREES
EKG P-R INTERVAL: 238 MS
EKG P-R INTERVAL: 246 MS
EKG Q-T INTERVAL: 362 MS
EKG Q-T INTERVAL: 404 MS
EKG QRS DURATION: 92 MS
EKG QRS DURATION: 96 MS
EKG QTC CALCULATION (BAZETT): 404 MS
EKG QTC CALCULATION (BAZETT): 423 MS
EKG R AXIS: 56 DEGREES
EKG R AXIS: 67 DEGREES
EKG T AXIS: 20 DEGREES
EKG T AXIS: 34 DEGREES
EKG VENTRICULAR RATE: 66 BPM
EKG VENTRICULAR RATE: 75 BPM
ERYTHROCYTE [DISTWIDTH] IN BLOOD BY AUTOMATED COUNT: 14.9 % (ref 11.5–14.5)
GFR SERPL CREATININE-BSD FRML MDRD: 74 ML/MIN/1.73M2
GLUCOSE SERPL-MCNC: 111 MG/DL (ref 70–108)
HCT VFR BLD AUTO: 43.2 % (ref 42–52)
HGB BLD-MCNC: 14.2 GM/DL (ref 14–18)
INR PPP: 0.97 (ref 0.85–1.13)
MCH RBC QN AUTO: 28.6 PG (ref 26–33)
MCHC RBC AUTO-ENTMCNC: 32.9 GM/DL (ref 32.2–35.5)
MCV RBC AUTO: 86.9 FL (ref 80–94)
PLATELET # BLD AUTO: 337 THOU/MM3 (ref 130–400)
PMV BLD AUTO: 9.7 FL (ref 9.4–12.4)
POTASSIUM SERPL-SCNC: 5.1 MEQ/L (ref 3.5–5.2)
RBC # BLD AUTO: 4.97 MILL/MM3 (ref 4.7–6.1)
SODIUM SERPL-SCNC: 137 MEQ/L (ref 135–145)
WBC # BLD AUTO: 6.6 THOU/MM3 (ref 4.8–10.8)

## 2024-06-04 PROCEDURE — C1769 GUIDE WIRE: HCPCS | Performed by: INTERNAL MEDICINE

## 2024-06-04 PROCEDURE — 6360000002 HC RX W HCPCS: Performed by: INTERNAL MEDICINE

## 2024-06-04 PROCEDURE — 86900 BLOOD TYPING SEROLOGIC ABO: CPT

## 2024-06-04 PROCEDURE — 85730 THROMBOPLASTIN TIME PARTIAL: CPT

## 2024-06-04 PROCEDURE — 2580000003 HC RX 258: Performed by: STUDENT IN AN ORGANIZED HEALTH CARE EDUCATION/TRAINING PROGRAM

## 2024-06-04 PROCEDURE — C9600 PERC DRUG-EL COR STENT SING: HCPCS | Performed by: INTERNAL MEDICINE

## 2024-06-04 PROCEDURE — C1874 STENT, COATED/COV W/DEL SYS: HCPCS | Performed by: INTERNAL MEDICINE

## 2024-06-04 PROCEDURE — 6360000004 HC RX CONTRAST MEDICATION: Performed by: INTERNAL MEDICINE

## 2024-06-04 PROCEDURE — 85027 COMPLETE CBC AUTOMATED: CPT

## 2024-06-04 PROCEDURE — 80048 BASIC METABOLIC PNL TOTAL CA: CPT

## 2024-06-04 PROCEDURE — C1725 CATH, TRANSLUMIN NON-LASER: HCPCS | Performed by: INTERNAL MEDICINE

## 2024-06-04 PROCEDURE — 92928 PRQ TCAT PLMT NTRAC ST 1 LES: CPT | Performed by: INTERNAL MEDICINE

## 2024-06-04 PROCEDURE — 99152 MOD SED SAME PHYS/QHP 5/>YRS: CPT | Performed by: INTERNAL MEDICINE

## 2024-06-04 PROCEDURE — 7100000010 HC PHASE II RECOVERY - FIRST 15 MIN: Performed by: INTERNAL MEDICINE

## 2024-06-04 PROCEDURE — 85610 PROTHROMBIN TIME: CPT

## 2024-06-04 PROCEDURE — 36415 COLL VENOUS BLD VENIPUNCTURE: CPT

## 2024-06-04 PROCEDURE — 86850 RBC ANTIBODY SCREEN: CPT

## 2024-06-04 PROCEDURE — 93005 ELECTROCARDIOGRAM TRACING: CPT | Performed by: STUDENT IN AN ORGANIZED HEALTH CARE EDUCATION/TRAINING PROGRAM

## 2024-06-04 PROCEDURE — 2580000003 HC RX 258: Performed by: INTERNAL MEDICINE

## 2024-06-04 PROCEDURE — 86901 BLOOD TYPING SEROLOGIC RH(D): CPT

## 2024-06-04 PROCEDURE — 6370000000 HC RX 637 (ALT 250 FOR IP): Performed by: INTERNAL MEDICINE

## 2024-06-04 PROCEDURE — C1894 INTRO/SHEATH, NON-LASER: HCPCS | Performed by: INTERNAL MEDICINE

## 2024-06-04 PROCEDURE — 7100000011 HC PHASE II RECOVERY - ADDTL 15 MIN: Performed by: INTERNAL MEDICINE

## 2024-06-04 PROCEDURE — 93010 ELECTROCARDIOGRAM REPORT: CPT | Performed by: NUCLEAR MEDICINE

## 2024-06-04 PROCEDURE — C1887 CATHETER, GUIDING: HCPCS | Performed by: INTERNAL MEDICINE

## 2024-06-04 PROCEDURE — 94761 N-INVAS EAR/PLS OXIMETRY MLT: CPT

## 2024-06-04 PROCEDURE — 93005 ELECTROCARDIOGRAM TRACING: CPT | Performed by: INTERNAL MEDICINE

## 2024-06-04 PROCEDURE — 2709999900 HC NON-CHARGEABLE SUPPLY: Performed by: INTERNAL MEDICINE

## 2024-06-04 PROCEDURE — 85347 COAGULATION TIME ACTIVATED: CPT

## 2024-06-04 PROCEDURE — 2500000003 HC RX 250 WO HCPCS: Performed by: INTERNAL MEDICINE

## 2024-06-04 DEVICE — STENT ONYXNG35030UX ONYX 3.50X30RX
Type: IMPLANTABLE DEVICE | Status: FUNCTIONAL
Brand: ONYX FRONTIER™

## 2024-06-04 RX ORDER — SODIUM CHLORIDE 9 MG/ML
INJECTION, SOLUTION INTRAVENOUS PRN
Status: DISCONTINUED | OUTPATIENT
Start: 2024-06-04 | End: 2024-06-04 | Stop reason: HOSPADM

## 2024-06-04 RX ORDER — NITROGLYCERIN 0.4 MG/1
0.4 TABLET SUBLINGUAL EVERY 5 MIN PRN
Status: DISCONTINUED | OUTPATIENT
Start: 2024-06-04 | End: 2024-06-04 | Stop reason: HOSPADM

## 2024-06-04 RX ORDER — SODIUM CHLORIDE 0.9 % (FLUSH) 0.9 %
5-40 SYRINGE (ML) INJECTION EVERY 12 HOURS SCHEDULED
Status: DISCONTINUED | OUTPATIENT
Start: 2024-06-04 | End: 2024-06-05 | Stop reason: HOSPADM

## 2024-06-04 RX ORDER — ALBUTEROL SULFATE 90 UG/1
2 AEROSOL, METERED RESPIRATORY (INHALATION) 4 TIMES DAILY PRN
Status: DISCONTINUED | OUTPATIENT
Start: 2024-06-04 | End: 2024-06-05 | Stop reason: HOSPADM

## 2024-06-04 RX ORDER — SODIUM CHLORIDE 0.9 % (FLUSH) 0.9 %
5-40 SYRINGE (ML) INJECTION PRN
Status: DISCONTINUED | OUTPATIENT
Start: 2024-06-04 | End: 2024-06-05 | Stop reason: HOSPADM

## 2024-06-04 RX ORDER — ROSUVASTATIN CALCIUM 20 MG/1
20 TABLET, COATED ORAL NIGHTLY
Status: DISCONTINUED | OUTPATIENT
Start: 2024-06-04 | End: 2024-06-05 | Stop reason: HOSPADM

## 2024-06-04 RX ORDER — SODIUM CHLORIDE 9 MG/ML
INJECTION, SOLUTION INTRAVENOUS CONTINUOUS
Status: ACTIVE | OUTPATIENT
Start: 2024-06-04 | End: 2024-06-05

## 2024-06-04 RX ORDER — OMEPRAZOLE 20 MG/1
20 CAPSULE, DELAYED RELEASE ORAL DAILY
Status: DISCONTINUED | OUTPATIENT
Start: 2024-06-05 | End: 2024-06-05 | Stop reason: HOSPADM

## 2024-06-04 RX ORDER — FLUTICASONE PROPIONATE 50 MCG
2 SPRAY, SUSPENSION (ML) NASAL DAILY
Status: DISCONTINUED | OUTPATIENT
Start: 2024-06-05 | End: 2024-06-05 | Stop reason: HOSPADM

## 2024-06-04 RX ORDER — SODIUM CHLORIDE 0.9 % (FLUSH) 0.9 %
5-40 SYRINGE (ML) INJECTION EVERY 12 HOURS SCHEDULED
Status: DISCONTINUED | OUTPATIENT
Start: 2024-06-04 | End: 2024-06-04 | Stop reason: HOSPADM

## 2024-06-04 RX ORDER — NITROGLYCERIN 0.4 MG/1
0.4 TABLET SUBLINGUAL EVERY 5 MIN PRN
Status: DISCONTINUED | OUTPATIENT
Start: 2024-06-04 | End: 2024-06-05 | Stop reason: HOSPADM

## 2024-06-04 RX ORDER — HEPARIN SODIUM 1000 [USP'U]/ML
INJECTION, SOLUTION INTRAVENOUS; SUBCUTANEOUS PRN
Status: DISCONTINUED | OUTPATIENT
Start: 2024-06-04 | End: 2024-06-04 | Stop reason: HOSPADM

## 2024-06-04 RX ORDER — PHENYLEPHRINE HCL IN 0.9% NACL 1 MG/10 ML
VIAL (ML) INTRAVENOUS PRN
Status: DISCONTINUED | OUTPATIENT
Start: 2024-06-04 | End: 2024-06-04 | Stop reason: HOSPADM

## 2024-06-04 RX ORDER — FENTANYL CITRATE 50 UG/ML
INJECTION, SOLUTION INTRAMUSCULAR; INTRAVENOUS PRN
Status: DISCONTINUED | OUTPATIENT
Start: 2024-06-04 | End: 2024-06-04 | Stop reason: HOSPADM

## 2024-06-04 RX ORDER — MIDAZOLAM HYDROCHLORIDE 1 MG/ML
INJECTION INTRAMUSCULAR; INTRAVENOUS PRN
Status: DISCONTINUED | OUTPATIENT
Start: 2024-06-04 | End: 2024-06-04 | Stop reason: HOSPADM

## 2024-06-04 RX ORDER — ALBUTEROL SULFATE 2.5 MG/3ML
2.5 SOLUTION RESPIRATORY (INHALATION) EVERY 6 HOURS PRN
Status: DISCONTINUED | OUTPATIENT
Start: 2024-06-04 | End: 2024-06-05 | Stop reason: HOSPADM

## 2024-06-04 RX ORDER — ASPIRIN 325 MG
325 TABLET ORAL ONCE
Status: DISCONTINUED | OUTPATIENT
Start: 2024-06-04 | End: 2024-06-04 | Stop reason: HOSPADM

## 2024-06-04 RX ORDER — AMLODIPINE BESYLATE 5 MG/1
5 TABLET ORAL DAILY
Status: DISCONTINUED | OUTPATIENT
Start: 2024-06-05 | End: 2024-06-05 | Stop reason: HOSPADM

## 2024-06-04 RX ORDER — SODIUM CHLORIDE 9 MG/ML
INJECTION, SOLUTION INTRAVENOUS PRN
Status: DISCONTINUED | OUTPATIENT
Start: 2024-06-04 | End: 2024-06-05 | Stop reason: HOSPADM

## 2024-06-04 RX ORDER — PANTOPRAZOLE SODIUM 40 MG/1
40 TABLET, DELAYED RELEASE ORAL
Status: DISCONTINUED | OUTPATIENT
Start: 2024-06-05 | End: 2024-06-04

## 2024-06-04 RX ORDER — DIPHENHYDRAMINE HYDROCHLORIDE 50 MG/ML
50 INJECTION INTRAMUSCULAR; INTRAVENOUS ONCE
Status: DISCONTINUED | OUTPATIENT
Start: 2024-06-04 | End: 2024-06-04 | Stop reason: HOSPADM

## 2024-06-04 RX ORDER — SODIUM CHLORIDE 0.9 % (FLUSH) 0.9 %
5-40 SYRINGE (ML) INJECTION PRN
Status: DISCONTINUED | OUTPATIENT
Start: 2024-06-04 | End: 2024-06-04 | Stop reason: HOSPADM

## 2024-06-04 RX ORDER — CLOPIDOGREL BISULFATE 75 MG/1
75 TABLET ORAL DAILY
Status: DISCONTINUED | OUTPATIENT
Start: 2024-06-05 | End: 2024-06-05 | Stop reason: HOSPADM

## 2024-06-04 RX ORDER — ASPIRIN 81 MG/1
81 TABLET ORAL DAILY
Status: DISCONTINUED | OUTPATIENT
Start: 2024-06-05 | End: 2024-06-05 | Stop reason: HOSPADM

## 2024-06-04 RX ORDER — ACETAMINOPHEN 325 MG/1
650 TABLET ORAL EVERY 4 HOURS PRN
Status: DISCONTINUED | OUTPATIENT
Start: 2024-06-04 | End: 2024-06-05 | Stop reason: HOSPADM

## 2024-06-04 RX ADMIN — ACETAMINOPHEN 650 MG: 325 TABLET ORAL at 20:22

## 2024-06-04 RX ADMIN — SODIUM CHLORIDE: 9 INJECTION, SOLUTION INTRAVENOUS at 20:17

## 2024-06-04 RX ADMIN — SODIUM CHLORIDE, PRESERVATIVE FREE 10 ML: 5 INJECTION INTRAVENOUS at 10:20

## 2024-06-04 RX ADMIN — Medication 12.5 MG: at 20:08

## 2024-06-04 RX ADMIN — SODIUM CHLORIDE, PRESERVATIVE FREE 10 ML: 5 INJECTION INTRAVENOUS at 20:11

## 2024-06-04 RX ADMIN — ROSUVASTATIN CALCIUM 20 MG: 20 TABLET, COATED ORAL at 20:08

## 2024-06-04 RX ADMIN — SODIUM CHLORIDE: 9 INJECTION, SOLUTION INTRAVENOUS at 10:19

## 2024-06-04 ASSESSMENT — PAIN DESCRIPTION - PAIN TYPE: TYPE: ACUTE PAIN

## 2024-06-04 ASSESSMENT — PAIN DESCRIPTION - LOCATION: LOCATION: HEAD

## 2024-06-04 ASSESSMENT — PAIN - FUNCTIONAL ASSESSMENT: PAIN_FUNCTIONAL_ASSESSMENT: ACTIVITIES ARE NOT PREVENTED

## 2024-06-04 ASSESSMENT — PAIN SCALES - GENERAL
PAINLEVEL_OUTOF10: 5
PAINLEVEL_OUTOF10: 2

## 2024-06-04 ASSESSMENT — PAIN DESCRIPTION - DESCRIPTORS: DESCRIPTORS: ACHING

## 2024-06-04 ASSESSMENT — PAIN DESCRIPTION - FREQUENCY: FREQUENCY: INTERMITTENT

## 2024-06-04 ASSESSMENT — PAIN DESCRIPTION - ONSET: ONSET: GRADUAL

## 2024-06-04 NOTE — H&P
[facility]  Sedation/Analgesia History & Physical    Pt Name: [place]  Account number: [id number]  MRN: [id number]  YOB: 1960  Provider Performing Procedure: Karlos Vanegas MD MD  Referring Provider: Karlos Vanegas MD   Primary Care Physician: Marsha Ireland MD  Date: 6/4/2024    PRE-PROCEDURE    Code Status: FULL CODE  Brief History/Pre-Procedure Diagnosis:   Angina, Abnormal stress test, residual CAD with abnormal FFR of LAD   Consent: : I have discussed with the patient risks, benefits, and alternatives (and relevant risks, benefits, and side effects related to alternatives or not receiving care), and likelihood of the success.   The patient and/or representative appear to understand and agree to proceed.  The discussion encompasses risks, benefits, and side effects related to the alternatives and the risks related to not receiving the proposed care, treatment, and services.     The indication, risks and benefits of the procedure and possible therapeutic consequences and alternatives were discussed with the patient. The patient was given the opportunity to ask questions and to have them answered to his/her satisfaction. Risks of the procedure include but are not limited to the following: Bleeding, hematoma including retroperitoneal hemmorhage, infection, pain and discomfort, injury to the aorta and other blood vessels, rhythm disturbance, low blood pressure, myocardial infarction, stroke, kidney damage/failure, myocardial perforation, allergic reactions to sedatives/contrast material, loss of pulse/vascular compromise requiring surgery, aneurysm/pseudoaneurysm formation, possible loss of a limb/hand/leg, needing blood transfusion, requiring emergent open heart surgery or emergent coronary intervention, the need for intubation/respiratory support, the requirement for defibrillation/cardioversion, and death. Alternatives to and omission of the suggested procedure were

## 2024-06-04 NOTE — RT PROTOCOL NOTE
RT Inhaler-Nebulizer Bronchodilator Protocol Note    There is a bronchodilator order in the chart from a provider indicating to follow the RT Bronchodilator Protocol and there is an “Initiate RT Inhaler-Nebulizer Bronchodilator Protocol” order as well (see protocol at bottom of note).    CXR Findings:  No results found.    The findings from the last RT Protocol Assessment were as follows:   History Pulmonary Disease: Chronic pulmonary disease  Respiratory Pattern: Regular pattern and RR 12-20 bpm  Breath Sounds: Clear breath sounds  Cough: Strong, spontaneous, non-productive  Indication for Bronchodilator Therapy: On home bronchodilators (Patient takes albuterol inhalers as needed at home)  Bronchodilator Assessment Score: 2    Aerosolized bronchodilator medication orders have been revised according to the RT Inhaler-Nebulizer Bronchodilator Protocol below.    Respiratory Therapist to perform RT Therapy Protocol Assessment initially then follow the protocol.  Repeat RT Therapy Protocol Assessment PRN for score 0-3 or on second treatment, BID, and PRN for scores above 3.    No Indications - adjust the frequency to every 6 hours PRN wheezing or bronchospasm, if no treatments needed after 48 hours then discontinue using Per Protocol order mode.     If indication present, adjust the RT bronchodilator orders based on the Bronchodilator Assessment Score as indicated below.  Use Inhaler orders unless patient has one or more of the following: on home nebulizer, not able to hold breath for 10 seconds, is not alert and oriented, cannot activate and use MDI correctly, or respiratory rate 25 breaths per minute or more, then use the equivalent nebulizer order(s) with same Frequency and PRN reasons based on the score.  If a patient is on this medication at home then do not decrease Frequency below that used at home.    0-3 - enter or revise RT bronchodilator order(s) to equivalent RT Bronchodilator order with Frequency of every 4  hours PRN for wheezing or increased work of breathing using Per Protocol order mode.        4-6 - enter or revise RT Bronchodilator order(s) to two equivalent RT bronchodilator orders with one order with BID Frequency and one order with Frequency of every 4 hours PRN wheezing or increased work of breathing using Per Protocol order mode.        7-10 - enter or revise RT Bronchodilator order(s) to two equivalent RT bronchodilator orders with one order with TID Frequency and one order with Frequency of every 4 hours PRN wheezing or increased work of breathing using Per Protocol order mode.       11-13 - enter or revise RT Bronchodilator order(s) to one equivalent RT bronchodilator order with QID Frequency and an Albuterol order with Frequency of every 4 hours PRN wheezing or increased work of breathing using Per Protocol order mode.      Greater than 13 - enter or revise RT Bronchodilator order(s) to one equivalent RT bronchodilator order with every 4 hours Frequency and an Albuterol order with Frequency of every 2 hours PRN wheezing or increased work of breathing using Per Protocol order mode.     RT to enter RT Home Evaluation for COPD & MDI Assessment order using Per Protocol order mode.    Electronically signed by Tg Lugo RCP on 6/4/2024 at 6:26 PM

## 2024-06-04 NOTE — PROGRESS NOTES
0934 Patient admitted to 2E09  Ambulatory for PTCA. Patient had stents/MI in May, Rt groin and upper thigh bruised, bruise is fading.   Patient NPO. Patient accompanied by wife.  Vital signs obtained.   Assessment and data collection intiated.   Oriented to room.  Policies and procedures for 2E explained.   All questions answered with no further questions at this time.   Fall prevention and safety precautions discussed with patient.         0935 Care plan reviewed with patient and wife.  Patient and wife verbalize understanding of the plan of care and contribute to goal setting.       1110 updated patient cath lab running behind schedule.     1303  To cath lab per bed, stable condition.         1349 Care taken over from cath lab. Radial site stable, no bleeding seen, site soft.  Armboard continued with vascband. Patient instructed not to bend wrist, not to put pressure on wrist and not to lift  or twist with wrist. Patient voices understanding.         1638 Patient received and reviewed booklet \"How to care for your heart after coronary artery disease\". Reviewed how to take care of the incision site, the importance of participating in cardiac rehab and the hours of operations. Reviewed importance of reducing coronary artery disease risk factors.   Stent card given to patient.     Bandaid applied to site at 1715 and vascband removed, armboard continued, site stable.   Patient instructed not to bend, twist, lift, push or pull with right wrist, voices understanding.     1726 to 8a10 per wheelchair, stable condition, patient to let wife know new room number. All belongings with patient.

## 2024-06-05 VITALS
SYSTOLIC BLOOD PRESSURE: 127 MMHG | DIASTOLIC BLOOD PRESSURE: 76 MMHG | BODY MASS INDEX: 25.47 KG/M2 | OXYGEN SATURATION: 98 % | TEMPERATURE: 97.9 F | HEART RATE: 83 BPM | RESPIRATION RATE: 16 BRPM | HEIGHT: 66 IN | WEIGHT: 158.51 LBS

## 2024-06-05 LAB
ANION GAP SERPL CALC-SCNC: 7 MEQ/L (ref 8–16)
BUN SERPL-MCNC: 17 MG/DL (ref 7–22)
CALCIUM SERPL-MCNC: 9 MG/DL (ref 8.5–10.5)
CHLORIDE SERPL-SCNC: 108 MEQ/L (ref 98–111)
CO2 SERPL-SCNC: 24 MEQ/L (ref 23–33)
CREAT SERPL-MCNC: 1 MG/DL (ref 0.4–1.2)
DEPRECATED RDW RBC AUTO: 47.7 FL (ref 35–45)
ERYTHROCYTE [DISTWIDTH] IN BLOOD BY AUTOMATED COUNT: 15 % (ref 11.5–14.5)
GFR SERPL CREATININE-BSD FRML MDRD: 83 ML/MIN/1.73M2
GLUCOSE SERPL-MCNC: 98 MG/DL (ref 70–108)
HCT VFR BLD AUTO: 39.4 % (ref 42–52)
HGB BLD-MCNC: 12.8 GM/DL (ref 14–18)
MCH RBC QN AUTO: 28.1 PG (ref 26–33)
MCHC RBC AUTO-ENTMCNC: 32.5 GM/DL (ref 32.2–35.5)
MCV RBC AUTO: 86.4 FL (ref 80–94)
PLATELET # BLD AUTO: 276 THOU/MM3 (ref 130–400)
PMV BLD AUTO: 9.8 FL (ref 9.4–12.4)
POTASSIUM SERPL-SCNC: 4.2 MEQ/L (ref 3.5–5.2)
RBC # BLD AUTO: 4.56 MILL/MM3 (ref 4.7–6.1)
SODIUM SERPL-SCNC: 139 MEQ/L (ref 135–145)
WBC # BLD AUTO: 5.5 THOU/MM3 (ref 4.8–10.8)

## 2024-06-05 PROCEDURE — 85027 COMPLETE CBC AUTOMATED: CPT

## 2024-06-05 PROCEDURE — APPNB30 APP NON BILLABLE TIME 0-30 MINS: Performed by: PHYSICIAN ASSISTANT

## 2024-06-05 PROCEDURE — 80048 BASIC METABOLIC PNL TOTAL CA: CPT

## 2024-06-05 PROCEDURE — 36415 COLL VENOUS BLD VENIPUNCTURE: CPT

## 2024-06-05 RX ORDER — PANTOPRAZOLE SODIUM 20 MG/1
20 TABLET, DELAYED RELEASE ORAL DAILY
Qty: 30 TABLET | Refills: 1 | Status: SHIPPED | OUTPATIENT
Start: 2024-06-05

## 2024-06-05 RX ADMIN — CLOPIDOGREL BISULFATE 75 MG: 75 TABLET ORAL at 08:11

## 2024-06-05 RX ADMIN — ASPIRIN 81 MG: 81 TABLET ORAL at 08:10

## 2024-06-05 RX ADMIN — OMEPRAZOLE 20 MG: 20 CAPSULE, DELAYED RELEASE ORAL at 05:42

## 2024-06-05 RX ADMIN — AMLODIPINE BESYLATE 5 MG: 5 TABLET ORAL at 08:09

## 2024-06-05 RX ADMIN — Medication 12.5 MG: at 08:10

## 2024-06-05 NOTE — CARDIO/PULMONARY
Cardiac Rehab:  Patient educated on the need for Cardiac Rehab due to his coronary PCI/Stent. Explained the importance of participating and hours of operation. Informed him that the Cardiac Rehab staff will follow-up with him after discharge and provide further information regarding the program and hours of operation. For questions or information, call Cardiac Rehab at [phone].

## 2024-06-05 NOTE — PLAN OF CARE
Problem: ABCDS Injury Assessment  Goal: Absence of physical injury  Outcome: Progressing  Flowsheets (Taken 6/4/2024 2203)  Absence of Physical Injury: Implement safety measures based on patient assessment  Note: Bed in low position  Call light in reach  Bed wheel lock  Teaching to use call light for assistance.       Problem: Discharge Planning  Goal: Discharge to home or other facility with appropriate resources  Outcome: Progressing  Flowsheets (Taken 6/4/2024 2203)  Discharge to home or other facility with appropriate resources:   Identify barriers to discharge with patient and caregiver   Arrange for needed discharge resources and transportation as appropriate   Identify discharge learning needs (meds, wound care, etc)   Refer to discharge planning if patient needs post-hospital services based on physician order or complex needs related to functional status, cognitive ability or social support system  Note: Feedback readiness for discharge.  Promoting inclusion for discharge planning.        Problem: Pain  Goal: Verbalizes/displays adequate comfort level or baseline comfort level  Outcome: Progressing  Flowsheets (Taken 6/4/2024 2203)  Verbalizes/displays adequate comfort level or baseline comfort level:   Encourage patient to monitor pain and request assistance   Assess pain using appropriate pain scale   Administer analgesics based on type and severity of pain and evaluate response   Implement non-pharmacological measures as appropriate and evaluate response   Notify Licensed Independent Practitioner if interventions unsuccessful or patient reports new pain  Note: Educate patient on pain control.  Educate patient on acceptable pain level with chronic pain.   Talk to patient about non-pharmaceutical pain interventions.

## 2024-06-05 NOTE — PROGRESS NOTES
Cardiology Progress Note      Patient:  [place] Nettles  YOB: 1960  MRN: [id number]   Acct: [id number]  Admit Date:  6/4/2024  Primary Cardiologist:  dr rich    Here for OP cath    Subjective (Events in last 24 hours): pt awake and alert.  NAD. No cp or sob.no edema or orthopnea  No complaints  On RA      Objective:   BP (!) 141/82   Pulse 62   Temp 97.8 °F (36.6 °C) (Oral)   Resp 18   Ht 1.676 m (5' 6\")   Wt 71.9 kg (158 lb 8.2 oz)   SpO2 98%   BMI 25.58 kg/m²        TELEMETRY: nsr    Physical Exam:  General Appearance: alert and oriented to person, place and time, in no acute distress  Cardiovascular: normal rate, regular rhythm, normal S1 and S2, no murmurs, rubs, clicks, or gallops, distal pulses intact, no carotid bruits, no JVD  Pulmonary/Chest: clear to auscultation bilaterally- no wheezes, rales or rhonchi, normal air movement, no respiratory distress  Abdomen: soft, non-tender, non-distended, normal bowel sounds, no masses Extremities: no cyanosis, clubbing or edema, pulse   Skin: warm and dry, no rash or erythema  Head: normocephalic and atraumatic  Eyes: pupils equal, round, and reactive to light  Neck: supple and non-tender without mass, no thyromegaly   Musculoskeletal: normal range of motion, no joint swelling, deformity or tenderness  Neurological: alert, oriented, normal speech, no focal findings or movement disorder noted  Right wrist - no hematoma, +2 radial pulse    Medications:    fluticasone  2 spray Each Nostril Daily    aspirin  81 mg Oral Daily    lisinopril  30 mg Oral Daily    amLODIPine  5 mg Oral Daily    metoprolol tartrate  12.5 mg Oral BID    rosuvastatin  20 mg Oral Nightly    clopidogrel  75 mg Oral Daily    sodium chloride flush  5-40 mL IntraVENous 2 times per day    omeprazole  20 mg Oral Daily    umeclidinium-vilanterol  1 puff Inhalation Daily      sodium chloride       albuterol sulfate HFA, 2 puff, 4x Daily PRN  albuterol, 2.5 mg, Q6H

## 2024-06-05 NOTE — DISCHARGE INSTRUCTIONS
Cardiac Rehab:  Patient educated on the need for Cardiac Rehab and the importance of participating. Cardiac Rehab staff will follow-up with patient and provide further information regarding the program and hours of operation. For questions or information, call Cardiac Rehab at [phone].        Discharge Instructions for Radial Heart Catherization    1.  Take it easy for 3-4 days.  2.  No driving for 2 days.  3.  No lifting of 5 lbs or more for 5 days with the affected arm.  4.  Can shower after 24 hours.  5.  Remove arm board after 24 hours.  6.  Apply a band aid to the insertion site daily for 5 days.  May apply antibiotic ointment if desired, but not necessary.  Wash site daily with soap and water.  7.  No creams, ointments, or powders near the insertion site.   8.  No tub baths, swimming, hot tubs, or hand washing dishes for 1 week.  9.  Watch for signs of infection (redness, warmth, swelling, or pus drainage) or coolness of extremity and call physician if this occurs  10.  If bleeding occurs from insertion site, apply pressure and call 911.       F/up dr rich 2 weeks  F/up PCP 1 week

## 2024-06-06 ENCOUNTER — TELEPHONE (OUTPATIENT)
Dept: FAMILY MEDICINE CLINIC | Age: 64
End: 2024-06-06

## 2024-06-06 NOTE — TELEPHONE ENCOUNTER
Care Transitions Initial Follow Up Call    Outreach made within 2 business days of discharge: Yes    Patient: [place] Patient : 1960   MRN: [id number]  Reason for Admission: There are no discharge diagnoses documented for the most recent discharge.  Discharge Date: 24       Spoke with: patient    Discharge department/facility: [facility] Interactive Patient Contact:  Was patient able to fill all prescriptions: Yes  Was patient instructed to bring all medications to the follow-up visit: Yes  Is patient taking all medications as directed in the discharge summary? Yes  Does patient understand their discharge instructions: Yes  Does patient have questions or concerns that need addressed prior to 7-14 day follow up office visit: no    Scheduled appointment with PCP within 7-14 days    Follow Up  Future Appointments   Date Time Provider Department Center   2024  4:40 PM Marsha Ireland MD [facility] [place]   2024  8:00 PM SCHEDULE, [facility] SLEEP TECH 01 [facility] SLEEP [place] [facility]   2024 11:00 AM Guy Graf, PAJorgeC N [facility] [place]   2024 10:20 AM Neema Solomon APRN - CNP N Pul[facility] [place]   9/3/2024 10:00 AM Lali Madera MD N [facility]m [facility] [place]   10/8/2024 10:20 AM Marsha Ireland MD [facility] [place]   2025  1:30 PM Karlos Vanegas MD N [facility] [place]       Joy Vera LPN

## 2024-06-10 ENCOUNTER — TELEPHONE (OUTPATIENT)
Dept: FAMILY MEDICINE CLINIC | Age: 64
End: 2024-06-10

## 2024-06-10 NOTE — TELEPHONE ENCOUNTER
With plavix and asa, bleeding risk is high; so nsaids not good idea. Also, Mobic and other nsaids used chronically increase CV event risk.  Alternatives include tylenol 650 mg 2-3 times a day.  There are others, however, we would want to discuss options in detail due to potential SE.      Future Appointments   Date Time Provider Department Center   6/12/2024  4:40 PM Marsha Ireland MD [facility] [place]   6/14/2024  8:00 PM SCHEDULE, [facility]EP TECH [facility]   6/19/2024 11:00 AM Guy Graf PA-C N [facility] Heart P - [place]   6/25/2024 10:20 AM Neema Solomon APRN - CNP N Pulm Med P - [place]   9/3/2024 10:00 AM Lali Madera MD N Pulm Med P - [place]   10/8/2024 10:20 AM Marsha Ireland MD [facility] - [place]   4/30/2025  1:30 PM Karlos Vanegas MD N [facility] - [place]

## 2024-06-10 NOTE — TELEPHONE ENCOUNTER
Patient states mobic was discontinued   Had MI and stents placed 6/6/24  Requesting a refill or something alternative as he is having a lot of arthritic pain  He has hospital follow up 6/12/24

## 2024-06-12 ENCOUNTER — OFFICE VISIT (OUTPATIENT)
Dept: FAMILY MEDICINE CLINIC | Age: 64
End: 2024-06-12

## 2024-06-12 VITALS
DIASTOLIC BLOOD PRESSURE: 72 MMHG | BODY MASS INDEX: 25.39 KG/M2 | HEART RATE: 65 BPM | RESPIRATION RATE: 16 BRPM | SYSTOLIC BLOOD PRESSURE: 122 MMHG | HEIGHT: 66 IN | WEIGHT: 158 LBS

## 2024-06-12 DIAGNOSIS — I10 PRIMARY HYPERTENSION: ICD-10-CM

## 2024-06-12 DIAGNOSIS — Z09 HOSPITAL DISCHARGE FOLLOW-UP: Primary | ICD-10-CM

## 2024-06-12 DIAGNOSIS — J44.9 COPD, MILD (HCC): ICD-10-CM

## 2024-06-12 DIAGNOSIS — I25.119 ATHEROSCLEROSIS OF NATIVE CORONARY ARTERY OF NATIVE HEART WITH ANGINA PECTORIS (HCC): ICD-10-CM

## 2024-06-12 DIAGNOSIS — R06.02 SOB (SHORTNESS OF BREATH): ICD-10-CM

## 2024-06-12 NOTE — PROGRESS NOTES
Post-Discharge Transitional Care  Follow Up      South Nettles   YOB: 1960    Date of Office Visit:  6/12/2024  Date of Hospital Admission: 6/4/24  Date of Hospital Discharge: 6/5/24  Risk of hospital readmission (high >=14%. Medium >=10%) :Readmission Risk Score: 11.6      Care management risk score Rising risk (score 2-5) and Complex Care (Scores >=6): No Risk Score On File     Non face to face  following discharge, date last encounter closed (first attempt may have been earlier): 06/06/2024    Call initiated 2 business days of discharge: Yes    ASSESSMENT/PLAN:   Hospital discharge follow-up  -     MA DISCHARGE MEDS RECONCILED W/ CURRENT OUTPATIENT MED LIST  SOB (shortness of breath)  Atherosclerosis of native coronary artery of native heart with angina pectoris (HCC)  COPD, mild (HCC)  Primary hypertension    Improved but not to baseline  Evaluation of cardiac and pulmonary causes has been done  Stay off cigarettes  Encouraged IS and deep breathing exercises  Increase activity gently but steadily  Sleep study to be done  Reviewed healthy diet     Medical Decision Making: moderate complexity  Return for 10/8/24.           Subjective:   HPI:  Follow up of Hospital problems/diagnosis(es):   Encounter Diagnoses   Name Primary?    Hospital discharge follow-up Yes    SOB (shortness of breath)     Atherosclerosis of native coronary artery of native heart with angina pectoris (HCC)     COPD, mild (HCC)     Primary hypertension         Inpatient course: Discharge summary reviewed- see chart.    Interval history/Current status:     Patient was admitted to the hospital on 6/4/2024.  He also was admitted on 5/24 (STEMI), 4/25 (cOPD), 4/7 (COPD).  Breathing not normal. Tight still. Hard to take in a deep breath.  Albuterol infrequently use. Not used weekly. 2 x in last 2 weeks  Off meloxicam. Pain is okay now but was worse.  Before bedtime feels tight.  Insentive spirometry not used and he is not sure if

## 2024-06-14 ENCOUNTER — HOSPITAL ENCOUNTER (OUTPATIENT)
Dept: SLEEP CENTER | Age: 64
End: 2024-06-14
Payer: MEDICARE

## 2024-06-14 DIAGNOSIS — I20.9 ANGINA PECTORIS, UNSPECIFIED (HCC): ICD-10-CM

## 2024-06-14 DIAGNOSIS — R06.83 SNORING: ICD-10-CM

## 2024-06-14 DIAGNOSIS — J44.9 STAGE 1 MILD COPD BY GOLD CLASSIFICATION (HCC): ICD-10-CM

## 2024-06-14 DIAGNOSIS — R06.81 APNEA: ICD-10-CM

## 2024-06-14 DIAGNOSIS — G47.10 HYPERSOMNIA: ICD-10-CM

## 2024-06-14 PROCEDURE — 95810 POLYSOM 6/> YRS 4/> PARAM: CPT

## 2024-06-15 DIAGNOSIS — G47.34 NOCTURNAL HYPOXIA: ICD-10-CM

## 2024-06-15 DIAGNOSIS — J44.9 STAGE 1 MILD COPD BY GOLD CLASSIFICATION (HCC): Primary | ICD-10-CM

## 2024-06-15 PROBLEM — R94.39 ABNORMAL STRESS TEST: Status: RESOLVED | Noted: 2024-05-15 | Resolved: 2024-06-15

## 2024-06-15 PROBLEM — F17.200 TOBACCO USE DISORDER: Status: RESOLVED | Noted: 2024-05-11 | Resolved: 2024-06-15

## 2024-06-15 PROBLEM — I20.9 ANGINA PECTORIS, UNSPECIFIED (HCC): Status: RESOLVED | Noted: 2024-05-07 | Resolved: 2024-06-15

## 2024-06-15 PROBLEM — R94.39 ABNORMAL NUCLEAR STRESS TEST: Status: RESOLVED | Noted: 2024-05-24 | Resolved: 2024-06-15

## 2024-06-16 DIAGNOSIS — K21.9 GASTROESOPHAGEAL REFLUX DISEASE WITHOUT ESOPHAGITIS: ICD-10-CM

## 2024-06-17 RX ORDER — OMEPRAZOLE 20 MG/1
20 CAPSULE, DELAYED RELEASE ORAL DAILY
Qty: 90 CAPSULE | Refills: 0 | OUTPATIENT
Start: 2024-06-17

## 2024-06-18 ENCOUNTER — TELEPHONE (OUTPATIENT)
Dept: CARDIAC REHAB | Age: 64
End: 2024-06-18

## 2024-06-18 DIAGNOSIS — K21.9 GASTROESOPHAGEAL REFLUX DISEASE WITHOUT ESOPHAGITIS: Primary | ICD-10-CM

## 2024-06-18 RX ORDER — PANTOPRAZOLE SODIUM 20 MG/1
20 TABLET, DELAYED RELEASE ORAL DAILY
Qty: 90 TABLET | Refills: 1 | Status: SHIPPED | OUTPATIENT
Start: 2024-06-18

## 2024-06-18 NOTE — TELEPHONE ENCOUNTER
[place] called requesting a refill on the following medications:  Requested Prescriptions     Pending Prescriptions Disp Refills    pantoprazole (PROTONIX) 20 MG tablet 30 tablet 1     Sig: Take 1 tablet by mouth daily       Date of last visit: 6/12/2024  Date of next visit (if applicable):10/8/2024  Date of last refill: 06/05/24  Pharmacy Name: [facility],  Analisa Tyson LPN

## 2024-06-19 ENCOUNTER — OFFICE VISIT (OUTPATIENT)
Dept: CARDIOLOGY CLINIC | Age: 64
End: 2024-06-19
Payer: MEDICARE

## 2024-06-19 VITALS
DIASTOLIC BLOOD PRESSURE: 72 MMHG | BODY MASS INDEX: 24.74 KG/M2 | HEIGHT: 67 IN | WEIGHT: 157.6 LBS | HEART RATE: 69 BPM | SYSTOLIC BLOOD PRESSURE: 105 MMHG

## 2024-06-19 DIAGNOSIS — Z95.820 S/P ANGIOPLASTY WITH STENT: ICD-10-CM

## 2024-06-19 DIAGNOSIS — I10 PRIMARY HYPERTENSION: Primary | ICD-10-CM

## 2024-06-19 DIAGNOSIS — I25.10 CAD IN NATIVE ARTERY: ICD-10-CM

## 2024-06-19 PROCEDURE — 3078F DIAST BP <80 MM HG: CPT | Performed by: PHYSICIAN ASSISTANT

## 2024-06-19 PROCEDURE — 1123F ACP DISCUSS/DSCN MKR DOCD: CPT | Performed by: PHYSICIAN ASSISTANT

## 2024-06-19 PROCEDURE — 93000 ELECTROCARDIOGRAM COMPLETE: CPT | Performed by: PHYSICIAN ASSISTANT

## 2024-06-19 PROCEDURE — 99214 OFFICE O/P EST MOD 30 MIN: CPT | Performed by: PHYSICIAN ASSISTANT

## 2024-06-19 PROCEDURE — 3074F SYST BP LT 130 MM HG: CPT | Performed by: PHYSICIAN ASSISTANT

## 2024-06-19 NOTE — PROGRESS NOTES
Follow up.  Patient had a cardiac catheterization on 05/24/2024 (4 stents) and 06/04/2024 (1). He had 5 stents placed in total.   Reports minimal chest pain a few times since the cath's, and shortness of breath.  Denies palpitations, dizziness, and edema.     
minutes as needed for Chest pain up to max of 3 total doses. If no relief after 1 dose, call 911. 25 tablet 1    albuterol sulfate HFA (VENTOLIN HFA) 108 (90 Base) MCG/ACT inhaler Inhale 2 puffs into the lungs 4 times daily as needed for Wheezing 8 g 5    lisinopril (PRINIVIL;ZESTRIL) 30 MG tablet Take 1 tablet by mouth daily 90 tablet 3    amLODIPine (NORVASC) 5 MG tablet Take 1 tablet by mouth daily 90 tablet 3    umeclidinium-vilanterol (ANORO ELLIPTA) 62.5-25 MCG/ACT inhaler Inhale 1 puff into the lungs daily 30 each 11    albuterol (PROVENTIL) (2.5 MG/3ML) 0.083% nebulizer solution Take 3 mLs by nebulization every 6 hours as needed for Wheezing or Shortness of Breath 120 each 11    aspirin 81 MG EC tablet Take 1 tablet by mouth daily 90 tablet 0    fluticasone (FLONASE) 50 MCG/ACT nasal spray 2 sprays by Each Nostril route daily 1 each 11    Respiratory Therapy Supplies (NEBULIZER/TUBING/MOUTHPIECE) KIT 1 kit by Does not apply route in the morning, at noon, in the evening, and at bedtime 1 kit 0     No current facility-administered medications for this visit.       Social History     Socioeconomic History    Marital status:      Spouse name: Jolly    Number of children: 8    Years of education: 9    Highest education level: GED or equivalent   Occupational History    Occupation: [employer]     Employer: [employer]   Tobacco Use    Smoking status: Former     Current packs/day: 0.00     Average packs/day: 1.5 packs/day for 41.1 years (61.6 ttl pk-yrs)     Types: Cigarettes     Start date: 1983     Quit date: 3/18/2024     Years since quittin.2    Smokeless tobacco: Never   Vaping Use    Vaping Use: Never used   Substance and Sexual Activity    Alcohol use: Yes     Alcohol/week: 6.0 - 12.0 standard drinks of alcohol     Types: 6 - 12 Cans of beer per week     Comment: occasionally    Drug use: No    Sexual activity: Not Currently     Social Determinants of Health     Financial Resource

## 2024-06-20 ENCOUNTER — TELEPHONE (OUTPATIENT)
Dept: FAMILY MEDICINE CLINIC | Age: 64
End: 2024-06-20

## 2024-06-20 NOTE — TELEPHONE ENCOUNTER
Patient accidentally took crestor in the AM and PM yesterday  He wanted to let you know and ask if there is anything to be concerned about or if there is anything he should do?    He did sort out the medication confusion to prevent it from happening again

## 2024-06-20 NOTE — TELEPHONE ENCOUNTER
No worries at all -- some patients take crestor 40 mg daily.  Usually patient's Skip tomorrow's dose. Good water intake.

## 2024-06-25 ENCOUNTER — OFFICE VISIT (OUTPATIENT)
Dept: PULMONOLOGY | Age: 64
End: 2024-06-25
Payer: MEDICARE

## 2024-06-25 VITALS
BODY MASS INDEX: 25.39 KG/M2 | DIASTOLIC BLOOD PRESSURE: 72 MMHG | SYSTOLIC BLOOD PRESSURE: 138 MMHG | WEIGHT: 161.8 LBS | HEIGHT: 67 IN | TEMPERATURE: 98 F | OXYGEN SATURATION: 96 % | HEART RATE: 62 BPM

## 2024-06-25 DIAGNOSIS — G47.34 NOCTURNAL HYPOXIA: Primary | ICD-10-CM

## 2024-06-25 DIAGNOSIS — R06.83 SNORING: ICD-10-CM

## 2024-06-25 DIAGNOSIS — J44.9 STAGE 1 MILD COPD BY GOLD CLASSIFICATION (HCC): ICD-10-CM

## 2024-06-25 DIAGNOSIS — G47.10 HYPERSOMNIA: ICD-10-CM

## 2024-06-25 PROCEDURE — 3078F DIAST BP <80 MM HG: CPT | Performed by: NURSE PRACTITIONER

## 2024-06-25 PROCEDURE — 99214 OFFICE O/P EST MOD 30 MIN: CPT | Performed by: NURSE PRACTITIONER

## 2024-06-25 PROCEDURE — 1123F ACP DISCUSS/DSCN MKR DOCD: CPT | Performed by: NURSE PRACTITIONER

## 2024-06-25 PROCEDURE — 3075F SYST BP GE 130 - 139MM HG: CPT | Performed by: NURSE PRACTITIONER

## 2024-06-25 ASSESSMENT — ENCOUNTER SYMPTOMS: SHORTNESS OF BREATH: 1

## 2024-06-25 NOTE — PROGRESS NOTES
He is recommended to side sleep and we discussed different positional aids that can be used.  Also sleeping with the head of the bed elevated can help with the snoring.  We discussed option such as a wedge pillow that can be purchased over-the-counter      I will call him with the results of the overnight pulse ox to ensure that the 2 L/min nasal cannula is in fact controlling his nocturnal hypoxia  Follow-up in the sleep clinic as needed  Keep pulmonary appointment in September    Billing based on time.  Total time on encounter today was 30 minutes  Electronically signed by FREDIS Zambrano CNP on 6/25/2024 at 10:46 AM

## 2024-07-05 ENCOUNTER — HOSPITAL ENCOUNTER (OUTPATIENT)
Dept: RESPIRATORY THERAPY | Age: 64
Discharge: HOME | End: 2024-07-05

## 2024-07-08 DIAGNOSIS — G47.34 NOCTURNAL HYPOXIA: ICD-10-CM

## 2024-07-09 NOTE — RESULT ENCOUNTER NOTE
Patient notified O2 levels were normal with 2 lpm and to continue the 2lpm. Patient voiced understanding,.

## 2024-07-18 RX ORDER — ASPIRIN 81 MG/1
81 TABLET, COATED ORAL DAILY
Qty: 90 TABLET | Refills: 2 | Status: SHIPPED | OUTPATIENT
Start: 2024-07-18

## 2024-09-16 RX ORDER — ASPIRIN 81 MG/1
81 TABLET, COATED ORAL DAILY
Qty: 90 TABLET | Refills: 2 | Status: SHIPPED | OUTPATIENT
Start: 2024-09-16

## 2024-10-14 ENCOUNTER — OFFICE VISIT (OUTPATIENT)
Dept: FAMILY MEDICINE CLINIC | Age: 64
End: 2024-10-14
Payer: MEDICARE

## 2024-10-14 VITALS
OXYGEN SATURATION: 95 % | HEART RATE: 86 BPM | DIASTOLIC BLOOD PRESSURE: 68 MMHG | RESPIRATION RATE: 16 BRPM | BODY MASS INDEX: 24.78 KG/M2 | SYSTOLIC BLOOD PRESSURE: 118 MMHG | WEIGHT: 158.2 LBS

## 2024-10-14 DIAGNOSIS — M79.89 CALF SWELLING: ICD-10-CM

## 2024-10-14 DIAGNOSIS — Z23 NEED FOR VACCINATION: ICD-10-CM

## 2024-10-14 DIAGNOSIS — M79.661 RIGHT CALF PAIN: ICD-10-CM

## 2024-10-14 DIAGNOSIS — I10 PRIMARY HYPERTENSION: ICD-10-CM

## 2024-10-14 DIAGNOSIS — F17.200 TOBACCO USE DISORDER: ICD-10-CM

## 2024-10-14 DIAGNOSIS — Z00.00 INITIAL MEDICARE ANNUAL WELLNESS VISIT: Primary | ICD-10-CM

## 2024-10-14 DIAGNOSIS — R05.3 POST-COVID CHRONIC COUGH: ICD-10-CM

## 2024-10-14 DIAGNOSIS — U09.9 POST-COVID CHRONIC COUGH: ICD-10-CM

## 2024-10-14 PROCEDURE — G0438 PPPS, INITIAL VISIT: HCPCS | Performed by: FAMILY MEDICINE

## 2024-10-14 PROCEDURE — 90677 PCV20 VACCINE IM: CPT | Performed by: FAMILY MEDICINE

## 2024-10-14 PROCEDURE — 1123F ACP DISCUSS/DSCN MKR DOCD: CPT | Performed by: FAMILY MEDICINE

## 2024-10-14 PROCEDURE — 3074F SYST BP LT 130 MM HG: CPT | Performed by: FAMILY MEDICINE

## 2024-10-14 PROCEDURE — G0009 ADMIN PNEUMOCOCCAL VACCINE: HCPCS | Performed by: FAMILY MEDICINE

## 2024-10-14 PROCEDURE — 3078F DIAST BP <80 MM HG: CPT | Performed by: FAMILY MEDICINE

## 2024-10-14 RX ORDER — AMLODIPINE BESYLATE 5 MG/1
5 TABLET ORAL DAILY
Qty: 90 TABLET | Refills: 3 | Status: SHIPPED | OUTPATIENT
Start: 2024-10-14

## 2024-10-14 RX ORDER — ALBUTEROL SULFATE 90 UG/1
2 INHALANT RESPIRATORY (INHALATION) 4 TIMES DAILY PRN
Qty: 8 G | Refills: 5 | Status: SHIPPED | OUTPATIENT
Start: 2024-10-14

## 2024-10-14 RX ORDER — LISINOPRIL 30 MG/1
30 TABLET ORAL DAILY
Qty: 90 TABLET | Refills: 3 | Status: SHIPPED | OUTPATIENT
Start: 2024-10-14 | End: 2025-10-09

## 2024-10-14 ASSESSMENT — PATIENT HEALTH QUESTIONNAIRE - PHQ9
SUM OF ALL RESPONSES TO PHQ QUESTIONS 1-9: 0
SUM OF ALL RESPONSES TO PHQ9 QUESTIONS 1 & 2: 0
2. FEELING DOWN, DEPRESSED OR HOPELESS: NOT AT ALL
SUM OF ALL RESPONSES TO PHQ QUESTIONS 1-9: 0
SUM OF ALL RESPONSES TO PHQ QUESTIONS 1-9: 0
1. LITTLE INTEREST OR PLEASURE IN DOING THINGS: NOT AT ALL
SUM OF ALL RESPONSES TO PHQ QUESTIONS 1-9: 0

## 2024-10-14 ASSESSMENT — LIFESTYLE VARIABLES
HOW OFTEN DO YOU HAVE A DRINK CONTAINING ALCOHOL: MONTHLY OR LESS
HOW MANY STANDARD DRINKS CONTAINING ALCOHOL DO YOU HAVE ON A TYPICAL DAY: 1 OR 2

## 2024-10-14 NOTE — PATIENT INSTRUCTIONS
chart across the room.  You read aloud from a small card that you hold in your hand.  Refraction   You look into a special device.  The device puts lenses of different strengths in front of each eye to see how strong your glasses or contact lenses need to be.  Visual field tests   Your doctor may have you look through special machines.  Or your doctor may simply have you stare straight ahead while they move a finger into and out of your field of vision.  Color vision test   You look at pieces of printed test patterns in various colors. You say what number or symbol you see.  Your doctor may have you trace the number or symbol using a pointer.  How do these tests feel?  There is very little chance of having a problem from this test. If dilating drops are used for a vision test, they may make the eyes sting and cause a medicine taste in the mouth.  Follow-up care is a key part of your treatment and safety. Be sure to make and go to all appointments, and call your doctor if you are having problems. It's also a good idea to know your test results and keep a list of the medicines you take.  Where can you learn more?  Go to https://www.[employer].net/patientEd and enter G551 to learn more about \"Learning About Vision Tests.\"  Current as of: June 5, 2023  Content Version: 14.2  © 2024 [employer].   Care instructions adapted under license by [employer]. If you have questions about a medical condition or this instruction, always ask your healthcare professional. [employer] disclaims any warranty or liability for your use of this information.           A Healthy Heart: Care Instructions  Overview     Coronary artery disease, also called heart disease, occurs when a substance called plaque builds up in the vessels that supply oxygen-rich blood to your heart muscle. This can narrow the blood vessels and reduce blood flow. A heart attack happens when blood flow is completely blocked. A high-fat diet,

## 2024-10-14 NOTE — PROGRESS NOTES
After obtaining consent, and per orders of Dr. Ireland, injection given by Ramona Tompkins MA. Patient instructed to report any adverse reaction to me immediately.    Immunizations Administered       Name Date Dose Route    Pneumococcal, PCV20, PREVNAR 20, (age 6w+), IM, 0.5mL 10/14/2024 0.5 mL Intramuscular    Site: Deltoid- Left    Lot: [id number]    NDC: 0005-2000-10            Patient tolerated well  VIS given  Vaccine Checklist filled out  
breath    Peanut Oil Diarrhea    Sulfa Antibiotics Hives     Prior to Visit Medications    Medication Sig Taking? Authorizing Provider   albuterol sulfate HFA (VENTOLIN HFA) 108 (90 Base) MCG/ACT inhaler Inhale 2 puffs into the lungs 4 times daily as needed for Wheezing Yes Marsha Ireland MD   amLODIPine (NORVASC) 5 MG tablet Take 1 tablet by mouth daily Yes Marsha Ireland MD   lisinopril (PRINIVIL;ZESTRIL) 30 MG tablet Take 1 tablet by mouth daily Yes Marsha Ireland MD   ASPIRIN LOW DOSE 81 MG EC tablet TAKE 1 TABLET BY MOUTH DAILY  Karlos Vanegas MD   Acetaminophen (TYLENOL 8 HOUR ARTHRITIS PAIN PO) Take by mouth  Provider, MD Antonia   pantoprazole (PROTONIX) 20 MG tablet Take 1 tablet by mouth daily  Marsha Ireland MD   clopidogrel (PLAVIX) 75 MG tablet Take 1 tablet by mouth daily Take 8 tablets on day #1, 12 hours after last dose then 1 tablet daily  Karlos Vanegas MD   rosuvastatin (CRESTOR) 20 MG tablet take 1 tablet by mouth once daily at bedtime  Karlos Vanegas MD   metoprolol tartrate (LOPRESSOR) 25 MG tablet Take 0.5 tablets by mouth 2 times daily  Katie Johnson APRN - CNP   nitroGLYCERIN (NITROSTAT) 0.4 MG SL tablet Place 1 tablet under the tongue every 5 minutes as needed for Chest pain up to max of 3 total doses. If no relief after 1 dose, call 911.  Katie Johnson APRN - CNP   umeclidinium-vilanterol (ANORO ELLIPTA) 62.5-25 MCG/ACT inhaler Inhale 1 puff into the lungs daily  Lali Madera MD   albuterol (PROVENTIL) (2.5 MG/3ML) 0.083% nebulizer solution Take 3 mLs by nebulization every 6 hours as needed for Wheezing or Shortness of Breath  Lali Madera MD   fluticasone (FLONASE) 50 MCG/ACT nasal spray 2 sprays by Each Nostril route daily  Lali Madera MD   Respiratory Therapy Supplies (NEBULIZER/TUBING/MOUTHPIECE) KIT 1 kit by Does not apply route in the morning, at noon, in the evening, and at bedtime  Danielle Andersen

## 2024-10-18 ENCOUNTER — HOSPITAL ENCOUNTER (OUTPATIENT)
Dept: INTERVENTIONAL RADIOLOGY/VASCULAR | Age: 64
Discharge: HOME | End: 2024-10-20
Attending: FAMILY MEDICINE
Payer: MEDICARE

## 2024-10-18 DIAGNOSIS — M79.89 CALF SWELLING: ICD-10-CM

## 2024-10-18 DIAGNOSIS — M79.661 RIGHT CALF PAIN: ICD-10-CM

## 2024-10-18 PROCEDURE — 93971 EXTREMITY STUDY: CPT

## 2024-10-18 NOTE — PROGRESS NOTES
Neck Circumference -   16 inches  Mallampati - 3    Lung Nodule Screening     [x] Qualifies    [] Does not qualify   [] Declined    [] Completed    
tight clothing or belts, avoiding eating late at night and not lying down shortly after mealtime and achieving weight loss are discussed. Avoid ASA, NSAID's, caffeine, peppermints, alcohol and tobacco.   -[place] advised to continue prescribed inhalers and keep good compliance.  -Patient educated to update his pneumococcal vaccine, Covid vaccine, Shingles vaccine, Respiratory Syncytial Virus vaccine with family physician and take influenza vaccine in coming season with out fail. Patient verbalizes understanding.  -He was advised to keep his scheduled follow-up appointment with Dr. Karlos Vanegas MD for further management of Coronary artery disease.  -Will schedule patient for low-dose CT scan of chest without IV contrast for lung cancer screening in April 2024.  -Schedule patient for spirometry with bronchodilator response in April 2024.  -He was offered a Pulmonary rehab consult for pulmonary rehab therapy for his COPD once cleared by his family physician. How ever at the end of discussion he refused and verbalizes understanding of consequences of her decision.  -Schedule patient for a follow with my clinic in April 2024 with the planned low-dose CT scan of chest without IV contrast and spirometry with bronchodilator response to be done at least 3 days before clinic visit.  He was advised to make an earlier appointment if he develops any worsening of symptoms or clinical condition.  -[place] educated about my impression and plan. He verbalizes understanding.       -I personally reviewed updated the Past medical hx, Past surgical hx,Social hx, Family hx, Medications, Allergies in the discrete data section of the patient chart along with labs, Pulmonary medicine,Sleep medicine related, Pathological, Microbiological and Radiological investigations.          Discussed with the patient the current USPSTF guidelines released March 9, 2021 for screening for lung cancer.    For adults aged 50 to 80

## 2024-10-21 ENCOUNTER — OFFICE VISIT (OUTPATIENT)
Dept: PULMONOLOGY | Age: 64
End: 2024-10-21
Payer: MEDICARE

## 2024-10-21 VITALS
TEMPERATURE: 97.7 F | DIASTOLIC BLOOD PRESSURE: 70 MMHG | OXYGEN SATURATION: 94 % | HEIGHT: 67 IN | HEART RATE: 65 BPM | WEIGHT: 161.2 LBS | SYSTOLIC BLOOD PRESSURE: 120 MMHG | BODY MASS INDEX: 25.3 KG/M2

## 2024-10-21 DIAGNOSIS — J44.9 STAGE 1 MILD COPD BY GOLD CLASSIFICATION (HCC): Primary | ICD-10-CM

## 2024-10-21 DIAGNOSIS — Z87.891 PERSONAL HISTORY OF TOBACCO USE: ICD-10-CM

## 2024-10-21 DIAGNOSIS — Z87.891 PERSONAL HISTORY OF TOBACCO USE, PRESENTING HAZARDS TO HEALTH: ICD-10-CM

## 2024-10-21 PROCEDURE — 99214 OFFICE O/P EST MOD 30 MIN: CPT | Performed by: INTERNAL MEDICINE

## 2024-10-21 PROCEDURE — 3078F DIAST BP <80 MM HG: CPT | Performed by: INTERNAL MEDICINE

## 2024-10-21 PROCEDURE — 1123F ACP DISCUSS/DSCN MKR DOCD: CPT | Performed by: INTERNAL MEDICINE

## 2024-10-21 PROCEDURE — G0296 VISIT TO DETERM LDCT ELIG: HCPCS | Performed by: INTERNAL MEDICINE

## 2024-10-21 PROCEDURE — 3074F SYST BP LT 130 MM HG: CPT | Performed by: INTERNAL MEDICINE

## 2024-10-21 NOTE — PATIENT INSTRUCTIONS
Recommendations/Plan:  -Continue Anora 1puff daily in am.  -Continue albuterol HFA 2 puffs every 6 hours as needed.  -He instructed to use albuterol inhaler 2 puffs Q6h prn or albuterol 2.5 mg nebs Q6h prn (the nebulizer) at one time as rescue medication not both at the same time. He verbalizes understanding.   -He was advised to stop taking Lisinopril due to her chronic cough ( May be side effect from ACE inhibitor i.e Lisinopril) after discussing with his family physiciant. His family physician need to start on alternate medication with out cough as side effect for optimal control of blood pressure.  However, due to improvement in his cough, he want to continue lisinopril for his blood pressure.  -He was advised to continue 2 L/min of oxygen during nighttime.  He do not need oxygen at rest or with exercise.  -He was advised to continue to practice antireflux measures  such as raising the head of the bed, avoiding tight clothing or belts, avoiding eating late at night and not lying down shortly after mealtime and achieving weight loss are discussed. Avoid ASA, NSAID's, caffeine, peppermints, alcohol and tobacco.   -[place] advised to continue prescribed inhalers and keep good compliance.  -Patient educated to update his pneumococcal vaccine, Covid vaccine, Shingles vaccine, Respiratory Syncytial Virus vaccine with family physician and take influenza vaccine in coming season with out fail. Patient verbalizes understanding.  -He was advised to keep his scheduled follow-up appointment with Dr. Karlos Vanegas MD for further management of Coronary artery disease.  -Will schedule patient for low-dose CT scan of chest without IV contrast for lung cancer screening in April 2024.  -Schedule patient for spirometry with bronchodilator response in April 2024.  -He was offered a Pulmonary rehab consult for pulmonary rehab therapy for his COPD once cleared by his family physician. How ever at the end of discussion he

## 2024-12-18 ENCOUNTER — OFFICE VISIT (OUTPATIENT)
Dept: CARDIOLOGY CLINIC | Age: 64
End: 2024-12-18
Payer: MEDICARE

## 2024-12-18 VITALS
HEART RATE: 83 BPM | BODY MASS INDEX: 26.21 KG/M2 | WEIGHT: 167 LBS | SYSTOLIC BLOOD PRESSURE: 138 MMHG | HEIGHT: 67 IN | DIASTOLIC BLOOD PRESSURE: 81 MMHG

## 2024-12-18 DIAGNOSIS — I10 PRIMARY HYPERTENSION: ICD-10-CM

## 2024-12-18 DIAGNOSIS — Z87.891 FORMER SMOKER: ICD-10-CM

## 2024-12-18 DIAGNOSIS — I25.10 CAD IN NATIVE ARTERY: Primary | ICD-10-CM

## 2024-12-18 PROCEDURE — 99214 OFFICE O/P EST MOD 30 MIN: CPT | Performed by: STUDENT IN AN ORGANIZED HEALTH CARE EDUCATION/TRAINING PROGRAM

## 2024-12-18 PROCEDURE — 1159F MED LIST DOCD IN RCRD: CPT | Performed by: STUDENT IN AN ORGANIZED HEALTH CARE EDUCATION/TRAINING PROGRAM

## 2024-12-18 PROCEDURE — 3079F DIAST BP 80-89 MM HG: CPT | Performed by: STUDENT IN AN ORGANIZED HEALTH CARE EDUCATION/TRAINING PROGRAM

## 2024-12-18 PROCEDURE — 1123F ACP DISCUSS/DSCN MKR DOCD: CPT | Performed by: STUDENT IN AN ORGANIZED HEALTH CARE EDUCATION/TRAINING PROGRAM

## 2024-12-18 PROCEDURE — 3075F SYST BP GE 130 - 139MM HG: CPT | Performed by: STUDENT IN AN ORGANIZED HEALTH CARE EDUCATION/TRAINING PROGRAM

## 2024-12-18 NOTE — PROGRESS NOTES
Follow up  EKG 6-19-24  C/o slight sob  
daily Take 8 tablets on day #1, 12 hours after last dose then 1 tablet daily 90 tablet 3    rosuvastatin (CRESTOR) 20 MG tablet take 1 tablet by mouth once daily at bedtime 90 tablet 3    metoprolol tartrate (LOPRESSOR) 25 MG tablet Take 0.5 tablets by mouth 2 times daily 60 tablet 3    nitroGLYCERIN (NITROSTAT) 0.4 MG SL tablet Place 1 tablet under the tongue every 5 minutes as needed for Chest pain up to max of 3 total doses. If no relief after 1 dose, call 911. 25 tablet 1    umeclidinium-vilanterol (ANORO ELLIPTA) 62.5-25 MCG/ACT inhaler Inhale 1 puff into the lungs daily 30 each 11    albuterol (PROVENTIL) (2.5 MG/3ML) 0.083% nebulizer solution Take 3 mLs by nebulization every 6 hours as needed for Wheezing or Shortness of Breath 120 each 11    Respiratory Therapy Supplies (NEBULIZER/TUBING/MOUTHPIECE) KIT 1 kit by Does not apply route in the morning, at noon, in the evening, and at bedtime 1 kit 0    fluticasone (FLONASE) 50 MCG/ACT nasal spray 2 sprays by Each Nostril route daily (Patient not taking: Reported on 12/18/2024) 1 each 11     No current facility-administered medications for this visit.     Allergies   Allergen Reactions    Brilinta [Ticagrelor] Other (See Comments)     Shortness of breath    Peanut Oil Diarrhea    Sulfa Antibiotics Hives     Health Maintenance   Topic Date Due    Shingles vaccine (1 of 2) Never done    Respiratory Syncytial Virus (RSV) Pregnant or age 60 yrs+ (1 - Risk 60-74 years 1-dose series) Never done    COVID-19 Vaccine (1 - 2023-24 season) Never done    A1C test (Diabetic or Prediabetic)  10/04/2024    Flu vaccine (1) 06/30/2025 (Originally 8/1/2024)    Lung Cancer Screening &/or Counseling  04/25/2025    Lipids  05/24/2025    Depression Screen  10/14/2025    Colorectal Cancer Screen  01/31/2029    DTaP/Tdap/Td vaccine (2 - Td or Tdap) 12/23/2033    Annual Wellness Visit (Medicare Advantage)  Completed    Pneumococcal 65+ years Vaccine  Completed    AAA screen  Completed

## 2025-01-16 DIAGNOSIS — K21.9 GASTROESOPHAGEAL REFLUX DISEASE WITHOUT ESOPHAGITIS: ICD-10-CM

## 2025-01-16 RX ORDER — METOPROLOL TARTRATE 25 MG/1
12.5 TABLET, FILM COATED ORAL 2 TIMES DAILY
Qty: 90 TABLET | Refills: 1 | Status: SHIPPED | OUTPATIENT
Start: 2025-01-16

## 2025-01-16 RX ORDER — PANTOPRAZOLE SODIUM 20 MG/1
20 TABLET, DELAYED RELEASE ORAL DAILY
Qty: 90 TABLET | Refills: 1 | Status: SHIPPED | OUTPATIENT
Start: 2025-01-16

## 2025-01-16 NOTE — TELEPHONE ENCOUNTER
[place] called requesting a refill on the following medications:  Requested Prescriptions     Pending Prescriptions Disp Refills    pantoprazole (PROTONIX) 20 MG tablet [Pharmacy Med Name: PANTOPRAZOLE 20MG TABLETS] 90 tablet 1     Sig: TAKE 1 TABLET BY MOUTH ONCE DAILY       Date of last visit: 10/14/2024  Date of next visit (if applicable):4/16/2025  Date of last refill: 6/18/24  Pharmacy Name: Emma De Anda MA

## 2025-02-11 ENCOUNTER — TELEPHONE (OUTPATIENT)
Dept: PULMONOLOGY | Age: 65
End: 2025-02-11

## 2025-02-11 NOTE — TELEPHONE ENCOUNTER
Patient called in stating that he has new insurance that is no longer covering Anoro so he would like you to send in an alternative. Please advise  -  [employer]  ID: [id number]  BIN: [id number]  PCN: [id number]  GROUP: [id number]  -  Preferred Pharmacy: [facility] [address]

## 2025-03-11 DIAGNOSIS — K21.9 GASTROESOPHAGEAL REFLUX DISEASE WITHOUT ESOPHAGITIS: ICD-10-CM

## 2025-03-11 RX ORDER — PANTOPRAZOLE SODIUM 20 MG/1
20 TABLET, DELAYED RELEASE ORAL DAILY
Qty: 90 TABLET | Refills: 1 | OUTPATIENT
Start: 2025-03-11

## 2025-03-12 NOTE — TELEPHONE ENCOUNTER
Patient called requesting refill  Advised 90-day supply with one refill was sent to [facility] 1/26/25    Left message at [facility] to fill script and call office if they needed anything further

## 2025-04-10 NOTE — PROGRESS NOTES
[place] for Pulmonary, Sleep and Critical Care Medicine  Pulmonary medicine clinic follow-up note.      Patient: South Nettles  : 1960      Chief complaint/[place]: [place] is a 64 y.o. old male came for follow-up regarding his mild COPD after having alpha-1 antitrypsin swabbing done at the last visit.    He underwent a baseline sleep study on 2024 and found to have no clinically significant obstructive sleep apnea.  He was diagnosed with a nocturnal hypoxia most likely due to COPD.  He is using 2 L/min of oxygen at nighttime.    Patient also underwent nocturnal pulse oximetry on 2 L/min of oxygen on nasal cannula at nighttime.    He follows with Dr. Karlos Vanegas MD from cardiology service regarding his coronary artery disease for which patient underwent 5 stents placement.      He was initially referred from Marsha Aaron MD.     He is currently using following inhalers:  Anoro 62.5/25 1 puff via inhalation daily  Albuterol HFA 2 puffs every 6 hourly as needed.    On today's questioning:  He denies cough or expectoration.  He denies hemoptysis.  He denies fever or chills.   He denies recent hospitalizations or emergency room visits.     He is using his prescribed inhalers with good compliance.   He is using rescue inhaler/nebs rarely.     He denies recent loss of weight or appetite changes.   He denies recent decline in functional status.      He was ever diagnosed with following pulmonary diseases:  Asthma:NO  Pulmonary fibrosis:NO  Sarcoidosis:NO  Pulmonary embolism: NO   History of DVT in the past: NO    Pulmonary hypertension:NO  Pleural effusion/s in the past:NO    He ever diagnosed with connective tissue diseases including Systemic lupus Erythematosus, Rheumatoid arthritis etc:NO.    His ever tested for PPD in the past: NO    He ever diagnosed with COVID-19 infection in the past: He was diagnosed with the COVID-19 infection 1 year back.  He was never hospitalized.  He

## 2025-04-16 ENCOUNTER — OFFICE VISIT (OUTPATIENT)
Dept: FAMILY MEDICINE CLINIC | Age: 65
End: 2025-04-16
Payer: COMMERCIAL

## 2025-04-16 VITALS
DIASTOLIC BLOOD PRESSURE: 74 MMHG | WEIGHT: 153.6 LBS | BODY MASS INDEX: 24.06 KG/M2 | HEART RATE: 83 BPM | OXYGEN SATURATION: 96 % | SYSTOLIC BLOOD PRESSURE: 126 MMHG

## 2025-04-16 DIAGNOSIS — Z12.5 SCREENING FOR PROSTATE CANCER: ICD-10-CM

## 2025-04-16 DIAGNOSIS — L85.3 DRY SKIN DERMATITIS: ICD-10-CM

## 2025-04-16 DIAGNOSIS — K21.9 GASTROESOPHAGEAL REFLUX DISEASE WITHOUT ESOPHAGITIS: ICD-10-CM

## 2025-04-16 DIAGNOSIS — I25.119 ATHEROSCLEROSIS OF NATIVE CORONARY ARTERY OF NATIVE HEART WITH ANGINA PECTORIS: ICD-10-CM

## 2025-04-16 DIAGNOSIS — I10 PRIMARY HYPERTENSION: Primary | ICD-10-CM

## 2025-04-16 DIAGNOSIS — R73.03 PREDIABETES: ICD-10-CM

## 2025-04-16 LAB — HBA1C MFR BLD: 6 %

## 2025-04-16 PROCEDURE — 83036 HEMOGLOBIN GLYCOSYLATED A1C: CPT | Performed by: FAMILY MEDICINE

## 2025-04-16 PROCEDURE — 99214 OFFICE O/P EST MOD 30 MIN: CPT | Performed by: FAMILY MEDICINE

## 2025-04-16 PROCEDURE — 3078F DIAST BP <80 MM HG: CPT | Performed by: FAMILY MEDICINE

## 2025-04-16 PROCEDURE — 3074F SYST BP LT 130 MM HG: CPT | Performed by: FAMILY MEDICINE

## 2025-04-16 PROCEDURE — 1123F ACP DISCUSS/DSCN MKR DOCD: CPT | Performed by: FAMILY MEDICINE

## 2025-04-16 PROCEDURE — 1159F MED LIST DOCD IN RCRD: CPT | Performed by: FAMILY MEDICINE

## 2025-04-16 PROCEDURE — 1160F RVW MEDS BY RX/DR IN RCRD: CPT | Performed by: FAMILY MEDICINE

## 2025-04-16 RX ORDER — PANTOPRAZOLE SODIUM 20 MG/1
20 TABLET, DELAYED RELEASE ORAL DAILY
Qty: 90 TABLET | Refills: 1 | Status: SHIPPED | OUTPATIENT
Start: 2025-04-16

## 2025-04-16 RX ORDER — MELOXICAM 15 MG/1
TABLET ORAL
COMMUNITY
Start: 2025-04-11

## 2025-04-16 SDOH — ECONOMIC STABILITY: FOOD INSECURITY: WITHIN THE PAST 12 MONTHS, THE FOOD YOU BOUGHT JUST DIDN'T LAST AND YOU DIDN'T HAVE MONEY TO GET MORE.: NEVER TRUE

## 2025-04-16 SDOH — ECONOMIC STABILITY: FOOD INSECURITY: WITHIN THE PAST 12 MONTHS, YOU WORRIED THAT YOUR FOOD WOULD RUN OUT BEFORE YOU GOT MONEY TO BUY MORE.: NEVER TRUE

## 2025-04-16 ASSESSMENT — PATIENT HEALTH QUESTIONNAIRE - PHQ9
SUM OF ALL RESPONSES TO PHQ QUESTIONS 1-9: 0
SUM OF ALL RESPONSES TO PHQ QUESTIONS 1-9: 0
1. LITTLE INTEREST OR PLEASURE IN DOING THINGS: NOT AT ALL
SUM OF ALL RESPONSES TO PHQ QUESTIONS 1-9: 0
2. FEELING DOWN, DEPRESSED OR HOPELESS: NOT AT ALL
SUM OF ALL RESPONSES TO PHQ QUESTIONS 1-9: 0

## 2025-04-16 ASSESSMENT — ENCOUNTER SYMPTOMS: SHORTNESS OF BREATH: 0

## 2025-04-16 NOTE — PATIENT INSTRUCTIONS
Try a cream like Cerave -- exfoliating cream 1-2 times a week if needed for flaking skin -- but daily apply an emollient/cream like Eucerin, Aquaphor or Aveeno.

## 2025-04-16 NOTE — PROGRESS NOTES
South Nettles (:  1960) is a 64 y.o. male,Established patient, here for evaluation of the following chief complaint(s):  6 Month Follow-Up (C/o of dry and flaky legs. Pt states no matter how much lotion he puts on, doesn't seem to combat dryness. Pt states legs are really itchy. Pt states its been 3 weeks today, since dry legs )    Assessment & Plan   ASSESSMENT/PLAN:  1. Primary hypertension  -     Comprehensive Metabolic Panel; Future  -     Lipid Panel; Future  -     TSH reflex to FT4; Future  -     Magnesium; Future  -     CBC with Auto Differential; Future  -     Vitamin B12 & Folate; Future  2. Prediabetes  -     POCT glycosylated hemoglobin (Hb A1C) [POC4]  3. Atherosclerosis of native coronary artery of native heart with angina pectoris  -     Comprehensive Metabolic Panel; Future  -     Lipid Panel; Future  -     TSH reflex to FT4; Future  -     Magnesium; Future  -     CBC with Auto Differential; Future  -     Vitamin B12 & Folate; Future  4. Gastroesophageal reflux disease without esophagitis  -     pantoprazole (PROTONIX) 20 MG tablet; Take 1 tablet by mouth daily, Disp-90 tablet, R-1Normal  5. Dry skin dermatitis  6. Screening for prostate cancer  -     PSA Screening; Future    Smoking cessation strongly advised  Continue current medications  Encouraged healthy diet with less sugar/processed foods  Cream options OTC discussed      Return in about 6 months (around 10/16/2025) for AWV.         Subjective   SUBJECTIVE/OBJECTIVE:  HPI  History of Present Illness  The patient is a 64-year-old male presenting here for a follow-up of medical conditions. The patient reports that he is doing overall okay. He continues to be working and he is motivated to do such at his age because he has five grandchildren at home. 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 The patient has had angioplasty for coronary artery disease, he also has hypertension and prediabetes. He is on guideline therapy and tolerating it

## 2025-04-29 ENCOUNTER — HOSPITAL ENCOUNTER (OUTPATIENT)
Dept: PULMONOLOGY | Age: 65
Discharge: HOME | End: 2025-04-29
Attending: INTERNAL MEDICINE
Payer: COMMERCIAL

## 2025-04-29 ENCOUNTER — HOSPITAL ENCOUNTER (OUTPATIENT)
Dept: CT IMAGING | Age: 65
Discharge: HOME | End: 2025-04-29
Attending: INTERNAL MEDICINE
Payer: COMMERCIAL

## 2025-04-29 DIAGNOSIS — Z87.891 PERSONAL HISTORY OF TOBACCO USE, PRESENTING HAZARDS TO HEALTH: ICD-10-CM

## 2025-04-29 DIAGNOSIS — J44.9 STAGE 1 MILD COPD BY GOLD CLASSIFICATION (HCC): ICD-10-CM

## 2025-04-29 DIAGNOSIS — Z87.891 PERSONAL HISTORY OF TOBACCO USE: ICD-10-CM

## 2025-04-29 PROCEDURE — 94060 EVALUATION OF WHEEZING: CPT

## 2025-04-29 PROCEDURE — 71271 CT THORAX LUNG CANCER SCR C-: CPT

## 2025-04-30 ENCOUNTER — OFFICE VISIT (OUTPATIENT)
Dept: CARDIOLOGY CLINIC | Age: 65
End: 2025-04-30
Payer: COMMERCIAL

## 2025-04-30 VITALS
SYSTOLIC BLOOD PRESSURE: 144 MMHG | WEIGHT: 155.2 LBS | BODY MASS INDEX: 25.86 KG/M2 | HEIGHT: 65 IN | HEART RATE: 71 BPM | DIASTOLIC BLOOD PRESSURE: 74 MMHG

## 2025-04-30 DIAGNOSIS — I20.9 ANGINA PECTORIS: Primary | ICD-10-CM

## 2025-04-30 PROCEDURE — 3078F DIAST BP <80 MM HG: CPT | Performed by: INTERNAL MEDICINE

## 2025-04-30 PROCEDURE — 99214 OFFICE O/P EST MOD 30 MIN: CPT | Performed by: INTERNAL MEDICINE

## 2025-04-30 PROCEDURE — 1159F MED LIST DOCD IN RCRD: CPT | Performed by: INTERNAL MEDICINE

## 2025-04-30 PROCEDURE — 1123F ACP DISCUSS/DSCN MKR DOCD: CPT | Performed by: INTERNAL MEDICINE

## 2025-04-30 PROCEDURE — 3077F SYST BP >= 140 MM HG: CPT | Performed by: INTERNAL MEDICINE

## 2025-04-30 RX ORDER — METOPROLOL TARTRATE 25 MG/1
25 TABLET, FILM COATED ORAL 2 TIMES DAILY
Qty: 90 TABLET | Refills: 1 | Status: SHIPPED | OUTPATIENT
Start: 2025-04-30

## 2025-04-30 NOTE — PROGRESS NOTES
1 year follow up.    Last EKG done on 06/19/2025.    Reports intermittent chest discomfort and tingling in his fingers for a couple of weeks. Also reports shortness of breath. Patient has a history of shortness of breath.     Denies palpitations, dizziness, and edema.

## 2025-04-30 NOTE — PROGRESS NOTES
[facility] [place] [facility]  [facility] CARDIOLOGY  [address]  [address]  [place] [address]  Dept: [phone]  Dept Fax: [phone]  Loc: [phone]    Visit Date: 4/30/2025  Mr. Nettles is a 65 y.o. male who presented for:  CAD  HPI:   [place] N[place]les is a pleasant 65 y.o. male who  has a past medical history of Bilateral inguinal hernia, Chronic back pain, COPD (chronic obstructive pulmonary disease) (HCC), Cough, Essential hypertension, GERD (gastroesophageal reflux disease), Osteoarthritis, Recurrent left inguinal hernia, and Stomach ulcer. His FH is positive for CAD. His father had CABG/PCI in his 60s. His mother had CABG/PCI in her 60s. His brother had PCI/CABG and an MI in his late 30s. His sister had CABG/PCI in her 40s. Echocardiogram 5/2024 revealed an EF of 60%. On 5/2024, patient underwent PCI/GUERDA of RCA and LAD. 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 Last time he had the pain was two weeks ago. Has occasional dyspnea on exertion.       Current Outpatient Medications:     meloxicam (MOBIC) 15 MG tablet, , Disp: , Rfl:     pantoprazole (PROTONIX) 20 MG tablet, Take 1 tablet by mouth daily, Disp: 90 tablet, Rfl: 1    tiotropium-olodaterol (STIOLTO) 2.5-2.5 MCG/ACT AERS, Inhale 2 puffs into the lungs daily (Patient not taking: Reported on 4/16/2025), Disp: 1 each, Rfl: 11    metoprolol tartrate (LOPRESSOR) 25 MG tablet, TAKE 1/2 TABLET BY MOUTH TWICE DAILY, Disp: 90 tablet, Rfl: 1    albuterol sulfate HFA (VENTOLIN HFA) 108 (90 Base) MCG/ACT inhaler, Inhale 2 puffs into the lungs 4 times daily as needed for Wheezing, Disp: 8 g, Rfl: 5    amLODIPine (NORVASC) 5 MG tablet, Take 1 tablet by mouth daily, Disp: 90 tablet, Rfl: 3    lisinopril (PRINIVIL;ZESTRIL) 30 MG tablet,

## 2025-05-06 ENCOUNTER — OFFICE VISIT (OUTPATIENT)
Dept: PULMONOLOGY | Age: 65
End: 2025-05-06
Payer: COMMERCIAL

## 2025-05-06 VITALS
DIASTOLIC BLOOD PRESSURE: 72 MMHG | HEART RATE: 60 BPM | TEMPERATURE: 97.8 F | BODY MASS INDEX: 25.99 KG/M2 | SYSTOLIC BLOOD PRESSURE: 118 MMHG | HEIGHT: 65 IN | OXYGEN SATURATION: 97 % | WEIGHT: 156 LBS

## 2025-05-06 DIAGNOSIS — G47.34 NOCTURNAL HYPOXIA: ICD-10-CM

## 2025-05-06 DIAGNOSIS — J44.9 STAGE 1 MILD COPD BY GOLD CLASSIFICATION (HCC): Primary | ICD-10-CM

## 2025-05-06 DIAGNOSIS — Z87.891 PERSONAL HISTORY OF TOBACCO USE, PRESENTING HAZARDS TO HEALTH: ICD-10-CM

## 2025-05-06 DIAGNOSIS — J44.9 CHRONIC OBSTRUCTIVE PULMONARY DISEASE, UNSPECIFIED COPD TYPE (HCC): ICD-10-CM

## 2025-05-06 DIAGNOSIS — Z87.891 PERSONAL HISTORY OF TOBACCO USE: ICD-10-CM

## 2025-05-06 PROCEDURE — 3078F DIAST BP <80 MM HG: CPT | Performed by: INTERNAL MEDICINE

## 2025-05-06 PROCEDURE — 99214 OFFICE O/P EST MOD 30 MIN: CPT | Performed by: INTERNAL MEDICINE

## 2025-05-06 PROCEDURE — 1123F ACP DISCUSS/DSCN MKR DOCD: CPT | Performed by: INTERNAL MEDICINE

## 2025-05-06 PROCEDURE — G0296 VISIT TO DETERM LDCT ELIG: HCPCS | Performed by: INTERNAL MEDICINE

## 2025-05-06 PROCEDURE — 3074F SYST BP LT 130 MM HG: CPT | Performed by: INTERNAL MEDICINE

## 2025-05-06 PROCEDURE — 1159F MED LIST DOCD IN RCRD: CPT | Performed by: INTERNAL MEDICINE

## 2025-05-06 NOTE — PROGRESS NOTES
Neck Circumference -   15.75 inches  Mallampati - 3    Lung Nodule Screening     [x] Qualifies    [] Does not qualify   [] Declined    [] Completed

## 2025-05-06 NOTE — PROGRESS NOTES
[place] for Pulmonary, Sleep and Critical Care Medicine  Pulmonary medicine clinic follow up note.        Patient: South Nettles  : 1960      Chief complaint/[place]: [place] is a 65 y.o.oldmale came for follow up regarding his mild COPD after having alpha-1 antitrypsin swabbing done previously.     Patient was initially referred to the pulmonology office by Marsha Aaron MD.     He underwent a baseline sleep study on 2024 and found to have no clinically significant obstructive sleep apnea. He was diagnosed with a nocturnal hypoxia most likely due to COPD. He is using 2 L/min of oxygen at nighttime. Since than he has remained on  2L nasal canula at night.  He had underwent nocturnal pulse ox monitoring which showed 2 L/min of oxygen on nasal cannula at nighttime.     Patient follows with cardiology, Dr. Kalros Lazaro for treatment and management of his coronary artery disease. In May 2024 patient underwent C with placement of 5 stents.  Patient has stents placed to his RCA as well as LAD.  Since placement of the stents his symptoms have overall improved.     Today patient states that his breathing is doing well.  He is recently getting over a sinus congestion/sinus cold has had cold and has a little bit of postnasal drip.  With this postnasal drip he does have some wheezing but states his symptoms have improving.  He denies any shortness of breath or difficulty breathing associated with this.  No changes in his cough, no change or new production in his cough.    Patient was previously on Anoro for treatment/maintenance of his COPD.  This was changed to Stiolto in the setting of his insurance was not covering it.  Patient states that even with Stiolto he has been unable to afford the medication.  Patient has stopped taking his inhalers at this time.  Since discontinuing his inhalers on his home patient has not noticed any changes overall in his breathing.  Today patient denies

## 2025-05-07 ENCOUNTER — HOSPITAL ENCOUNTER (OUTPATIENT)
Dept: NUCLEAR MEDICINE | Age: 65
Discharge: HOME | End: 2025-05-07
Attending: INTERNAL MEDICINE
Payer: COMMERCIAL

## 2025-05-07 ENCOUNTER — RESULTS FOLLOW-UP (OUTPATIENT)
Dept: CARDIOLOGY CLINIC | Age: 65
End: 2025-05-07

## 2025-05-07 ENCOUNTER — HOSPITAL ENCOUNTER (OUTPATIENT)
Age: 65
Discharge: HOME | End: 2025-05-09
Attending: INTERNAL MEDICINE
Payer: COMMERCIAL

## 2025-05-07 ENCOUNTER — TELEPHONE (OUTPATIENT)
Dept: CARDIOLOGY CLINIC | Age: 65
End: 2025-05-07

## 2025-05-07 VITALS — WEIGHT: 155 LBS | BODY MASS INDEX: 25.83 KG/M2 | HEIGHT: 65 IN

## 2025-05-07 DIAGNOSIS — R94.39 ABNORMAL STRESS TEST: Primary | ICD-10-CM

## 2025-05-07 DIAGNOSIS — I25.10 CAD IN NATIVE ARTERY: ICD-10-CM

## 2025-05-07 DIAGNOSIS — R94.31 ABNORMAL EKG: ICD-10-CM

## 2025-05-07 DIAGNOSIS — I20.9 ANGINA PECTORIS: ICD-10-CM

## 2025-05-07 LAB
ECHO BSA: 1.8 M2
NUC STRESS EJECTION FRACTION: 65 %
STRESS BASELINE DIAS BP: 73 MMHG
STRESS BASELINE HR: 60 BPM
STRESS BASELINE SYS BP: 132 MMHG
STRESS ESTIMATED WORKLOAD: 1 METS
STRESS PEAK DIAS BP: 85 MMHG
STRESS PEAK SYS BP: 151 MMHG
STRESS PERCENT HR ACHIEVED: 63 %
STRESS POST PEAK HR: 96 BPM
STRESS RATE PRESSURE PRODUCT: NORMAL BPM*MMHG
STRESS STAGE 1 BP: NORMAL MMHG
STRESS STAGE 1 DURATION: 1 MIN:SEC
STRESS STAGE 1 HR: 84 BPM
STRESS STAGE 2 BP: NORMAL MMHG
STRESS STAGE 2 DURATION: 1 MIN:SEC
STRESS STAGE 2 HR: 94 BPM
STRESS STAGE 3 BP: NORMAL MMHG
STRESS STAGE 3 DURATION: 1 MIN:SEC
STRESS STAGE 3 HR: 92 BPM
STRESS STAGE RECOVERY 1 BP: NORMAL MMHG
STRESS STAGE RECOVERY 1 DURATION: 1 MIN:SEC
STRESS STAGE RECOVERY 1 HR: 85 BPM
STRESS STAGE RECOVERY 2 BP: NORMAL MMHG
STRESS STAGE RECOVERY 2 DURATION: 1 MIN:SEC
STRESS STAGE RECOVERY 2 HR: 87 BPM
STRESS STAGE RECOVERY 3 BP: NORMAL MMHG
STRESS STAGE RECOVERY 3 DURATION: 1 MIN:SEC
STRESS STAGE RECOVERY 3 HR: 84 BPM
STRESS STAGE RECOVERY 4 BP: NORMAL MMHG
STRESS STAGE RECOVERY 4 DURATION: 2 MIN:SEC
STRESS STAGE RECOVERY 4 HR: 81 BPM
STRESS TARGET HR: 153 BPM
TID: 1.13

## 2025-05-07 PROCEDURE — 78452 HT MUSCLE IMAGE SPECT MULT: CPT | Performed by: INTERNAL MEDICINE

## 2025-05-07 PROCEDURE — 3430000000 HC RX DIAGNOSTIC RADIOPHARMACEUTICAL: Performed by: INTERNAL MEDICINE

## 2025-05-07 PROCEDURE — 78452 HT MUSCLE IMAGE SPECT MULT: CPT

## 2025-05-07 PROCEDURE — 6360000002 HC RX W HCPCS: Performed by: INTERNAL MEDICINE

## 2025-05-07 PROCEDURE — 93018 CV STRESS TEST I&R ONLY: CPT | Performed by: INTERNAL MEDICINE

## 2025-05-07 PROCEDURE — A9500 TC99M SESTAMIBI: HCPCS | Performed by: INTERNAL MEDICINE

## 2025-05-07 PROCEDURE — 93017 CV STRESS TEST TRACING ONLY: CPT

## 2025-05-07 PROCEDURE — 93016 CV STRESS TEST SUPVJ ONLY: CPT | Performed by: INTERNAL MEDICINE

## 2025-05-07 RX ORDER — REGADENOSON 0.08 MG/ML
0.4 INJECTION, SOLUTION INTRAVENOUS
Status: COMPLETED | OUTPATIENT
Start: 2025-05-07 | End: 2025-05-07

## 2025-05-07 RX ORDER — TETRAKIS(2-METHOXYISOBUTYLISOCYANIDE)COPPER(I) TETRAFLUOROBORATE 1 MG/ML
10 INJECTION, POWDER, LYOPHILIZED, FOR SOLUTION INTRAVENOUS
Status: COMPLETED | OUTPATIENT
Start: 2025-05-07 | End: 2025-05-07

## 2025-05-07 RX ORDER — TETRAKIS(2-METHOXYISOBUTYLISOCYANIDE)COPPER(I) TETRAFLUOROBORATE 1 MG/ML
30.4 INJECTION, POWDER, LYOPHILIZED, FOR SOLUTION INTRAVENOUS
Status: COMPLETED | OUTPATIENT
Start: 2025-05-07 | End: 2025-05-07

## 2025-05-07 RX ADMIN — Medication 30.4 MILLICURIE: at 11:56

## 2025-05-07 RX ADMIN — Medication 10 MILLICURIE: at 10:55

## 2025-05-07 RX ADMIN — REGADENOSON 0.4 MG: 0.08 INJECTION, SOLUTION INTRAVENOUS at 11:54

## 2025-05-07 NOTE — TELEPHONE ENCOUNTER
Result note for stress test    Karlos Vanegas MD  P [facility] Clinical Staff  ABNORMAL STUDY  Inform patient  Schedule C on my IC week

## 2025-05-08 ENCOUNTER — TELEPHONE (OUTPATIENT)
Dept: CARDIOLOGY CLINIC | Age: 65
End: 2025-05-08

## 2025-05-08 ENCOUNTER — PREP FOR PROCEDURE (OUTPATIENT)
Dept: CARDIOLOGY | Age: 65
End: 2025-05-08

## 2025-05-08 PROBLEM — R94.39 ABNORMAL STRESS TEST: Status: ACTIVE | Noted: 2025-05-07

## 2025-05-08 PROBLEM — M25.552 PAIN IN LEFT HIP: Status: ACTIVE | Noted: 2025-05-08

## 2025-05-08 RX ORDER — SODIUM CHLORIDE 9 MG/ML
INJECTION, SOLUTION INTRAVENOUS CONTINUOUS
Status: CANCELLED | OUTPATIENT
Start: 2025-05-08

## 2025-05-08 RX ORDER — NITROGLYCERIN 0.4 MG/1
0.4 TABLET SUBLINGUAL EVERY 5 MIN PRN
Status: CANCELLED | OUTPATIENT
Start: 2025-05-08

## 2025-05-08 RX ORDER — ASPIRIN 325 MG
325 TABLET ORAL ONCE
Status: CANCELLED | OUTPATIENT
Start: 2025-05-08 | End: 2025-05-08

## 2025-05-08 RX ORDER — SODIUM CHLORIDE 0.9 % (FLUSH) 0.9 %
5-40 SYRINGE (ML) INJECTION EVERY 12 HOURS SCHEDULED
Status: CANCELLED | OUTPATIENT
Start: 2025-05-08

## 2025-05-08 RX ORDER — SODIUM CHLORIDE 0.9 % (FLUSH) 0.9 %
5-40 SYRINGE (ML) INJECTION PRN
Status: CANCELLED | OUTPATIENT
Start: 2025-05-08

## 2025-05-08 RX ORDER — SODIUM CHLORIDE 9 MG/ML
INJECTION, SOLUTION INTRAVENOUS PRN
Status: CANCELLED | OUTPATIENT
Start: 2025-05-08

## 2025-05-08 NOTE — TELEPHONE ENCOUNTER
PROCEDURE: [id number]    DATE OF SERVICE: 05/09/2025    SERVICE LOCATION: [facility]    CPT CODE: 98316    PHYSICIAN: DR ORNELAS     DATE PRIOR AUTH SUBMITTED: 05/08/2025    STATUS: APPROVED.    CASE NUMBER: [id number]    AUTH NUMBER: [id number]    VALID:  05/09/2025-06/10/2025

## 2025-05-08 NOTE — TELEPHONE ENCOUNTER
Spoke with patient to schedule left heart cath for 5/09/25, arrival time of 7:00 am. Patient may take all meds as usual on day of procedure. All instructions reviewed extensively with patient via phone, patient verbalized understanding. Case scheduled with Jaclyn in cath lab.

## 2025-05-09 ENCOUNTER — HOSPITAL ENCOUNTER (OUTPATIENT)
Age: 65
Setting detail: OUTPATIENT SURGERY
Discharge: HOME | End: 2025-05-09
Attending: INTERNAL MEDICINE | Admitting: INTERNAL MEDICINE
Payer: COMMERCIAL

## 2025-05-09 VITALS
HEIGHT: 65 IN | WEIGHT: 151 LBS | DIASTOLIC BLOOD PRESSURE: 63 MMHG | HEART RATE: 72 BPM | OXYGEN SATURATION: 95 % | TEMPERATURE: 98.3 F | BODY MASS INDEX: 25.16 KG/M2 | RESPIRATION RATE: 19 BRPM | SYSTOLIC BLOOD PRESSURE: 106 MMHG

## 2025-05-09 DIAGNOSIS — R94.39 ABNORMAL STRESS TEST: ICD-10-CM

## 2025-05-09 LAB
ABO GROUP BLD: NORMAL
ANION GAP SERPL CALC-SCNC: 12 MEQ/L (ref 8–16)
APTT PPP: 31.7 SECONDS (ref 22–38)
BUN SERPL-MCNC: 17 MG/DL (ref 8–23)
CALCIUM SERPL-MCNC: 9.7 MG/DL (ref 8.8–10.2)
CHLORIDE SERPL-SCNC: 94 MEQ/L (ref 98–111)
CHOLEST SERPL-MCNC: 97 MG/DL (ref 100–199)
CO2 SERPL-SCNC: 22 MEQ/L (ref 22–29)
CREAT SERPL-MCNC: 0.9 MG/DL (ref 0.7–1.2)
DEPRECATED RDW RBC AUTO: 45.8 FL (ref 35–45)
ECHO BSA: 1.77 M2
EKG ATRIAL RATE: 66 BPM
EKG P AXIS: 60 DEGREES
EKG P-R INTERVAL: 234 MS
EKG Q-T INTERVAL: 382 MS
EKG QRS DURATION: 98 MS
EKG QTC CALCULATION (BAZETT): 400 MS
EKG R AXIS: 74 DEGREES
EKG T AXIS: 56 DEGREES
EKG VENTRICULAR RATE: 66 BPM
ERYTHROCYTE [DISTWIDTH] IN BLOOD BY AUTOMATED COUNT: 14.4 % (ref 11.5–14.5)
GFR SERPL CREATININE-BSD FRML MDRD: > 90 ML/MIN/1.73M2
GLUCOSE SERPL-MCNC: 102 MG/DL (ref 74–109)
HCT VFR BLD AUTO: 41.8 % (ref 42–52)
HDLC SERPL-MCNC: 37 MG/DL
HGB BLD-MCNC: 13.8 GM/DL (ref 14–18)
IAT IGG-SP REAG SERPL QL: NORMAL
INR PPP: 1.02 (ref 0.85–1.13)
LDLC SERPL CALC-MCNC: 46 MG/DL
MCH RBC QN AUTO: 28.6 PG (ref 26–33)
MCHC RBC AUTO-ENTMCNC: 33 GM/DL (ref 32.2–35.5)
MCV RBC AUTO: 86.5 FL (ref 80–94)
PLATELET # BLD AUTO: 310 THOU/MM3 (ref 130–400)
PMV BLD AUTO: 9 FL (ref 9.4–12.4)
POTASSIUM SERPL-SCNC: 5 MEQ/L (ref 3.5–5.2)
RBC # BLD AUTO: 4.83 MILL/MM3 (ref 4.7–6.1)
RH BLD: NORMAL
SODIUM SERPL-SCNC: 128 MEQ/L (ref 135–145)
TRIGL SERPL-MCNC: 68 MG/DL (ref 0–199)
WBC # BLD AUTO: 6.3 THOU/MM3 (ref 4.8–10.8)

## 2025-05-09 PROCEDURE — 36415 COLL VENOUS BLD VENIPUNCTURE: CPT

## 2025-05-09 PROCEDURE — 85610 PROTHROMBIN TIME: CPT

## 2025-05-09 PROCEDURE — 86901 BLOOD TYPING SEROLOGIC RH(D): CPT

## 2025-05-09 PROCEDURE — 93010 ELECTROCARDIOGRAM REPORT: CPT | Performed by: INTERNAL MEDICINE

## 2025-05-09 PROCEDURE — C1894 INTRO/SHEATH, NON-LASER: HCPCS | Performed by: INTERNAL MEDICINE

## 2025-05-09 PROCEDURE — 86900 BLOOD TYPING SEROLOGIC ABO: CPT

## 2025-05-09 PROCEDURE — 7100000010 HC PHASE II RECOVERY - FIRST 15 MIN: Performed by: INTERNAL MEDICINE

## 2025-05-09 PROCEDURE — 86885 COOMBS TEST INDIRECT QUAL: CPT

## 2025-05-09 PROCEDURE — 80048 BASIC METABOLIC PNL TOTAL CA: CPT

## 2025-05-09 PROCEDURE — 2500000003 HC RX 250 WO HCPCS: Performed by: INTERNAL MEDICINE

## 2025-05-09 PROCEDURE — 2580000003 HC RX 258: Performed by: PHYSICIAN ASSISTANT

## 2025-05-09 PROCEDURE — 7100000011 HC PHASE II RECOVERY - ADDTL 15 MIN: Performed by: INTERNAL MEDICINE

## 2025-05-09 PROCEDURE — 85730 THROMBOPLASTIN TIME PARTIAL: CPT

## 2025-05-09 PROCEDURE — 2709999900 HC NON-CHARGEABLE SUPPLY: Performed by: INTERNAL MEDICINE

## 2025-05-09 PROCEDURE — 93458 L HRT ARTERY/VENTRICLE ANGIO: CPT | Performed by: INTERNAL MEDICINE

## 2025-05-09 PROCEDURE — 99152 MOD SED SAME PHYS/QHP 5/>YRS: CPT | Performed by: INTERNAL MEDICINE

## 2025-05-09 PROCEDURE — 93005 ELECTROCARDIOGRAM TRACING: CPT | Performed by: PHYSICIAN ASSISTANT

## 2025-05-09 PROCEDURE — 85027 COMPLETE CBC AUTOMATED: CPT

## 2025-05-09 PROCEDURE — 6360000004 HC RX CONTRAST MEDICATION: Performed by: INTERNAL MEDICINE

## 2025-05-09 PROCEDURE — C1769 GUIDE WIRE: HCPCS | Performed by: INTERNAL MEDICINE

## 2025-05-09 PROCEDURE — 80061 LIPID PANEL: CPT

## 2025-05-09 PROCEDURE — 6360000002 HC RX W HCPCS: Performed by: INTERNAL MEDICINE

## 2025-05-09 RX ORDER — NITROGLYCERIN 0.4 MG/1
0.4 TABLET SUBLINGUAL EVERY 5 MIN PRN
Status: DISCONTINUED | OUTPATIENT
Start: 2025-05-09 | End: 2025-05-09 | Stop reason: HOSPADM

## 2025-05-09 RX ORDER — PHENYLEPHRINE HCL IN 0.9% NACL 1 MG/10 ML
VIAL (ML) INTRAVENOUS PRN
Status: DISCONTINUED | OUTPATIENT
Start: 2025-05-09 | End: 2025-05-09 | Stop reason: HOSPADM

## 2025-05-09 RX ORDER — SODIUM CHLORIDE 9 MG/ML
INJECTION, SOLUTION INTRAVENOUS CONTINUOUS
Status: DISCONTINUED | OUTPATIENT
Start: 2025-05-09 | End: 2025-05-09 | Stop reason: HOSPADM

## 2025-05-09 RX ORDER — SODIUM CHLORIDE 0.9 % (FLUSH) 0.9 %
5-40 SYRINGE (ML) INJECTION PRN
Status: DISCONTINUED | OUTPATIENT
Start: 2025-05-09 | End: 2025-05-09 | Stop reason: HOSPADM

## 2025-05-09 RX ORDER — ASPIRIN 325 MG
325 TABLET ORAL ONCE
Status: DISCONTINUED | OUTPATIENT
Start: 2025-05-09 | End: 2025-05-09 | Stop reason: HOSPADM

## 2025-05-09 RX ORDER — IOPAMIDOL 755 MG/ML
INJECTION, SOLUTION INTRAVASCULAR PRN
Status: DISCONTINUED | OUTPATIENT
Start: 2025-05-09 | End: 2025-05-09 | Stop reason: HOSPADM

## 2025-05-09 RX ORDER — ATROPINE SULFATE 0.4 MG/ML
0.5 INJECTION, SOLUTION INTRAVENOUS
Status: DISCONTINUED | OUTPATIENT
Start: 2025-05-09 | End: 2025-05-09 | Stop reason: HOSPADM

## 2025-05-09 RX ORDER — SODIUM CHLORIDE 9 MG/ML
INJECTION, SOLUTION INTRAVENOUS PRN
Status: DISCONTINUED | OUTPATIENT
Start: 2025-05-09 | End: 2025-05-09 | Stop reason: HOSPADM

## 2025-05-09 RX ORDER — SODIUM CHLORIDE 0.9 % (FLUSH) 0.9 %
5-40 SYRINGE (ML) INJECTION EVERY 12 HOURS SCHEDULED
Status: DISCONTINUED | OUTPATIENT
Start: 2025-05-09 | End: 2025-05-09 | Stop reason: HOSPADM

## 2025-05-09 RX ORDER — METOPROLOL TARTRATE 50 MG
50 TABLET ORAL 2 TIMES DAILY
Qty: 60 TABLET | Refills: 4 | Status: SHIPPED | OUTPATIENT
Start: 2025-05-09

## 2025-05-09 RX ORDER — LIDOCAINE HYDROCHLORIDE 20 MG/ML
INJECTION, SOLUTION EPIDURAL; INFILTRATION; INTRACAUDAL; PERINEURAL PRN
Status: DISCONTINUED | OUTPATIENT
Start: 2025-05-09 | End: 2025-05-09 | Stop reason: HOSPADM

## 2025-05-09 RX ORDER — FENTANYL CITRATE 50 UG/ML
INJECTION, SOLUTION INTRAMUSCULAR; INTRAVENOUS PRN
Status: DISCONTINUED | OUTPATIENT
Start: 2025-05-09 | End: 2025-05-09 | Stop reason: HOSPADM

## 2025-05-09 RX ORDER — ACETAMINOPHEN 325 MG/1
650 TABLET ORAL EVERY 4 HOURS PRN
Status: DISCONTINUED | OUTPATIENT
Start: 2025-05-09 | End: 2025-05-09 | Stop reason: HOSPADM

## 2025-05-09 RX ORDER — MIDAZOLAM HYDROCHLORIDE 1 MG/ML
INJECTION, SOLUTION INTRAMUSCULAR; INTRAVENOUS PRN
Status: DISCONTINUED | OUTPATIENT
Start: 2025-05-09 | End: 2025-05-09 | Stop reason: HOSPADM

## 2025-05-09 RX ADMIN — SODIUM CHLORIDE: 0.9 INJECTION, SOLUTION INTRAVENOUS at 07:43

## 2025-05-09 ASSESSMENT — PAIN SCALES - GENERAL: PAINLEVEL_OUTOF10: 0

## 2025-05-09 NOTE — FLOWSHEET NOTE
05/09/25 1212   AVS Reviewed   AVS & discharge instructions reviewed with patient and/or representative? Yes   Reviewed instructions with Patient;Other (name and relationship in comment)  (wife, Jolly)   Level of Understanding Questions answered;Teach back completed;Verbalized understanding;Return demonstration       Pt has no further questions at this time.  Belongings gathered.  Pt leaving for home with wife.

## 2025-05-09 NOTE — H&P
[facility]  Sedation/Analgesia History & Physical    Pt Name: [place] Nettles  Account number: [id number]  MRN: [id number]  YOB: 1960  Provider Performing Procedure: Karlos Vanegas MD MD  Referring Provider: Karlos Vanegas MD   Primary Care Physician: Marsha Ireland MD  Date: 5/9/2025    PRE-PROCEDURE    Code Status: FULL CODE  Brief History/Pre-Procedure Diagnosis:   Angina  Abnormal stress test   H/o CAD, PCI, Tobacco abuse  Consent: : I have discussed with the patient risks, benefits, and alternatives (and relevant risks, benefits, and side effects related to alternatives or not receiving care), and likelihood of the success.   The patient and/or representative appear to understand and agree to proceed.  The discussion encompasses risks, benefits, and side effects related to the alternatives and the risks related to not receiving the proposed care, treatment, and services.     The indication, risks and benefits of the procedure and possible therapeutic consequences and alternatives were discussed with the patient. The patient was given the opportunity to ask questions and to have them answered to his/her satisfaction. Risks of the procedure include but are not limited to the following: Bleeding, hematoma including retroperitoneal hemmorhage, infection, pain and discomfort, injury to the aorta and other blood vessels, rhythm disturbance, low blood pressure, myocardial infarction, stroke, kidney damage/failure, myocardial perforation, allergic reactions to sedatives/contrast material, loss of pulse/vascular compromise requiring surgery, aneurysm/pseudoaneurysm formation, possible loss of a limb/hand/leg, needing blood transfusion, requiring emergent open heart surgery or emergent coronary intervention, the need for intubation/respiratory support, the requirement for defibrillation/cardioversion, and death. Alternatives to and omission of the suggested procedure were discussed.  relief after 1 dose, call 911. 5/25/24  Yes Katie Johnson, APRN - CNP   albuterol (PROVENTIL) (2.5 MG/3ML) 0.083% nebulizer solution Take 3 mLs by nebulization every 6 hours as needed for Wheezing or Shortness of Breath 5/7/24 5/9/25 Yes Lali Madera MD   Respiratory Therapy Supplies (NEBULIZER/TUBING/MOUTHPIECE) KIT 1 kit by Does not apply route in the morning, at noon, in the evening, and at bedtime 3/27/24  Yes Danielle Andersen APRN - CNP     Additional information:       VITAL SIGNS   Vitals:    05/09/25 0725   BP: 122/79   Pulse: 73   Resp: 17   Temp:    SpO2:        PHYSICAL:   General: No acute distress  HEENT:  Unremarkable for age  Neck: without increased JVD, carotid pulses 2+ bilaterally without bruits  Heart: RRR, S1 & S2 WNL. No murmurs    Lungs: Clear to auscultation    Abdomen: BS present, without HSM, masses, or tenderness    Extremities: without C,C,E.  Pulses 2+ bilaterally   Mental Status: Alert & Oriented        PLANNED PROCEDURE   [x]Cath  [x]PCI                []Pacemaker/AICD  []TRINITY             []Cardioversion []Peripheral angiography/PTA  []Other:      SEDATION  Planned agent:[x]Midazolam []Meperidine []Sublimaze []Morphine  []Diazepam  []Other:     ASA Classification:  []1 [x]2 []3 []4 []5   Class 1: A normal healthy patient  Class 2: Pt with mild to moderate systemic disease  Class 3: Severe systemic disease or disturbance  Class 4: Severe systemic disorders that are already life threatening.  Class 5: Moribund pt with little chances of survival, for more than 24 hours.  Mallampati I Airway Classification:   []1 [x]2 []3 []4     [x]Pre-procedure diagnostic studies complete and results available.   Comment:    [x]Previous sedation/anesthesia experiences assessed.   Comment:    [x]The patient is an appropriate candidate to undergo the planned procedure sedation and anesthesia. (Refer to nursing sedation/analgesia documentation record)  [x]Formulation and discussion of

## 2025-05-09 NOTE — PROGRESS NOTES
0700 Patient admitted to 2E14  ambulatory for Heart Cath.  Patient NPO. Patient accompanied by wife, Jolly.  Vital signs obtained.   Assessment and data collection intiated.   Oriented to room.  Policies and procedures for 2E explained.   All questions answered with no further questions at this time.   Fall prevention and safety precautions discussed with patient.

## 2025-05-09 NOTE — PROGRESS NOTES
0950 Care taken over from cath lab. Radial site stable, no bleeding seen, site soft.  Armboard continued with vascband. Patient instructed not to bend wrist, not to put pressure on wrist and not to lift  or twist with wrist. Patient voices understanding.

## 2025-05-19 RX ORDER — CLOPIDOGREL BISULFATE 75 MG/1
75 TABLET ORAL DAILY
Qty: 90 TABLET | Refills: 0 | Status: SHIPPED | OUTPATIENT
Start: 2025-05-19

## 2025-05-22 ENCOUNTER — OFFICE VISIT (OUTPATIENT)
Dept: FAMILY MEDICINE CLINIC | Age: 65
End: 2025-05-22
Payer: COMMERCIAL

## 2025-05-22 VITALS
RESPIRATION RATE: 16 BRPM | BODY MASS INDEX: 25.79 KG/M2 | HEART RATE: 82 BPM | WEIGHT: 155 LBS | SYSTOLIC BLOOD PRESSURE: 126 MMHG | DIASTOLIC BLOOD PRESSURE: 62 MMHG

## 2025-05-22 DIAGNOSIS — L30.9 DERMATITIS: Primary | ICD-10-CM

## 2025-05-22 PROCEDURE — 1123F ACP DISCUSS/DSCN MKR DOCD: CPT | Performed by: NURSE PRACTITIONER

## 2025-05-22 PROCEDURE — 99213 OFFICE O/P EST LOW 20 MIN: CPT | Performed by: NURSE PRACTITIONER

## 2025-05-22 PROCEDURE — 1159F MED LIST DOCD IN RCRD: CPT | Performed by: NURSE PRACTITIONER

## 2025-05-22 PROCEDURE — 3074F SYST BP LT 130 MM HG: CPT | Performed by: NURSE PRACTITIONER

## 2025-05-22 PROCEDURE — 3078F DIAST BP <80 MM HG: CPT | Performed by: NURSE PRACTITIONER

## 2025-05-22 RX ORDER — CLOPIDOGREL BISULFATE 75 MG/1
TABLET ORAL
Qty: 90 TABLET | Refills: 0 | OUTPATIENT
Start: 2025-05-22

## 2025-05-22 ASSESSMENT — ENCOUNTER SYMPTOMS: VOMITING: 0

## 2025-05-22 NOTE — PROGRESS NOTES
heart sounds. No murmur heard.  Pulmonary:      Effort: Pulmonary effort is normal. No tachypnea.   Musculoskeletal:      Cervical back: Full passive range of motion without pain and normal range of motion.   Skin:     General: Skin is dry.      Coloration: Skin is not jaundiced or pale.      Findings: Rash present.          Neurological:      General: No focal deficit present.      Mental Status: He is alert. Mental status is at baseline.   Psychiatric:         Mood and Affect: Mood and affect normal.         Behavior: Behavior is cooperative.                  An electronic signature was used to authenticate this note.    --AYESHA LOYOLA, APRN - CNP

## 2025-06-02 RX ORDER — ROSUVASTATIN CALCIUM 20 MG/1
20 TABLET, COATED ORAL NIGHTLY
Qty: 90 TABLET | Refills: 3 | Status: SHIPPED | OUTPATIENT
Start: 2025-06-02

## 2025-06-06 ENCOUNTER — HOSPITAL ENCOUNTER (EMERGENCY)
Age: 65
Discharge: HOME | End: 2025-06-06
Payer: COMMERCIAL

## 2025-06-06 ENCOUNTER — APPOINTMENT (OUTPATIENT)
Dept: GENERAL RADIOLOGY | Age: 65
End: 2025-06-06
Payer: COMMERCIAL

## 2025-06-06 VITALS
WEIGHT: 155 LBS | BODY MASS INDEX: 25.83 KG/M2 | HEART RATE: 67 BPM | HEIGHT: 65 IN | DIASTOLIC BLOOD PRESSURE: 88 MMHG | TEMPERATURE: 97.3 F | OXYGEN SATURATION: 95 % | RESPIRATION RATE: 16 BRPM | SYSTOLIC BLOOD PRESSURE: 162 MMHG

## 2025-06-06 DIAGNOSIS — J40 SINOBRONCHITIS: Primary | ICD-10-CM

## 2025-06-06 DIAGNOSIS — J32.9 SINOBRONCHITIS: Primary | ICD-10-CM

## 2025-06-06 PROCEDURE — 71046 X-RAY EXAM CHEST 2 VIEWS: CPT

## 2025-06-06 PROCEDURE — 99213 OFFICE O/P EST LOW 20 MIN: CPT

## 2025-06-06 PROCEDURE — 99213 OFFICE O/P EST LOW 20 MIN: CPT | Performed by: EMERGENCY MEDICINE

## 2025-06-06 ASSESSMENT — ENCOUNTER SYMPTOMS
CHEST TIGHTNESS: 1
SINUS PRESSURE: 1
SHORTNESS OF BREATH: 0
SORE THROAT: 0
COUGH: 1
RHINORRHEA: 1

## 2025-06-06 ASSESSMENT — PAIN - FUNCTIONAL ASSESSMENT: PAIN_FUNCTIONAL_ASSESSMENT: NONE - DENIES PAIN

## 2025-06-06 NOTE — DISCHARGE INSTRUCTIONS
Augmentin as prescribed    Drink plenty of water    Try to decrease/stop smoking    Follow-up family physician or return here if no significant improvement in 4 days.  Sooner if worse

## 2025-06-06 NOTE — ED PROVIDER NOTES
[facility] EMERGENCY DEPARTMENT  Urgent Care Encounter       CHIEF COMPLAINT       Chief Complaint   Patient presents with    Cough     \"Hacking up blood clots\" 2 days ago  Productive- Thick, Clear phlegm as of now        Nurses Notes reviewed and I agree except as noted in the HPI.  HISTORY OF PRESENT ILLNESS   South Nettles is a 65 y.o. male who presents for cough and chest congestion.  Symptoms x 3 days.  Patient reports 2 days ago he coughed up a couple clots of blood.  He states he has been coughing today but no hemoptysis.  Patient also reports he has had sinus congestion and pressure for the past 3 weeks.  He has not seen his doctor or been treated for this.  No chest pain.  No shortness of breath.  Patient had a heart catheterization performed 1 month ago with reassuring results.  Patient denies any leg swelling.  No recent weight gain.    HPI    REVIEW OF SYSTEMS     Review of Systems   Constitutional:  Negative for activity change, fatigue and fever.   HENT:  Positive for congestion, rhinorrhea and sinus pressure. Negative for sore throat.    Respiratory:  Positive for cough and chest tightness. Negative for shortness of breath.    Cardiovascular:  Negative for chest pain and leg swelling.       PAST MEDICAL HISTORY         Diagnosis Date    Bilateral inguinal hernia 12/14/2011    Chronic back pain     COPD (chronic obstructive pulmonary disease) (HCC)     Cough     patient is a  has a chronic cough    Essential hypertension 04/01/2016    GERD (gastroesophageal reflux disease)     Osteoarthritis     Recurrent left inguinal hernia     Stomach ulcer        SURGICALHISTORY     Patient  has a past surgical history that includes Rotator cuff repair (2005 x2); hernia repair; hernia repair (01/03/2012); Vasectomy (2005); Tooth Extraction (1997); Colonoscopy (2019); EGD (2019); Wrist surgery (12/27/2018); Arm Surgery (Left, 03/2020); hernia repair (Bilateral, 04/28/2020);  rhinorrhea present.      Mouth/Throat:      Mouth: Mucous membranes are moist.      Pharynx: Oropharynx is clear. No oropharyngeal exudate.   Cardiovascular:      Rate and Rhythm: Normal rate and regular rhythm.      Pulses: Normal pulses.      Heart sounds: Normal heart sounds.   Pulmonary:      Effort: Pulmonary effort is normal. No respiratory distress.      Breath sounds: Normal breath sounds. No wheezing or rhonchi.   Musculoskeletal:      Cervical back: Normal range of motion.   Skin:     General: Skin is warm and dry.      Capillary Refill: Capillary refill takes less than 2 seconds.   Neurological:      General: No focal deficit present.      Mental Status: He is alert.         DIAGNOSTIC RESULTS     Labs:No results found for this visit on 06/06/25.    IMAGING:    XR CHEST (2 VW)   Final Result   1. No acute cardiopulmonary finding..               **This report has been created using voice recognition software.  It may contain   minor errors which are inherent in voice recognition technology.**      Electronically signed by Dr. Karon Del Castillo            EKG:      URGENT CARE COURSE:     Vitals:    06/06/25 1156   BP: (!) 162/88   Pulse: 67   Resp: 16   Temp: 97.3 °F (36.3 °C)   TempSrc: Temporal   SpO2: 95%   Weight: 70.3 kg (155 lb)   Height: 1.651 m (5' 5\")       Medications - No data to display         PROCEDURES:  None    FINAL IMPRESSION      1. Sinobronchitis          DISPOSITION/ PLAN     Patient presents for what is likely acute sinusitis/bronchitis.  X-ray performed showing no mass, effusion, pulmonary edema or pneumonia.  Will place patient on Augmentin for treatment.  Advised to drink plenty of water.  Stop smoking.  Follow-up with primary care provider or return here if no significant improvement in 3 to 4 days.  Sooner if worse.      PATIENT REFERRED TO:  Marsha Ireland MD  [address] [facility] [address] / [facility] [id number]      DISCHARGE MEDICATIONS:  Discharge Medication List as of 6/6/2025  1:02

## 2025-06-12 RX ORDER — ROSUVASTATIN CALCIUM 20 MG/1
20 TABLET, COATED ORAL NIGHTLY
Qty: 90 TABLET | Refills: 0 | Status: SHIPPED | OUTPATIENT
Start: 2025-06-12

## 2025-07-10 ENCOUNTER — OFFICE VISIT (OUTPATIENT)
Dept: FAMILY MEDICINE CLINIC | Age: 65
End: 2025-07-10
Payer: COMMERCIAL

## 2025-07-10 VITALS
OXYGEN SATURATION: 98 % | RESPIRATION RATE: 18 BRPM | WEIGHT: 152 LBS | DIASTOLIC BLOOD PRESSURE: 72 MMHG | BODY MASS INDEX: 25.33 KG/M2 | HEIGHT: 65 IN | SYSTOLIC BLOOD PRESSURE: 116 MMHG | HEART RATE: 52 BPM | TEMPERATURE: 97.7 F

## 2025-07-10 DIAGNOSIS — F17.210 CIGARETTE SMOKER: ICD-10-CM

## 2025-07-10 DIAGNOSIS — J44.1 COPD EXACERBATION (HCC): ICD-10-CM

## 2025-07-10 DIAGNOSIS — J01.20 ACUTE NON-RECURRENT ETHMOIDAL SINUSITIS: Primary | ICD-10-CM

## 2025-07-10 DIAGNOSIS — J30.9 ALLERGIC RHINITIS, UNSPECIFIED SEASONALITY, UNSPECIFIED TRIGGER: ICD-10-CM

## 2025-07-10 PROCEDURE — 1160F RVW MEDS BY RX/DR IN RCRD: CPT | Performed by: NURSE PRACTITIONER

## 2025-07-10 PROCEDURE — 1123F ACP DISCUSS/DSCN MKR DOCD: CPT | Performed by: NURSE PRACTITIONER

## 2025-07-10 PROCEDURE — 3074F SYST BP LT 130 MM HG: CPT | Performed by: NURSE PRACTITIONER

## 2025-07-10 PROCEDURE — 99214 OFFICE O/P EST MOD 30 MIN: CPT | Performed by: NURSE PRACTITIONER

## 2025-07-10 PROCEDURE — 3078F DIAST BP <80 MM HG: CPT | Performed by: NURSE PRACTITIONER

## 2025-07-10 PROCEDURE — 1159F MED LIST DOCD IN RCRD: CPT | Performed by: NURSE PRACTITIONER

## 2025-07-10 RX ORDER — PREDNISONE 50 MG/1
50 TABLET ORAL DAILY
Qty: 5 TABLET | Refills: 0 | Status: SHIPPED | OUTPATIENT
Start: 2025-07-10 | End: 2025-07-15

## 2025-07-10 RX ORDER — FLUTICASONE PROPIONATE 50 MCG
2 SPRAY, SUSPENSION (ML) NASAL DAILY
Qty: 16 G | Refills: 0 | Status: SHIPPED | OUTPATIENT
Start: 2025-07-10

## 2025-07-10 ASSESSMENT — ENCOUNTER SYMPTOMS
CHEST TIGHTNESS: 0
FACIAL SWELLING: 1
SHORTNESS OF BREATH: 0
RHINORRHEA: 1
SORE THROAT: 0
EYE PAIN: 0
ABDOMINAL PAIN: 0
SINUS PRESSURE: 1
COUGH: 1
SINUS PAIN: 0
NAUSEA: 0
VOMITING: 0
WHEEZING: 1

## 2025-07-10 NOTE — PROGRESS NOTES
symptoms.  - Encouraged to quit smoking to help manage COPD symptoms.       South was seen today for sinusitis.    Diagnoses and all orders for this visit:    Acute non-recurrent ethmoidal sinusitis  -     amoxicillin-clavulanate (AUGMENTIN) 875-125 MG per tablet; Take 1 tablet by mouth 2 times daily for 7 days  -     predniSONE (DELTASONE) 50 MG tablet; Take 1 tablet by mouth daily for 5 days    COPD exacerbation (HCC)  -     amoxicillin-clavulanate (AUGMENTIN) 875-125 MG per tablet; Take 1 tablet by mouth 2 times daily for 7 days  -     predniSONE (DELTASONE) 50 MG tablet; Take 1 tablet by mouth daily for 5 days    Cigarette smoker    Allergic rhinitis, unspecified seasonality, unspecified trigger  -     fluticasone (FLONASE) 50 MCG/ACT nasal spray; 2 sprays by Each Nostril route daily        Patient given educational materials - see patient instructions.  Discussed use, benefit, and side effects of prescribed medications.  All patient questions answered.  Pt voiced understanding.  Reviewed health maintenance.       (Please note that portions of this note may have been completed with a voice recognition program.  Efforts were made to edit the dictation but occasionally words are mis-transcribed.)    The patient (or guardian, if applicable) and other individuals in attendance with the patient were advised that Artificial Intelligence will be utilized during this visit to record, process the conversation to generate a clinical note, and support improvement of the AI technology. The patient (or guardian, if applicable) and other individuals in attendance at the appointment consented to the use of AI, including the recording.                    Return if symptoms worsen or fail to improve.  Electronically signed by FREDIS Starr CNP on 7/10/2025 at 12:50 PM EDT

## 2025-07-16 RX ORDER — ASPIRIN 81 MG/1
81 TABLET, COATED ORAL DAILY
Qty: 90 TABLET | Refills: 0 | Status: SHIPPED | OUTPATIENT
Start: 2025-07-16

## 2025-08-06 DIAGNOSIS — J30.9 ALLERGIC RHINITIS, UNSPECIFIED SEASONALITY, UNSPECIFIED TRIGGER: ICD-10-CM

## 2025-08-06 RX ORDER — FLUTICASONE PROPIONATE 50 MCG
2 SPRAY, SUSPENSION (ML) NASAL DAILY
Qty: 16 G | Refills: 0 | Status: SHIPPED | OUTPATIENT
Start: 2025-08-06

## 2025-08-07 DIAGNOSIS — I25.10 CORONARY ARTERY DISEASE INVOLVING NATIVE CORONARY ARTERY OF NATIVE HEART, UNSPECIFIED WHETHER ANGINA PRESENT: ICD-10-CM

## 2025-08-07 DIAGNOSIS — Z95.820 STATUS POST ANGIOPLASTY WITH STENT: Primary | ICD-10-CM

## 2025-08-07 RX ORDER — ASPIRIN 81 MG/1
81 TABLET ORAL DAILY
Qty: 90 TABLET | Refills: 3 | Status: SHIPPED | OUTPATIENT
Start: 2025-08-07

## 2025-08-07 RX ORDER — NITROGLYCERIN 0.4 MG/1
0.4 TABLET SUBLINGUAL EVERY 5 MIN PRN
Qty: 25 TABLET | Refills: 1 | Status: SHIPPED | OUTPATIENT
Start: 2025-08-07

## 2025-08-16 DIAGNOSIS — I25.10 CORONARY ARTERY DISEASE INVOLVING NATIVE CORONARY ARTERY OF NATIVE HEART, UNSPECIFIED WHETHER ANGINA PRESENT: ICD-10-CM

## 2025-08-16 DIAGNOSIS — Z95.820 STATUS POST ANGIOPLASTY WITH STENT: ICD-10-CM

## 2025-08-18 RX ORDER — NITROGLYCERIN 0.4 MG/1
TABLET SUBLINGUAL
Qty: 25 TABLET | Refills: 1 | OUTPATIENT
Start: 2025-08-18

## 2025-08-25 ENCOUNTER — OFFICE VISIT (OUTPATIENT)
Dept: FAMILY MEDICINE CLINIC | Age: 65
End: 2025-08-25
Payer: COMMERCIAL

## 2025-08-25 VITALS
WEIGHT: 153 LBS | HEIGHT: 65 IN | SYSTOLIC BLOOD PRESSURE: 132 MMHG | RESPIRATION RATE: 18 BRPM | OXYGEN SATURATION: 96 % | BODY MASS INDEX: 25.49 KG/M2 | TEMPERATURE: 97.8 F | HEART RATE: 55 BPM | DIASTOLIC BLOOD PRESSURE: 78 MMHG

## 2025-08-25 DIAGNOSIS — J32.9 RECURRENT SINUSITIS: Primary | ICD-10-CM

## 2025-08-25 DIAGNOSIS — N32.81 OVERACTIVE BLADDER: ICD-10-CM

## 2025-08-25 DIAGNOSIS — R35.0 URINARY FREQUENCY: ICD-10-CM

## 2025-08-25 DIAGNOSIS — R43.2 LOSS OF TASTE: ICD-10-CM

## 2025-08-25 PROCEDURE — 3078F DIAST BP <80 MM HG: CPT | Performed by: NURSE PRACTITIONER

## 2025-08-25 PROCEDURE — 99214 OFFICE O/P EST MOD 30 MIN: CPT | Performed by: NURSE PRACTITIONER

## 2025-08-25 PROCEDURE — 1160F RVW MEDS BY RX/DR IN RCRD: CPT | Performed by: NURSE PRACTITIONER

## 2025-08-25 PROCEDURE — 1159F MED LIST DOCD IN RCRD: CPT | Performed by: NURSE PRACTITIONER

## 2025-08-25 PROCEDURE — 1123F ACP DISCUSS/DSCN MKR DOCD: CPT | Performed by: NURSE PRACTITIONER

## 2025-08-25 PROCEDURE — 3075F SYST BP GE 130 - 139MM HG: CPT | Performed by: NURSE PRACTITIONER

## 2025-08-25 RX ORDER — OXYBUTYNIN CHLORIDE 5 MG/1
5 TABLET, EXTENDED RELEASE ORAL DAILY
Qty: 30 TABLET | Refills: 3 | Status: SHIPPED | OUTPATIENT
Start: 2025-08-25

## 2025-08-25 RX ORDER — DOXYCYCLINE HYCLATE 100 MG
100 TABLET ORAL 2 TIMES DAILY
Qty: 28 TABLET | Refills: 0 | Status: SHIPPED | OUTPATIENT
Start: 2025-08-25 | End: 2025-09-08

## 2025-08-25 RX ORDER — CLOPIDOGREL BISULFATE 75 MG/1
75 TABLET ORAL DAILY
Qty: 90 TABLET | Refills: 0 | Status: SHIPPED | OUTPATIENT
Start: 2025-08-25

## 2025-08-25 RX ORDER — PREDNISONE 50 MG/1
50 TABLET ORAL DAILY
Qty: 5 TABLET | Refills: 0 | Status: SHIPPED | OUTPATIENT
Start: 2025-08-25 | End: 2025-08-30

## 2025-08-26 ENCOUNTER — OFFICE VISIT (OUTPATIENT)
Dept: CARDIOLOGY CLINIC | Age: 65
End: 2025-08-26
Payer: COMMERCIAL

## 2025-08-26 VITALS
SYSTOLIC BLOOD PRESSURE: 140 MMHG | BODY MASS INDEX: 24.59 KG/M2 | WEIGHT: 153 LBS | HEIGHT: 66 IN | OXYGEN SATURATION: 99 % | HEART RATE: 59 BPM | DIASTOLIC BLOOD PRESSURE: 76 MMHG

## 2025-08-26 DIAGNOSIS — I25.10 CAD IN NATIVE ARTERY: Primary | ICD-10-CM

## 2025-08-26 PROCEDURE — 1159F MED LIST DOCD IN RCRD: CPT | Performed by: INTERNAL MEDICINE

## 2025-08-26 PROCEDURE — 3078F DIAST BP <80 MM HG: CPT | Performed by: INTERNAL MEDICINE

## 2025-08-26 PROCEDURE — 3077F SYST BP >= 140 MM HG: CPT | Performed by: INTERNAL MEDICINE

## 2025-08-26 PROCEDURE — 99214 OFFICE O/P EST MOD 30 MIN: CPT | Performed by: INTERNAL MEDICINE

## 2025-08-26 PROCEDURE — 1123F ACP DISCUSS/DSCN MKR DOCD: CPT | Performed by: INTERNAL MEDICINE

## 2025-08-28 ENCOUNTER — HOSPITAL ENCOUNTER (OUTPATIENT)
Dept: GENERAL RADIOLOGY | Age: 65
Discharge: HOME | End: 2025-08-28
Payer: COMMERCIAL

## 2025-08-28 ENCOUNTER — RESULTS FOLLOW-UP (OUTPATIENT)
Dept: FAMILY MEDICINE CLINIC | Age: 65
End: 2025-08-28

## 2025-08-28 DIAGNOSIS — N32.81 OVERACTIVE BLADDER: ICD-10-CM

## 2025-08-28 DIAGNOSIS — R35.0 URINARY FREQUENCY: ICD-10-CM

## 2025-08-28 LAB
ANION GAP SERPL CALC-SCNC: 12 MEQ/L (ref 8–16)
BILIRUB UR QL STRIP.AUTO: NEGATIVE
BUN SERPL-MCNC: 29 MG/DL (ref 8–23)
CALCIUM SERPL-MCNC: 9.6 MG/DL (ref 8.5–10.5)
CHARACTER UR: CLEAR
CHLORIDE SERPL-SCNC: 97 MEQ/L (ref 98–111)
CO2 SERPL-SCNC: 24 MEQ/L (ref 22–29)
COLOR, UA: YELLOW
CREAT SERPL-MCNC: 0.9 MG/DL (ref 0.7–1.2)
GFR SERPL CREATININE-BSD FRML MDRD: > 90 ML/MIN/1.73M2
GLUCOSE SERPL-MCNC: 99 MG/DL (ref 74–109)
GLUCOSE UR QL STRIP.AUTO: NEGATIVE MG/DL
HGB UR QL STRIP.AUTO: NEGATIVE
KETONES UR QL STRIP.AUTO: NEGATIVE
NITRITE UR QL STRIP: NEGATIVE
PH UR STRIP.AUTO: 6.5 [PH] (ref 5–9)
POTASSIUM SERPL-SCNC: 4.1 MEQ/L (ref 3.5–5.2)
PROT UR STRIP.AUTO-MCNC: NEGATIVE MG/DL
PSA SERPL-MCNC: 0.59 NG/ML (ref 0–1)
SODIUM SERPL-SCNC: 133 MEQ/L (ref 135–145)
SP GR UR REFRACT.AUTO: 1.02 (ref 1–1.03)
UROBILINOGEN, URINE: 0.2 EU/DL (ref 0–1)
WBC #/AREA URNS HPF: NEGATIVE /[HPF]

## 2025-08-28 PROCEDURE — 84153 ASSAY OF PSA TOTAL: CPT

## 2025-08-28 PROCEDURE — 36415 COLL VENOUS BLD VENIPUNCTURE: CPT

## 2025-08-28 PROCEDURE — 81003 URINALYSIS AUTO W/O SCOPE: CPT

## 2025-08-28 PROCEDURE — 80048 BASIC METABOLIC PNL TOTAL CA: CPT

## 2025-09-04 DIAGNOSIS — J30.9 ALLERGIC RHINITIS, UNSPECIFIED SEASONALITY, UNSPECIFIED TRIGGER: ICD-10-CM

## 2025-09-04 RX ORDER — FLUTICASONE PROPIONATE 50 MCG
2 SPRAY, SUSPENSION (ML) NASAL DAILY
Qty: 16 G | Refills: 5 | Status: SHIPPED | OUTPATIENT
Start: 2025-09-04

## (undated) DEVICE — SEAL

## (undated) DEVICE — BLANKET THER PED W24XL30IN FAB COVERING FOR HTP 1500 HEAT

## (undated) DEVICE — DRAPE KIT RAMAPR RADIATION SHIELD

## (undated) DEVICE — SOLUTION ANTIFOG VIS SYS CLEARIFY LAPSCP

## (undated) DEVICE — GOWN,SIRUS,NON REINFRCD,LARGE,SET IN SL: Brand: MEDLINE

## (undated) DEVICE — KIT ANGIO W/ AT P65 PREM HND CTRL FOR CNTRST DEL ANGIOTOUCH

## (undated) DEVICE — CATH BLLN ANGIO 3.50X12MM NC EUPHORIA RX

## (undated) DEVICE — 4-PORT MANIFOLD: Brand: NEPTUNE 2

## (undated) DEVICE — REDUCER: Brand: ENDOWRIST

## (undated) DEVICE — SYRINGE MED 30ML STD CLR PLAS LUERLOCK TIP N CTRL DISP

## (undated) DEVICE — GUIDEWIRE VASC L260CM DIA0.035IN RAD 3MM J TIP L7CM PTFE

## (undated) DEVICE — GLIDESHEATH SLENDER ACCESS KIT: Brand: GLIDESHEATH SLENDER

## (undated) DEVICE — ELECTRO LUBE IS A SINGLE PATIENT USE DEVICE THAT IS INTENDED TO BE USED ON ELECTROSURGICAL ELECTRODES TO REDUCE STICKING.: Brand: KEY SURGICAL ELECTRO LUBE

## (undated) DEVICE — SUTURE VCRL SZ 4-0 L27IN ABSRB UD L19MM FS-2 3/8 CIR REV J422H

## (undated) DEVICE — Device

## (undated) DEVICE — TIBURON NEONATAL DRAPE: Brand: CONVERTORS

## (undated) DEVICE — CATHETER DIAG 6FR L100CM LUMN ID0.056IN JR4 CRV 0 SIDE H

## (undated) DEVICE — KIT MFLD ISOLATN NACL CNTRST PRT TBNG SPIK W/ PRSS TRNSDUC

## (undated) DEVICE — GLOVE ORTHO 7 1/2   MSG9475

## (undated) DEVICE — BLADELESS OBTURATOR: Brand: WECK VISTA

## (undated) DEVICE — BINDER ABD UNISX 4 PNL PREM 6INX6INX12IN L XL 4

## (undated) DEVICE — COVER ARMBRD W13XL28.5IN IMPERV BLU FOR OP RM

## (undated) DEVICE — BAND COMPR L24CM REG CLR PLAS HEMSTAT EXT HK AND LOOP RETEN

## (undated) DEVICE — DEVICE INFL 20ML 30ATM POLYCARB BRL ABS PLUNG DGT DISPLAY

## (undated) DEVICE — GENERAL LAPAROSCOPY PACK-LF: Brand: MEDLINE INDUSTRIES, INC.

## (undated) DEVICE — CATHETER GUID 6FR L100CM DIA0.071IN NYL SHFT JL3.5 W/O SIDE

## (undated) DEVICE — INTENDED FOR TISSUE SEPARATION, AND OTHER PROCEDURES THAT REQUIRE A SHARP SURGICAL BLADE TO PUNCTURE OR CUT.: Brand: BARD-PARKER ® CARBON RIB-BACK BLADES

## (undated) DEVICE — PROVE COVER: Brand: UNBRANDED

## (undated) DEVICE — BLADE CLIPPER GEN PURP NS

## (undated) DEVICE — RUNTHROUGH NS EXTRA FLOPPY PTCA GUIDEWIRE: Brand: RUNTHROUGH

## (undated) DEVICE — CANNULA SEAL

## (undated) DEVICE — TUBING INSUFFLATOR HEAT HUMIDIFIED SMK EVAC SET PNEUMOCLEAR

## (undated) DEVICE — COLUMN DRAPE

## (undated) DEVICE — GLOVE ORANGE PI 7   MSG9070

## (undated) DEVICE — CATHETER COR DIAG JUDKINS L 3.5 CRV 6FR 100CM 0 SIDE H

## (undated) DEVICE — SUTURE V-LOC 180 SZ 3-0 L9IN ABSRB GRN L26MM V-20 1/2 CIR VLOCL0644

## (undated) DEVICE — TUBING RIGID ANGIO L10IN 1200PSI CNTRST INJ FIX FEM LUER CONN HI

## (undated) DEVICE — BASIC SINGLE BASIN BTC-LF: Brand: MEDLINE INDUSTRIES, INC.

## (undated) DEVICE — SUTURE PDS II SZ 0 L27IN ABSRB VLT L36MM CT-1 1/2 CIR Z340H

## (undated) DEVICE — BANDAGE ADH W1XL3IN NAT FAB WVN FLX DURABLE N ADH PD SEAL

## (undated) DEVICE — DRESSING TRNSPAR W5XL4.5IN FLM SHT SEMIPERMEABLE WIND

## (undated) DEVICE — ARM DRAPE

## (undated) DEVICE — SUTURE VCRL SZ 3-0 L18IN ABSRB VLT L26MM SH 1/2 CIR J774D

## (undated) DEVICE — GLOVE ORANGE PI 8   MSG9080

## (undated) DEVICE — TIP COVER ACCESSORY

## (undated) DEVICE — 260 CM J TIP WIRE .035

## (undated) DEVICE — SOLUTION IV 1000ML 0.9% SOD CHL PH 5 INJ USP VIAFLX PLAS

## (undated) DEVICE — INSUFFLATION NEEDLE TO ESTABLISH PNEUMOPERITONEUM.: Brand: INSUFFLATION NEEDLE

## (undated) DEVICE — BREAST HERNIA PACK: Brand: MEDLINE INDUSTRIES, INC.

## (undated) DEVICE — SUTURE VCRL + SZ 4-0 L27IN ABSRB WHT FS-2 3/8 CIR REV CUT VCP422H

## (undated) DEVICE — SOLUTION IV IRRIG WATER 1000ML POUR BRL 2F7114

## (undated) DEVICE — BINDER ABD SM M W12IN CIRC 30 45IN 4 PNL E CNTCT CLSR POSTOP

## (undated) DEVICE — VALVE HEMSTAS W/ GWIRE INSRTN TOOL GRDIAN II NC